# Patient Record
Sex: FEMALE | NOT HISPANIC OR LATINO | Employment: PART TIME | URBAN - METROPOLITAN AREA
[De-identification: names, ages, dates, MRNs, and addresses within clinical notes are randomized per-mention and may not be internally consistent; named-entity substitution may affect disease eponyms.]

---

## 2017-03-06 ENCOUNTER — OFFICE VISIT (OUTPATIENT)
Dept: INTERNAL MEDICINE | Facility: CLINIC | Age: 42
End: 2017-03-06

## 2017-03-06 VITALS
SYSTOLIC BLOOD PRESSURE: 103 MMHG | HEART RATE: 64 BPM | HEIGHT: 67 IN | WEIGHT: 128.6 LBS | DIASTOLIC BLOOD PRESSURE: 71 MMHG | BODY MASS INDEX: 20.18 KG/M2

## 2017-03-06 DIAGNOSIS — K62.5 RECTAL HEMORRHAGE: Primary | ICD-10-CM

## 2017-03-06 DIAGNOSIS — Z12.4 SCREENING FOR CERVICAL CANCER: ICD-10-CM

## 2017-03-06 ASSESSMENT — PAIN SCALES - GENERAL: PAINLEVEL: NO PAIN (0)

## 2017-03-06 NOTE — PATIENT INSTRUCTIONS
Primary Care Center Medication Refill Request Information:  * Please contact your pharmacy regarding ANY request for medication refills.  ** [facility] Prescription Fax = [phone]  * Please allow 3 business days for routine medication refills.  * Please allow 5 business days for controlled substance medication refills.     Primary Care Center Test Result notification information:  *You will be notified with in 7-10 days of your appointment day regarding the results of your test.  If you are on MyChart you will be notified as soon as the provider has reviewed the results and signed off on them.    Primary Care Center   [facility]  [address] (4th Floor )   [place], [place] [address] [phone]  [phone]  [facility] ([facility])  [address] [place] [address], [address]  [place] [address] [phone].

## 2017-03-06 NOTE — MR AVS SNAPSHOT
After Visit Summary   3/6/2017    Joyce Murry    MRN: [id number]           Patient Information     Date Of Birth          1975        Visit Information        Provider Department      3/6/2017 12:40 PM Cherie Rose MD [facility]        Today's Diagnoses     Rectal hemorrhage    -  1    Screening for cervical cancer          Care Instructions    Primary Care Center Medication Refill Request Information:  * Please contact your pharmacy regarding ANY request for medication refills.  ** [facility] Prescription Fax = [phone]  * Please allow 3 business days for routine medication refills.  * Please allow 5 business days for controlled substance medication refills.     Primary Care Center Test Result notification information:  *You will be notified with in 7-10 days of your appointment day regarding the results of your test.  If you are on MyChart you will be notified as soon as the provider has reviewed the results and signed off on them.    Primary Care Center   [facility]  [address] ([address]th Floor )   [place] [address] [phone]  [phone]  [facility] ([facility])  [address] [place] [address], [address]  [place] [address]  [phone].            Follow-ups after your visit        Additional Services     GI Procedure Referral       Last Lab Result: Creatinine (mg/dL)       Date                     Value                 10/08/2012               0.65             ----------  There is no height or weight on file to calculate BMI.     Needed:  No  Language:  English    Patient will be contacted to schedule procedure.     Please be aware that coverage of these services is subject to the terms and limitations of your health insurance plan.  Call member services at your health plan with any benefit or coverage questions.  Any procedures must be performed at a [facility] facility OR coordinated by your clinic's referral office.    Please bring the  following with you to your appointment:    (1) Any X-Rays, CTs or MRIs which have been performed.  Contact the facility where they were done to arrange for  prior to your scheduled appointment.    (2) List of current medications   (3) This referral request   (4) Any documents/labs given to you for this referral                  Your next 10 appointments already scheduled     Apr 19, 2017  3:00 PM CDT   New Visit with [facility] [place] ([facility] [place])    [address] [place] [address] [address]  [place] [id number] [phone]            Apr 26, 2017  1:00 PM CDT   Return Visit with Yareli Alicia [facility] [place] ([facility] [place])    [address] [place] [address] [address]  [place] [id number] [phone]              Who to contact     Please call your clinic at [phone] to:    Ask questions about your health    Make or cancel appointments    Discuss your medicines    Learn about your test results    Speak to your doctor   If you have compliments or concerns about an experience at your clinic, or if you wish to file a complaint, please contact [facility] Patient Relations at [phone] or email us at [email or web address]         Additional Information About Your Visit        [employer] Information     [facility] gives you secure access to your electronic health record. If you see a primary care provider, you can also send messages to your care team and make appointments. If you have questions, please call your primary care clinic.  If you do not have a primary care provider, please call [phone] and they will assist you.      [facility] is an electronic gateway that provides easy, online access to your medical records. With [facility], you can request a clinic appointment, read your test results, renew a prescription or communicate with your care team.     To access your existing account, please contact your [facility]  Clinic or call [phone] for assistance.        Care EveryWhere ID     This is your Care EveryWhere ID. This could be used by other organizations to access your [place] medical records  [id number]        Your Vitals Were     Pulse Last Period                64 03/02/2016 (Approximate)           Blood Pressure from Last 3 Encounters:   03/06/17 103/71   10/08/12 102/68   10/08/12 91/68    Weight from Last 3 Encounters:   10/08/12 60.8 kg (134 lb)   10/08/12 60.7 kg (133 lb 14.4 oz)              We Performed the Following     GI Procedure Referral     Pap imaged thin layer screen with HPV - recommended age 30 - 65 years (select HPV order below)        Primary Care Provider    Md Other Clinic                Thank you!     Thank you for choosing [facility]  for your care. Our goal is always to provide you with excellent care. Hearing back from our patients is one way we can continue to improve our services. Please take a few minutes to complete the written survey that you may receive in the mail after your visit with us. Thank you!             Your Updated Medication List - Protect others around you: Learn how to safely use, store and throw away your medicines at www.disposemymeds.org.          This list is accurate as of: 3/6/17  1:37 PM.  Always use your most recent med list.                   Brand Name Dispense Instructions for use    NO ACTIVE MEDICATIONS

## 2017-03-06 NOTE — PROGRESS NOTES
Chief complaint: Establish care  History of present illness: This healthy 41-year-old woman presents to [facility].   She has previously had her primary care at health [facility]. She has several questions today:    1.   She's had an intermittent right lower abdominal/inguinal bulge over the last several months. It is never painful but a pop at times. She is very physically active and does weight lifting and boxing. She's never had nausea or vomiting.  She wonders if it's aninguinali hernia.    2.  a gladys on her left breast. Been present for a few months. It's not painful or draining and she's been taking and that she just finds it to be an eyesore.    3.  Itching for weeks in theright inguinal area.   she hasn't seen a rash or redness. She wonders if it might be an irritation from her very tight workout clothes.     4.  Intermittent tingling and numbness in her right fourth and fifth fingers. Sometimes she has right elbow pain. Sometimes she wakes up in the middle the night both her hands asleep but that goes away.  Right 4-5th dave tingling and numbness.  Wakes at night with her hands asleep.      5.   Intermittent rectal bleeding.  This has been going on intermittently for a year or so.  The blood is bright red and on the toilet paper and appears to be on the outside. There've been no blood clots or mixture of stool with blood. She does not have melena. She also does not have abdominal pain, nausea or vomiting with this rectal bleeding. There is no family history of colon cancer. I did not ask there is a family history of ulcerative colitis or Crohn's disease.    Past Medical History   Diagnosis Date     Dyslipidemia, goal LDL below 160      Genital herpes        Past Surgical History   Procedure Laterality Date     D & c  2010     Family History   Problem Relation Age of Onset     Hypertension Father      pulmonary HTN     HEART DISEASE Father 55     CAD-      DIABETES Father 65     type 2     Breast  Cancer Mother 63     diganosed age 50     Lung Cancer Maternal Grandfather           Breast Cancer Paternal Aunt      Social History     Social History     Marital status:      Spouse name: N/A     Number of children: N/A     Years of education: N/A     Occupational History     Not on file.     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 No masses, lesions, tenderness or abnormalities  Neck: Neck supple. No adenopathy. Thyroid symmetric, normal size,, Carotids without bruits.  ENT: ENT exam normal, no neck nodes or sinus tenderness  Cardiovascular: negative, PMI normal. No lifts, heaves, or thrills. RRR. No murmurs, clicks gallops or rub  Respiratory: negative, Percussion normal. Good diaphragmatic excursion. Lungs clear  Gastrointestinal: Bowel sounds are normal. Soft nontender without hepatosplenomegaly. In the right inguinal region there is a bulge brought on by coughing that is reducible. It does not fully admit my finger.  : BUS vagina and cervix are normal. Pap smear taken. Bimanual examination reveals normal adnexa and no tenderness. Rectovaginal examination reveals soft internal tissue, possibly internal hemorrhoid but not polyp, and no rectal masses. The external anal  sphincter shows no anal fissures or external hemorrhoids. There is no ulceration  Musculoskeletal: extremities normal- no gross deformities noted, gait normal and normal muscle tone  Skin: There is a milia on the left breast medially.   there is no rash, ulceration or discoloration of the skin in the right groin     Neurologic: There is slight tenderness in the right medial elbow, sensation is intact in the fingers on the right hand.  Hand strength is intact.   (Pulses are strong and bilaterally symmetric and the brachial and radial pulses bilaterally) .    Psychiatric: mentation appears normal and affect normal/bright  Hematologic/Lymphatic/Immunologic: Normal cervical lymph nodes      ASSESSMENT  1.  Intermittent rectal bleeding. Physical exam is normal except for possibly showing  An internal hemorrhoidal tag.  There is no mass lesion. We talked about the pros and cons of doing colonoscopy. Recent to do it would be to look for inflammatory bowel disease in the remote possibility of colon polyps or colon cancer. Recent not to do it is across no family history, and the probability of colon cancer and 41-year-old is very low.  She will think about this and get back to me. I have given her referral for colonoscopy which would be my preference.    2.   Right ulnar neuropathy.  Intermittent sensory changes and no motor changes. Recommended simply monitoring this. If the symptoms progress, she will let me know and we will obtain an orthoped referral.    3.  Milia on left breast. Recommended hot packing and trying to express the sebum.    4.   Right inguinal hernia. This is very mild. It is possible that her 50 pound weight lifting and body building will aggravate this. Discussed. We also discussed indications for surgery which would include persistence of the hernia, increasing pain, or nausea and vomiting. At this point, she does not wish to have a surgical referral but would like to see how things go. I thought that  was fine.    5.  Healthcare maintenance: Pap smear done and sent for HPV.  Review of the outside records shows that she has had improved lipids therefore will not recheck those currently. There is no indication for additional testing at this point.    Joyce was seen today for establish care.    Diagnoses and all orders for this visit:    Rectal hemorrhage  -     GI Procedure Referral    Screening for cervical cancer  -     Cancel: Pap imaged thin layer screen with HPV - recommended age 30 - 65 years (select HPV order below)  -     Pap imaged thin layer screen with HPV - recommended age 30 - 65 years (select HPV order below)  -     HPV High Risk Types DNA Cervical

## 2017-03-08 LAB
COPATH REPORT: NORMAL
PAP: NORMAL

## 2017-03-10 LAB
FINAL DIAGNOSIS: NORMAL
HPV HR 12 DNA CVX QL NAA+PROBE: NEGATIVE
HPV16 DNA SPEC QL NAA+PROBE: NEGATIVE
HPV18 DNA SPEC QL NAA+PROBE: NEGATIVE
SPECIMEN DESCRIPTION: NORMAL

## 2017-04-13 ENCOUNTER — OFFICE VISIT (OUTPATIENT)
Dept: PSYCHOLOGY | Facility: CLINIC | Age: 42
End: 2017-04-13
Payer: COMMERCIAL

## 2017-04-13 DIAGNOSIS — F41.1 GAD (GENERALIZED ANXIETY DISORDER): Primary | ICD-10-CM

## 2017-04-13 PROCEDURE — 90791 PSYCH DIAGNOSTIC EVALUATION: CPT | Performed by: MARRIAGE & FAMILY THERAPIST

## 2017-04-13 ASSESSMENT — PATIENT HEALTH QUESTIONNAIRE - PHQ9: 5. POOR APPETITE OR OVEREATING: SEVERAL DAYS

## 2017-04-13 ASSESSMENT — ANXIETY QUESTIONNAIRES
7. FEELING AFRAID AS IF SOMETHING AWFUL MIGHT HAPPEN: SEVERAL DAYS
6. BECOMING EASILY ANNOYED OR IRRITABLE: MORE THAN HALF THE DAYS
IF YOU CHECKED OFF ANY PROBLEMS ON THIS QUESTIONNAIRE, HOW DIFFICULT HAVE THESE PROBLEMS MADE IT FOR YOU TO DO YOUR WORK, TAKE CARE OF THINGS AT HOME, OR GET ALONG WITH OTHER PEOPLE: SOMEWHAT DIFFICULT
1. FEELING NERVOUS, ANXIOUS, OR ON EDGE: MORE THAN HALF THE DAYS
3. WORRYING TOO MUCH ABOUT DIFFERENT THINGS: MORE THAN HALF THE DAYS
2. NOT BEING ABLE TO STOP OR CONTROL WORRYING: MORE THAN HALF THE DAYS
GAD7 TOTAL SCORE: 10
5. BEING SO RESTLESS THAT IT IS HARD TO SIT STILL: NOT AT ALL

## 2017-04-13 NOTE — MR AVS SNAPSHOT
MRN:[id number]                      After Visit Summary   4/13/2017    Joyce Murry    MRN: [id number]           Visit Information        Provider Department      4/13/2017 11:30 AM Denise Yareli [facility]      Your next 10 appointments already scheduled     Apr 19, 2017  3:00 PM CDT   Return Visit with Yarelijuan Alicia [facility] ([facility])    [address]  [facility] [address]   [phone]            Apr 26, 2017  1:00 PM CDT   Return Visit with Yareli Alicia [facility] ([facility] [place])    [address]  [facility] [address]   [phone]              MyChart Information     [employer] gives you secure access to your electronic health record. If you see a primary care provider, you can also send messages to your care team and make appointments. If you have questions, please call your primary care clinic.  If you do not have a primary care provider, please call [phone] and they will assist you.        Care EveryWhere ID     This is your Care EveryWhere ID. This could be used by other organizations to access your [place] medical records  [id number]

## 2017-04-13 NOTE — PROGRESS NOTES
Adult Intake Structured Interview  Standard Diagnostic Assessment      CLIENT'S NAME: Joyce Murry  MRN:   [id number]  :   1975  ACCT. NUMBER: [id number]  DATE OF SERVICE: 17      Identifying Information:  Client is a 41 year old, ,  female. Client was referred for counseling by self. Client is currently unemployed. Client attended the session alone. Client will send her  for the next visit and then we will do family therapy.      Client's Statement of Presenting Concern:  Client reports the reason for seeking therapy at this time as needing help with anxiety related to current life circumstances including marriage, career/work, parenting and life transition. Client wants to be able to partner more with her  and be able to be vulnerable with each other which currently doesn't happen. Also trying to figure out her role now as mother, wife, individual.   Client stated that her symptoms have resulted in the following functional impairments: relationship(s)      History of Presenting Concern:  Client reports that these problem(s) began in the past year. Client has attempted to resolve these concerns in the past through individual counseling. Client reports that other professional(s) are involved in providing support / services. Client has an individual therapist and she will see me for family work,      Social History:  Client reported she grew up in [place].. They were the third born of 3 children. This is an intact family and parents remain . However her father  in 2016. Client reported that her childhood was happy. She grew up in an upper middle class family that was culturally diverse. Her father is from [place]. Client described her current relationships with family of origin as good  with STEVEN but strained with her . They bicker a lot and she has anxiety about his anger issues but also mad at him for having them.    Client reported a history of 1 committed relationships or marriages. Client has been  for 14 years. Client reported having 2 children. Client identified few stable and meaningful social connections. Client reported that she has not been involved with the legal system.  Client's highest education level was college graduate. Client did not identify any learning problems. There are no ethnic, cultural or Religion factors that may be relevant for therapy. Client identified her preferred language to be English. Client reported she does not need the assistance of an  or other support involved in therapy. Modifications will not be used to assist communication in therapy. Client did not serve in the .     Client reports family history includes Breast Cancer in her paternal aunt; Breast Cancer (age of onset: 63) in her mother; DIABETES (age of onset: 65) in her father; HEART DISEASE (age of onset: 55) in her father; Hypertension in her father; Lung Cancer in her maternal grandfather.    Mental Health History:  Client reported no family history of mental health issues.  Client previously received the following mental health diagnosis: Anxiety.  Client has not received mental health services in the past.  Hospitalizations: None.  Client is currently receiving the following services: counseling.  Client does not take medication for anxiety.    Chemical Health History:  Client reported no family history of chemical health issues. Client has not received chemical dependency treatment in the past. Client is not currently receiving any chemical dependency treatment. Client reports no problems as a result of their drinking / drug use.      Client Reports:  Client denies using alcohol.  Client denies using tobacco.  Client denies using marijuana.  Client reports using  caffeine 2 times per day and drinks 1 at a time. Client started using caffeine at age 8.  Client denies using street drugs.  Client denies the non-medical use of prescription or over the counter drugs.    CAGE: None of the patient's responses to the CAGE screening were positive / Negative CAGE score   Based on the negative Cage-Aid score and clinical interview there  are not indications of drug or alcohol abuse.    Discussed the general effects of drugs and alcohol on health and well-being. Therapist gave client printed information about the effects of chemical use on her health and well being.      Significant Losses / Trauma / Abuse / Neglect Issues:  There are indications or report of significant loss, trauma, abuse or neglect issues related to: death of her father in 9/2016.    Issues of possible neglect are not present.      Medical Issues:  Client has had a physical exam to rule out medical causes for current symptoms. Date of last physical exam was within the past year. Client was encouraged to follow up with PCP if symptoms were to develop. The client has a [place] Primary Care Provider at [facility] named Cherie Rose. The client reports not having a psychiatrist. Client reports no current medical concerns. The client denies the presence of chronic or episodic pain. There are not significant nutritional concerns.     Client reports not having any current medications.    Client Allergies:  Allergies   Allergen Reactions     Augmentin Rash     the following allergies to medications: augmentin    Medical History:  Past Medical History:   Diagnosis Date     Genital herpes      History of hyperlipidemia, improved as of 2016 with diet and exercise          Medication Adherence:  N/A - Client does not have prescribed psychiatric medications.    Client was provided recommendation to follow-up with prescribing physician.    Mental Status Assessment:  Appearance:   Appropriate   Eye Contact:   Good   Psychomotor  Behavior: Normal   Attitude:   Cooperative   Orientation:   All  Speech   Rate / Production: Normal    Volume:  Normal   Mood:    Normal  Affect:    Appropriate   Thought Content:  Clear   Thought Form:  Coherent  Logical   Insight:    Good       Review of Symptoms:  Depression: Sleep Appetite Hopeless Worthless Ruminations Irritability  Ama:  No symptoms  Psychosis: No symptoms  Anxiety: Worries Nervousness Triggers: safety of her children   Panic:  No symptoms  Post Traumatic Stress Disorder: No symptoms  Obsessive Compulsive Disorder: No symptoms  Eating Disorder: No symptoms  Oppositional Defiant Disorder: No symptoms  ADD / ADHD: No symptoms  Conduct Disorder: No symptoms        Safety Issues and Plan for Safety and Risk Management:  Client denies a history of suicidal ideation, suicide attempts, self-injurious behavior, homicidal ideation, homicidal behavior and and other safety concerns  Client denies current fears or concerns for personal safety.  Client denies current or recent suicidal ideation or behaviors.  Client denies current or recent homicidal ideation or behaviors.  Client denies current or recent self injurious behavior or ideation.  Client denies other safety concerns.  Client reports there are no firearms in the house.  A safety and risk management plan has not been developed at this time, however client was given the after-hours number / 911 should there be a change in any of these risk factors.    Client's Strengths and Limitations:  Client identified the following strengths or resources that will help her succeed in counseling: commitment to health and well being, friends / good social support, family support and intelligence. Client identified the following supports: family and friends. Things that may interfere with the clients success in counseling include:getting stuck in old patterns..        Diagnostic Criteria:  A. Excessive anxiety and worry about a number of events or activities  (such as work or school performance).   B. The person finds it difficult to control the worry.   - Restlessness or feeling keyed up or on edge.    - Being easily fatigued.    - Difficulty concentrating or mind going blank.    - Irritability.    - Sleep disturbance (difficulty falling or staying asleep, or restless unsatisfying sleep).   E. The anxiety, worry, or physical symptoms cause clinically significant distress or impairment in social, occupational, or other important areas of functioning.   F. The disturbance is not due to the direct physiological effects of a substance (e.g., a drug of abuse, a medication) or a general medical condition (e.g., hyperthyroidism) and does not occur exclusively during a Mood Disorder, a Psychotic Disorder, or a Pervasive Developmental Disorder.    - The aformentioned symptoms began 6 month(s) ago and occurs 5 days per week and is experienced as moderate.      Functional Status:  Client's symptoms are causing reduced functional status in the following areas: Social / Relational - marital discord      DSM5 Diagnoses: (Sustained by DSM5 Criteria Listed Above)  Diagnoses: 300.02 (F41.1) Generalized Anxiety Disorder  Psychosocial & Contextual Factors: Marital discord, unemployed at home mother  WHODAS 2.0 (12 item)            This questionnaire asks about difficulties due to health conditions. Health conditions  include  disease or illnesses, other health problems that may be short or long lasting,  injuries, mental health or emotional problems, and problems with alcohol or drugs.                     Think back over the past 30 days and answer these questions, thinking about how much  difficulty you had doing the following activities. For each question, please [place] only  one response.    S1 Standing for long periods such as 30 minutes? None =         1   S2 Taking care of household responsibilities? None =         1   S3 Learning a new task, for example, learning how to get to a  new place? None =         1   S4 How much of a problem do you have joining community activities (for example, festivals, [facility] or other activities) in the same way as anyone else can? None =         1   S5 How much have you been emotionally affected by your health problems? None =         1     In the past 30 days, how much difficulty did you have in:   S6 Concentrating on doing something for ten minutes? Mild =           2   S7 Walking a long distance such as a kilometer (or equivalent)? None =         1   S8 Washing your whole body? None =         1   S9 Getting dressed? None =         1   S10 Dealing with people you do not know? None =         1   S11 Maintaining a friendship? None =         1   S12 Your day to day work? Mild =           2     H1 Overall, in the past 30 days, how many days were these difficulties present? Record number of days 3   H2 In the past 30 days, for how many days were you totally unable to carry out your usual activities or work because of any health condition? Record number of days  0   H3 In the past 30 days, not counting the days that you were totally unable, for how many days did you cut back or reduce your usual activities or work because of any health condition? Record number of days 0     Attendance Agreement:  Client has signed Attendance Agreement:Yes      Preliminary Treatment Plan:  The client reports no currently identified [facility], ethnic or cultural issues relevant to therapy.     services are not indicated.    Modifications to assist communication are not indicated.    The concerns identified by the client will be addressed in therapy.    Initial Treatment will focus on: Adjustment Difficulties related to: family concerns.    As a preliminary treatment goal, client will experience a reduction in anxiety, will develop more effective coping skills to manage anxiety symptoms and will increase ability to function adaptively.    The focus of initial  interventions will be to teach communication skills. Encouraged client's  to come individually and then we will start counseling. Client wants more of a partnership with spouse. They bicker. He has anger issues that she doesn't handle well. She is frustrated by him and they have lost connection. No date nights, no babysitting resources, lack of intimacy or vulnerability.    Collaboration with other professionals is not indicated at this time.    Referral to another professional/service is not indicated at this time..    A Release of Information is not needed at this time.    Report to child / adult protection services was NA.    Client will have access to their [facility]' medical record.    Yareli Walker  April 13, 2017

## 2017-04-14 ASSESSMENT — ANXIETY QUESTIONNAIRES: GAD7 TOTAL SCORE: 10

## 2017-04-14 ASSESSMENT — PATIENT HEALTH QUESTIONNAIRE - PHQ9: SUM OF ALL RESPONSES TO PHQ QUESTIONS 1-9: 4

## 2017-05-02 ENCOUNTER — OFFICE VISIT (OUTPATIENT)
Dept: PSYCHOLOGY | Facility: CLINIC | Age: 42
End: 2017-05-02
Payer: COMMERCIAL

## 2017-05-02 DIAGNOSIS — F41.1 GAD (GENERALIZED ANXIETY DISORDER): Primary | ICD-10-CM

## 2017-05-02 PROCEDURE — 90834 PSYTX W PT 45 MINUTES: CPT | Performed by: MARRIAGE & FAMILY THERAPIST

## 2017-05-02 NOTE — MR AVS SNAPSHOT
MRN:[id number]                      After Visit Summary   5/2/2017    Joyce Murry    MRN: [id number]           Visit Information        Provider Department      5/2/2017 9:30 AM Denise [facility]      Your next 10 appointments already scheduled     May 18, 2017  9:30 AM CDT   Return Visit with Yarelijuan [facility] ([facility] [place])    [address]  [place] [address]   [phone]            Jun 01, 2017  9:30 AM CDT   Return Visit with Yareli Alic[facility] ([facility] [place])    [address]  [place] [address]   [phone]              MyChart Information     [facility] gives you secure access to your electronic health record. If you see a primary care provider, you can also send messages to your care team and make appointments. If you have questions, please call your primary care clinic.  If you do not have a primary care provider, please call [phone] and they will assist you.        Care EveryWhere ID     This is your Care EveryWhere ID. This could be used by other organizations to access your [facility] medical records  [id number]

## 2017-05-02 NOTE — PROGRESS NOTES
Progress Note    Client Name: Joyce Murry  Date: 5/2/2017         Service Type: Individual      Session Start Time: 9:30  Session End Time: 10:15      Session Length: 45     Session #: 2     Attendees: Spouse / Significant Other    Treatment Plan Last Reviewed: Next session  PHQ-9 / GABRIEL-7 : Each session     DATA      Progress Since Last Session (Related to Symptoms / Goals / Homework):   Symptoms: Stable    Homework: Partially completed      Episode of Care Goals: Satisfactory progress - ACTION (Actively working towards change); Intervened by reinforcing change plan / affirming steps taken     Current / Ongoing Stressors and Concerns:   Client's  comes to provide collateral information to inform family therapy for next session. He agrees there is a need for counseling. Says they bicker a lot. His parents also bicker. He acknowledges he has anger issues and he is in therapy working on this and has been for years. Says he is not angry at work and would never be so but in his personal life, he struggles. Says Joyce gets frustrated with him and does not validate him which makes it worse. Thinks she lacks the skill to empathize and validate. Also her father was controlling and Patriarchal (from [place]) and she doesn't like him to tell her how she should manage him. He plays more the role of woman/wife and is very emotionally intelligent. Hard for Joyce to be vulnerable. Couple does little to nurture their marriage which this writer warned him they need to prioritize marriage over parenting. He agrees but doesn't think Joyce would agree. Both struggle to ask for what they need as well. Prone to prioritizing kids needs over their own and struggle with guilt when they don't.      Treatment Objective(s) Addressed in This Session:   Identify goals for couples work.  Improve communication by Joyce validating Jer more often.  Possibly teach S/L technique.  Couple to find  ways to prioritize their marriage with date nights, intentional daily communication, weekend/vacations together, etc.  Find ways to partner more so they bring out the best in one another  Improve intimacy     Intervention:   Relationship counseling        ASSESSMENT: Current Emotional / Mental Status (status of significant symptoms):   Risk status (Self / Other harm or suicidal ideation)   Client denies current fears or concerns for personal safety.   Client denies current or recent suicidal ideation or behaviors.   Client denies current or recent homicidal ideation or behaviors.   Client denies current or recent self injurious behavior or ideation.   Client denies other safety concerns.   A safety and risk management plan has not been developed at this time, however client was given the after-hours number / 911 should there be a change in any of these risk factors.     Appearance:   Appropriate    Eye Contact:   Good    Psychomotor Behavior: Normal    Attitude:   Cooperative    Orientation:   All   Speech    Rate / Production: Normal     Volume:  Normal    Mood:    Normal   Affect:    Appropriate    Thought Content:  Clear    Thought Form:  Coherent  Logical    Insight:    Good      Medication Review:   No current psychiatric medications prescribed     Medication Compliance:   NA     Changes in Health Issues:   None reported     Chemical Use Review:   Substance Use: Chemical use reviewed, no active concerns identified      Tobacco Use: No current tobacco use.       Collateral Reports Completed:   Not Applicable    PLAN: (Client Tasks / Therapist Tasks / Other)  Return together for conjoint session. Restate goals ID by both including communication, partnering and improving intimacy.         Yareli Walker                                                         ________________________________________________________________________

## 2017-05-03 ASSESSMENT — PATIENT HEALTH QUESTIONNAIRE - PHQ9: SUM OF ALL RESPONSES TO PHQ QUESTIONS 1-9: 3

## 2017-05-18 ENCOUNTER — OFFICE VISIT (OUTPATIENT)
Dept: PSYCHOLOGY | Facility: CLINIC | Age: 42
End: 2017-05-18
Payer: COMMERCIAL

## 2017-05-18 DIAGNOSIS — F41.1 GAD (GENERALIZED ANXIETY DISORDER): Primary | ICD-10-CM

## 2017-05-18 PROCEDURE — 90846 FAMILY PSYTX W/O PT 50 MIN: CPT | Performed by: MARRIAGE & FAMILY THERAPIST

## 2017-05-18 ASSESSMENT — ANXIETY QUESTIONNAIRES
IF YOU CHECKED OFF ANY PROBLEMS ON THIS QUESTIONNAIRE, HOW DIFFICULT HAVE THESE PROBLEMS MADE IT FOR YOU TO DO YOUR WORK, TAKE CARE OF THINGS AT HOME, OR GET ALONG WITH OTHER PEOPLE: SOMEWHAT DIFFICULT
5. BEING SO RESTLESS THAT IT IS HARD TO SIT STILL: NOT AT ALL
7. FEELING AFRAID AS IF SOMETHING AWFUL MIGHT HAPPEN: MORE THAN HALF THE DAYS
GAD7 TOTAL SCORE: 9
2. NOT BEING ABLE TO STOP OR CONTROL WORRYING: SEVERAL DAYS
3. WORRYING TOO MUCH ABOUT DIFFERENT THINGS: MORE THAN HALF THE DAYS
6. BECOMING EASILY ANNOYED OR IRRITABLE: SEVERAL DAYS
1. FEELING NERVOUS, ANXIOUS, OR ON EDGE: MORE THAN HALF THE DAYS

## 2017-05-18 ASSESSMENT — PATIENT HEALTH QUESTIONNAIRE - PHQ9: 5. POOR APPETITE OR OVEREATING: SEVERAL DAYS

## 2017-05-18 NOTE — PROGRESS NOTES
Progress Note    Client Name: Joyce Murry  Date: 5/18/2017         Service Type: Individual      Session Start Time: 9:30  Session End Time: 10:15      Session Length: 45     Session #: 3     Attendees: Spouse / Significant Other    Treatment Plan Last Reviewed: 5/18/2017  PHQ-9 / GABRIEL-7 : Each session     DATA      Progress Since Last Session (Related to Symptoms / Goals / Homework):   Symptoms: Stable    Homework: Partially completed      Episode of Care Goals: Satisfactory progress - ACTION (Actively working towards change); Intervened by reinforcing change plan / affirming steps taken     Current / Ongoing Stressors and Concerns:   Client and  come for first session. Talked about partnering and what this would look like for each of them. Joyce needs to feel more financially independent to feel a better partnership, needs for  Jer to be less angry so she can feel less stress about this, also for him to support her in parenting decisions even when he thinks she is being reactive and also wants to feel greater intimacy and  connection with him as well as less irritability. Therapist suggested much of her frustration with him could be related to her individual issues with being at home and dependent as well as trying to navigate  his anger. Jer expressed a desire for an equal partnership and used the word equal multiple times. Wants to feel like they can communicate more effectively being able to express opinions and be  understood and try to be on the same page. Jer kept comparing his anger to her anxiety and wanted Joyce to validate this. She remained steadfast in her judgment about it and was unwilling to see it  from his perspective or he from hers. Both stubborn and defended in their positions but at a stalemate. Joyce wants less overexplaining, clearer roles and less communication breakdowns. Jer wants  more validation.      Treatment Objective(s)  Addressed in This Session:   Identify goals for couples work.  Improve communication by Joyce validating Jer more often.  Possibly teach S/L technique next time  Couple to find ways to prioritize their marriage with date nights, intentional daily communication, weekend/vacations together, etc.  Find ways to partner more so they bring out the best in one another  Improve intimacy     Intervention:   Relationship counseling   Strategies to lower anxiety.          ASSESSMENT: Current Emotional / Mental Status (status of significant symptoms):   Risk status (Self / Other harm or suicidal ideation)   Client denies current fears or concerns for personal safety.   Client denies current or recent suicidal ideation or behaviors.   Client denies current or recent homicidal ideation or behaviors.   Client denies current or recent self injurious behavior or ideation.   Client denies other safety concerns.   A safety and risk management plan has not been developed at this time, however client was given the after-hours number / 911 should there be a change in any of these risk factors.     Appearance:   Appropriate    Eye Contact:   Good    Psychomotor Behavior: Normal    Attitude:   Cooperative    Orientation:   All   Speech    Rate / Production: Normal     Volume:  Normal    Mood:    Normal   Affect:    Appropriate    Thought Content:  Clear    Thought Form:  Coherent  Logical    Insight:    Good      Medication Review:   No current psychiatric medications prescribed     Medication Compliance:   NA     Changes in Health Issues:   None reported     Chemical Use Review:   Substance Use: Chemical use reviewed, no active concerns identified      Tobacco Use: No current tobacco use.       Collateral Reports Completed:   Not Applicable    PLAN: (Client Tasks / Therapist Tasks / Other)  Return together for conjoint session. Restate goals ID by both including communication, partnering and improving intimacy.         Yareli Alicia  Denise                                                         ________________________________________________________________________                                                 Treatment Plan    Client's Name: Joyce Murry  YOB: 1975    Date: 5/18/2017    DSM-V Diagnoses: 300.02 - Generalized Anxiety Disorder By History; V71.09 - No Diagnosis  Psychosocial / Contextual Factors: marital discord  WHODAS: 14    Referral / Collaboration:  Referral to another professional/service is not indicated at this time..    Anticipated number of session or this episode of care: 10      MeasurableTreatment Goal(s) related to diagnosis / functional impairment(s)  Goal 1: Client will lower GAD7 score to 3 or below.    I will know I've met my goal when I'm less anxious in general.      Objective #A (Client Action)    Client will participate in couples counseling to address communication breakdowns.  Status: New - Date: 5/18/2017     Intervention(s)  Therapist will work with couple on communication..    Objective #B  Client will look to pursue work outside the home..  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    Objective #C  Client will use CBT to address anxious thinking..  Status: New - Date: 5/18/2017     Intervention(s)  Therapist will teach CBT.      Client has reviewed and agreed to the above plan.      Yareli Walker  May 18, 2017

## 2017-05-18 NOTE — MR AVS SNAPSHOT
MRN:[id number]                      After Visit Summary   5/18/2017    Joyce Murry    MRN: [id number]           Visit Information        Provider Department      5/18/2017 9:30 AM [facility]      Your next 10 appointments already scheduled     Jun 01, 2017  9:30 AM CDT   [facility] [place] ([facility] [place])    [address]  [place] [address]   [phone]            Jun 22, 2017  9:00 AM CDT   Return Visit with Yareli Alicia [facility] [place] ([facility] [place])    [address]  [place] [address]   [phone]            Jul 20, 2017  9:00 AM CDT   Return Visit with Yareli Alicia [facility] [place] ([facility] [place])    [address]  [place] [address]   [phone]              MyChart Information     [employer] gives you secure access to your electronic health record. If you see a primary care provider, you can also send messages to your care team and make appointments. If you have questions, please call your primary care clinic.  If you do not have a primary care provider, please call [phone] and they will assist you.        Care EveryWhere ID     This is your Care EveryWhere ID. This could be used by other organizations to access your [place] medical records  [id number]

## 2017-05-19 ASSESSMENT — PATIENT HEALTH QUESTIONNAIRE - PHQ9: SUM OF ALL RESPONSES TO PHQ QUESTIONS 1-9: 4

## 2017-05-19 ASSESSMENT — ANXIETY QUESTIONNAIRES: GAD7 TOTAL SCORE: 9

## 2017-06-01 ENCOUNTER — OFFICE VISIT (OUTPATIENT)
Dept: PSYCHOLOGY | Facility: CLINIC | Age: 42
End: 2017-06-01
Payer: COMMERCIAL

## 2017-06-01 DIAGNOSIS — F41.1 GAD (GENERALIZED ANXIETY DISORDER): Primary | ICD-10-CM

## 2017-06-01 PROCEDURE — 90847 FAMILY PSYTX W/PT 50 MIN: CPT | Performed by: MARRIAGE & FAMILY THERAPIST

## 2017-06-01 ASSESSMENT — ANXIETY QUESTIONNAIRES
GAD7 TOTAL SCORE: 11
2. NOT BEING ABLE TO STOP OR CONTROL WORRYING: MORE THAN HALF THE DAYS
6. BECOMING EASILY ANNOYED OR IRRITABLE: MORE THAN HALF THE DAYS
5. BEING SO RESTLESS THAT IT IS HARD TO SIT STILL: NOT AT ALL
7. FEELING AFRAID AS IF SOMETHING AWFUL MIGHT HAPPEN: MORE THAN HALF THE DAYS
IF YOU CHECKED OFF ANY PROBLEMS ON THIS QUESTIONNAIRE, HOW DIFFICULT HAVE THESE PROBLEMS MADE IT FOR YOU TO DO YOUR WORK, TAKE CARE OF THINGS AT HOME, OR GET ALONG WITH OTHER PEOPLE: SOMEWHAT DIFFICULT
3. WORRYING TOO MUCH ABOUT DIFFERENT THINGS: MORE THAN HALF THE DAYS
1. FEELING NERVOUS, ANXIOUS, OR ON EDGE: MORE THAN HALF THE DAYS

## 2017-06-01 ASSESSMENT — PATIENT HEALTH QUESTIONNAIRE - PHQ9: 5. POOR APPETITE OR OVEREATING: SEVERAL DAYS

## 2017-06-01 NOTE — MR AVS SNAPSHOT
MRN:[id number]                      After Visit Summary   6/1/2017    Joyce Murry    MRN: [id number]           Visit Information        Provider Department      6/1/2017 9:30 [facility]      Your next 10 appointments already scheduled     Jun 22, 2017  9:00 AM CDT   [facility] [place] [facility] [place])    [address]  [place] [address]   [phone]            Jul 20, 2017  9:00 AM CDT   Return Visit with Yareli Alicia [facility] [place] ([facility] [place])    [address]  [place] [address]   [phone]            Aug 03, 2017  9:00 AM CDT   Return Visit with Yareli Alicia [facility] [place] ([facility] [place])    [address]  [place] [address]   [phone]              MyChart Information     [employer] gives you secure access to your electronic health record. If you see a primary care provider, you can also send messages to your care team and make appointments. If you have questions, please call your primary care clinic.  If you do not have a primary care provider, please call [phone] and they will assist you.        Care EveryWhere ID     This is your Care EveryWhere ID. This could be used by other organizations to access your [place] medical records  [id number]

## 2017-06-01 NOTE — PROGRESS NOTES
Progress Note    Client Name: Joyce Murry  Date: 6/1/2017         Service Type: Individual      Session Start Time: 9:30  Session End Time: 10:15      Session Length: 45     Session #: 4     Attendees: Spouse / Significant Other    Treatment Plan Last Reviewed: 5/18/2017  PHQ-9 / GABRIEL-7 : Each session     DATA      Progress Since Last Session (Related to Symptoms / Goals / Homework):   Symptoms: Stable    Homework: Partially completed      Episode of Care Goals: Satisfactory progress - ACTION (Actively working towards change); Intervened by reinforcing change plan / affirming steps taken     Current / Ongoing Stressors and Concerns:   Tough session trying to navigate Jer's communication issues. He is highly sensitive and quick to perceive criticism and become defensive. He tends to overanalyze and make certain assumptions. Thought this writer was expecting him to 'mind read' which made him frustrated. Very frustrating process to regroup. Had couple do the S/L technique. Both tend to be verbose and manage each other's feelings before stating their request. Encouraged them to be more concise, less pre-emptive and presumptuous and willing to continue to actively listen and clarify. Both requested similar things: Joyce wants Jer to notice and pay attention to his energy/mood/intensity especially around the kids (when he is emotional) and Jer would like Joyce to not dawdle when she is going somewhere and just go. He feels like he can't go about what he plans to do when she is hesitating to leave and her energy is a distraction to him. Not for the sake of the kids but for himself. Also Joyce notices that she doesn't like it when Jer tries to 'help' her when she hasn't asked for it. Jer knows he does this and says he gets significance from doing it. But he understands it can be annoying. Jer also made a big deal trying to create balance in the session as he did last time.  Wanted Joyce to acknowledge that they both have work to do on their communication. Last time, he wanted Joyce to acknowledge her anxiety issues are not unlike his anger issues. Seems to need to not be the only one 'working' on things. Wants equality. Couple think the S/L will be a good tool. Encouraged them to get grounded in it and allow it to help stabilize their communication.         Treatment Objective(s) Addressed in This Session:   Identify goals for couples work.  Improve communication by Joyce validating Jer more often.  S/L technique regularly to ground their communication  Couple to find ways to prioritize their marriage with date nights, intentional daily communication, weekend/vacations together, etc.  Find ways to partner more so they bring out the best in one another  Improve intimacy     Intervention:   Relationship counseling   Strategies to lower anxiety.          ASSESSMENT: Current Emotional / Mental Status (status of significant symptoms):   Risk status (Self / Other harm or suicidal ideation)   Client denies current fears or concerns for personal safety.   Client denies current or recent suicidal ideation or behaviors.   Client denies current or recent homicidal ideation or behaviors.   Client denies current or recent self injurious behavior or ideation.   Client denies other safety concerns.   A safety and risk management plan has not been developed at this time, however client was given the after-hours number / 911 should there be a change in any of these risk factors.     Appearance:   Appropriate    Eye Contact:   Good    Psychomotor Behavior: Normal    Attitude:   Cooperative    Orientation:   All   Speech    Rate / Production: Normal     Volume:  Normal    Mood:    Normal   Affect:    Appropriate    Thought Content:  Clear    Thought Form:  Coherent  Logical    Insight:    Good      Medication Review:   No current psychiatric medications prescribed     Medication  Compliance:   NA     Changes in Health Issues:   None reported     Chemical Use Review:   Substance Use: Chemical use reviewed, no active concerns identified      Tobacco Use: No current tobacco use.       Collateral Reports Completed:   Not Applicable    PLAN: (Client Tasks / Therapist Tasks / Other)  Use S/L technique, less managing of one another's feelings in communicating, more concise talking, talk before session about what they want to work on. Meet once more before their trip.        Yareli Alicia \[facility] [place] [facility]\""                                                         ________________________________________________________________________                                                 Treatment Plan    Client's Name: Joyce Murry  YOB: 1975    Date: 5/18/2017    DSM-V Diagnoses: 300.02 - Generalized Anxiety Disorder By History; V71.09 - No Diagnosis  Psychosocial / Contextual Factors: marital discord  WHODAS: 14    Referral / Collaboration:  Referral to another professional/service is not indicated at this time..    Anticipated number of session or this episode of care: 10      MeasurableTreatment Goal(s) related to diagnosis / functional impairment(s)  Goal 1: Client will lower GAD7 score to 3 or below.    I will know I've met my goal when I'm less anxious in general.      Objective #A (Client Action)    Client will participate in couples counseling to address communication breakdowns.  Status: New - Date: 5/18/2017     Intervention(s)  Therapist will work with couple on communication..    Objective #B  Client will look to pursue work outside the home..  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    Objective #C  Client will use CBT to address anxious thinking..  Status: New - Date: 5/18/2017     Intervention(s)  Therapist will teach CBT.      Client has reviewed and agreed to the above plan.      Yareli Alicia  [place]  May 18, 2017

## 2017-06-02 ASSESSMENT — ANXIETY QUESTIONNAIRES: GAD7 TOTAL SCORE: 11

## 2017-06-02 ASSESSMENT — PATIENT HEALTH QUESTIONNAIRE - PHQ9: SUM OF ALL RESPONSES TO PHQ QUESTIONS 1-9: 6

## 2017-06-22 ENCOUNTER — OFFICE VISIT (OUTPATIENT)
Dept: PSYCHOLOGY | Facility: CLINIC | Age: 42
End: 2017-06-22
Payer: COMMERCIAL

## 2017-06-22 DIAGNOSIS — F41.1 GAD (GENERALIZED ANXIETY DISORDER): Primary | ICD-10-CM

## 2017-06-22 PROCEDURE — 90846 FAMILY PSYTX W/O PT 50 MIN: CPT | Performed by: MARRIAGE & FAMILY THERAPIST

## 2017-06-22 NOTE — MR AVS SNAPSHOT
MRN:[id number]                      After Visit Summary   6/22/2017    Joyce Murry    MRN: [id number]           Visit Information        Provider Department      6/22/2017 9:00 AM Denise Yareli Alicia [facility] Generic      Your next 10 appointments already scheduled     Jul 20, 2017  9:00 AM CDT   Return Visit with Yareli Alicia [facility] [place] ([facility] [place])    [address]  [place] [address]   [phone]            Aug 03, 2017  9:00 AM CDT   Return Visit with Yareli Alicia [facility] [place] ([facility] [place])    [address]  [place] [address] [phone]              MyChart Information     [employer] gives you secure access to your electronic health record. If you see a primary care provider, you can also send messages to your care team and make appointments. If you have questions, please call your primary care clinic.  If you do not have a primary care provider, please call [phone] and they will assist you.        Care EveryWhere ID     This is your Care EveryWhere ID. This could be used by other organizations to access your [place] medical records  [id number]        Equal Access to Services     ANNIE KRISHNAMURTHY AH: Renuka junioro Sowendy, waaxda luqadaha, qaybta kaalmada adetea, abhishek khan. So [facility] [phone].    ATENCIÓN: Si habla español, tiene a uribe disposición servicios gratuitos de asistencia lingüística. Llame al [phone].    We comply with applicable federal civil rights laws and [place] laws. We do not discriminate on the basis of race, color, national origin, age, disability sex, sexual orientation or gender identity.

## 2017-06-22 NOTE — PROGRESS NOTES
Progress Note    Client Name: Joyce Murry  Date: 6/22/2017         Service Type: Individual      Session Start Time: 9:30  Session End Time: 10:15      Session Length: 45     Session #: 5     Attendees: Spouse / Significant Other    Treatment Plan Last Reviewed: 5/18/2017  PHQ-9 / GABRIEL-7 : Each session     DATA      Progress Since Last Session (Related to Symptoms / Goals / Homework):   Symptoms: Stable    Homework: Partially completed      Episode of Care Goals: Satisfactory progress - ACTION (Actively working towards change); Intervened by reinforcing change plan / affirming steps taken     Current / Ongoing Stressors and Concerns:   Jer came alone due to babysitting issues. Talked about how communication has been up and down. Joyce accused Jer of trying to level the playing field which he does when he tries to connect with Joyce by sharing his experience or thoughts about something without validating her. Being willing to let a discussion be about him and the issues she may be having with him. Process that fully before bringing up an example involving her. Ex of anger and anxiety. Jer has a very strong need to understand things fully and things have to make sense. He is black and white about things. The couple try to engage when dysregulated which doesn't help them. Encouraged them both to give each other what they need in those moments which could be space, understanding, a request for help, etc. Then discuss later using S/L technique what the issues or feelings were about once no longer angry or upset. Jer is triggered by lateness even though he tends to self sabotage with this. Told Jer we should discuss intimacy at some point. He thinks if communication is better, their intimacy will improve. Joyce reports less anxiety with parenting. She also has a job she is working (design client).      Treatment Objective(s) Addressed in This Session:   Identify goals for  couples work.  Improve communication by Joyce validating Jer more often.  Couple to find ways to prioritize their marriage with date nights, intentional daily communication, weekend/vacations together, etc.  Find ways to partner more so they bring out the best in one another  Improve intimacy     Intervention:   Relationship counseling   Strategies to lower anxiety.   S/L technique regularly to ground their communication   Resist communicating when dysregulated.     ASSESSMENT: Current Emotional / Mental Status (status of significant symptoms):   Risk status (Self / Other harm or suicidal ideation)   Client denies current fears or concerns for personal safety.   Client denies current or recent suicidal ideation or behaviors.   Client denies current or recent homicidal ideation or behaviors.   Client denies current or recent self injurious behavior or ideation.   Client denies other safety concerns.   A safety and risk management plan has not been developed at this time, however client was given the after-hours number / 911 should there be a change in any of these risk factors.     Appearance:   Appropriate    Eye Contact:   Good    Psychomotor Behavior: Normal    Attitude:   Cooperative    Orientation:   All   Speech    Rate / Production: Normal     Volume:  Normal    Mood:    Normal   Affect:    Appropriate    Thought Content:  Clear    Thought Form:  Coherent  Logical    Insight:    Good      Medication Review:   No current psychiatric medications prescribed     Medication Compliance:   NA     Changes in Health Issues:   None reported     Chemical Use Review:   Substance Use: Chemical use reviewed, no active concerns identified      Tobacco Use: No current tobacco use.       Collateral Reports Completed:   Not Applicable    PLAN: (Client Tasks / Therapist Tasks / Other)  Use S/L technique, less managing of one another's feelings in communicating, more concise talking, talk before session about what they want to  work on.   Ask about their trip.      Yareli Ravin Walker                                                         ________________________________________________________________________                                                 Treatment Plan    Client's Name: Joyce Murry  YOB: 1975    Date: 5/18/2017    DSM-V Diagnoses: 300.02 - Generalized Anxiety Disorder By History; V71.09 - No Diagnosis  Psychosocial / Contextual Factors: marital discord  WHODAS: 14    Referral / Collaboration:  Referral to another professional/service is not indicated at this time..    Anticipated number of session or this episode of care: 10      MeasurableTreatment Goal(s) related to diagnosis / functional impairment(s)  Goal 1: Client will lower GAD7 score to 3 or below.    I will know I've met my goal when I'm less anxious in general.      Objective #A (Client Action)    Client will participate in couples counseling to address communication breakdowns.  Status: New - Date: 5/18/2017     Intervention(s)  Therapist will work with couple on communication..    Objective #B  Client will look to pursue work outside the home..  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    Objective #C  Client will use CBT to address anxious thinking..  Status: New - Date: 5/18/2017     Intervention(s)  Therapist will teach CBT.      Client has reviewed and agreed to the above plan.      Yareli Walker  May 18, 2017

## 2017-06-26 ENCOUNTER — TELEPHONE (OUTPATIENT)
Dept: INTERNAL MEDICINE | Facility: CLINIC | Age: 42
End: 2017-06-26

## 2017-06-26 ENCOUNTER — MYC MEDICAL ADVICE (OUTPATIENT)
Dept: INTERNAL MEDICINE | Facility: CLINIC | Age: 42
End: 2017-06-26

## 2017-06-26 DIAGNOSIS — F41.0 ANXIETY ATTACK: Primary | ICD-10-CM

## 2017-06-26 RX ORDER — ALPRAZOLAM 0.25 MG
TABLET ORAL
Qty: 10 TABLET | Refills: 0 | Status: SHIPPED | OUTPATIENT
Start: 2017-06-26 | End: 2018-11-19

## 2017-06-26 NOTE — TELEPHONE ENCOUNTER
----- Message from Raine Leydi sent at 6/26/2017 12:27 PM CDT -----  Regarding: PT CHECKING ON STATUS OF PREVIOUS CALL - ADRIANA  Contact: [phone]  Pt called again regarding status of earlier request for 2 or 3 pills of Xanex for an upcoming flight she'll be on in the next 2 days.     Please give Pt a call back with status at [phone].    Thank you,    [facility]  Call Center    Please DO NOT send message and or reply back to sender. Call center Representatives DO NOT respond to Messages.

## 2017-06-26 NOTE — TELEPHONE ENCOUNTER
----- Message from Manuelito Osborne sent at 6/26/2017  8:39 AM CDT -----  Regarding: Dr. MEGHAN Rose, Request for Xanex for a flight in 2 days TIME SENSITIVE  Contact: [phone]  Phill Benitesila,     Patient is requesting a couple of pills, maybe 2 or 3 pills of Xanex for an upcoming flight she'll be on in the next 2 days. Please follow-up, OK to leave a message.

## 2017-08-17 ENCOUNTER — OFFICE VISIT (OUTPATIENT)
Dept: PSYCHOLOGY | Facility: CLINIC | Age: 42
End: 2017-08-17
Payer: COMMERCIAL

## 2017-08-17 DIAGNOSIS — F41.1 GAD (GENERALIZED ANXIETY DISORDER): Primary | ICD-10-CM

## 2017-08-17 PROCEDURE — 90847 FAMILY PSYTX W/PT 50 MIN: CPT | Performed by: MARRIAGE & FAMILY THERAPIST

## 2017-08-17 ASSESSMENT — PATIENT HEALTH QUESTIONNAIRE - PHQ9
5. POOR APPETITE OR OVEREATING: NOT AT ALL
SUM OF ALL RESPONSES TO PHQ QUESTIONS 1-9: 3

## 2017-08-17 ASSESSMENT — ANXIETY QUESTIONNAIRES
7. FEELING AFRAID AS IF SOMETHING AWFUL MIGHT HAPPEN: NOT AT ALL
3. WORRYING TOO MUCH ABOUT DIFFERENT THINGS: SEVERAL DAYS
5. BEING SO RESTLESS THAT IT IS HARD TO SIT STILL: NOT AT ALL
IF YOU CHECKED OFF ANY PROBLEMS ON THIS QUESTIONNAIRE, HOW DIFFICULT HAVE THESE PROBLEMS MADE IT FOR YOU TO DO YOUR WORK, TAKE CARE OF THINGS AT HOME, OR GET ALONG WITH OTHER PEOPLE: NOT DIFFICULT AT ALL
2. NOT BEING ABLE TO STOP OR CONTROL WORRYING: NOT AT ALL
6. BECOMING EASILY ANNOYED OR IRRITABLE: SEVERAL DAYS
GAD7 TOTAL SCORE: 3
1. FEELING NERVOUS, ANXIOUS, OR ON EDGE: SEVERAL DAYS

## 2017-08-17 NOTE — MR AVS SNAPSHOT
MRN:[id number]                      After Visit Summary   8/17/2017    Joyce Murry    MRN: [id number]           Visit Information        Provider Department      8/17/2017 9:00 AM NelliYareli [facility]olina [facility] Generic      Your next 10 appointments already scheduled     Sep 14, 2017  9:00 AM CDT   Return Visit with Yareli Alicia [facility] [place] ([facility] [place])    [address]  [place] [address] [phone]            Oct 12, 2017  9:00 AM CDT   Return Visit with Yareli Alicia [facility] [place] ([facility] [place])    [address]  [place] [address] [phone]              MyChart Information     [employer] gives you secure access to your electronic health record. If you see a primary care provider, you can also send messages to your care team and make appointments. If you have questions, please call your primary care clinic.  If you do not have a primary care provider, please call [phone] and they will assist you.        Care EveryWhere ID     This is your Care EveryWhere ID. This could be used by other organizations to access your [place] medical records  [id number]        Equal Access to Services     ANNIE KRISHNAMURTHY AH: Renuka junioro Sowendy, waaxda luqadaha, qaybta kaalmada adetea, abhishek khan. So [facility] [phone].    ATENCIÓN: Si habla español, tiene a uribe disposición servicios gratuitos de asistencia lingüística. Llame al [phone].    We comply with applicable federal civil rights laws and [place] laws. We do not discriminate on the basis of race, color, national origin, age, disability sex, sexual orientation or gender identity.

## 2017-08-17 NOTE — PROGRESS NOTES
"                                             Progress Note    Client Name: Joyce Murry  Date: 8/17/2017         Service Type: Individual      Session Start Time: 9:30  Session End Time: 10:15      Session Length: 45     Session #: 5     Attendees: Spouse / Significant Other    Treatment Plan Last Reviewed: 8/17/2017  PHQ-9 / GABRIEL-7 : Each session     DATA      Progress Since Last Session (Related to Symptoms / Goals / Homework):   Symptoms: Stable    Homework: Partially completed      Episode of Care Goals: Satisfactory progress - ACTION (Actively working towards change); Intervened by reinforcing change plan / affirming steps taken     Current / Ongoing Stressors and Concerns:   Couple have enjoyed vacation time and Joyce being in [place] for a few weeks. She was happy there and not being home makes her feel stressed that she still needs to make decisions about work. Continues to have irrational fears about the kids care. Was better on vacation. Thinks they are communicating better overall. Less conflict. Couple wants more tools. Discussed Joyce going off on meandering tangents which derail their talks. Asked her to consider closing a loop before bringing in a new thought. She agreed. Also when Jer is reactive and makes a \"mulligan\" that they could move forward more quickly. First rank the mulligan, and decide if he can apologize, do a high five and move on or determine if there will be a need for further discussion (5 or above). Couple to use S/L technique more as well. Also for Jer to continue to show support with Joyce's job search by not trying to fix. Also therapist to note that Jer prides himself on not being too much of a male. Values being more sensitive and female.  Joyce to make a decision about work and put a time frame around when she will do something. Gave her name of career counselor.   From previous session: Told Jer we should discuss intimacy at some point. He thinks if communication is better, " their intimacy will improve.       Treatment Objective(s) Addressed in This Session:   Identify goals for couples work.  Improve communication by Joyce validating Jer more often.  Couple to find ways to prioritize their marriage with date nights, intentional daily communication, weekend/vacations together, etc.  Find ways to partner more so they bring out the best in one another  Improve intimacy     Intervention:   Relationship counseling   Strategies to lower anxiety.   S/L technique regularly to ground their communication   Resist communicating when dysregulated.     ASSESSMENT: Current Emotional / Mental Status (status of significant symptoms):   Risk status (Self / Other harm or suicidal ideation)   Client denies current fears or concerns for personal safety.   Client denies current or recent suicidal ideation or behaviors.   Client denies current or recent homicidal ideation or behaviors.   Client denies current or recent self injurious behavior or ideation.   Client denies other safety concerns.   A safety and risk management plan has not been developed at this time, however client was given the after-hours number / 911 should there be a change in any of these risk factors.     Appearance:   Appropriate    Eye Contact:   Good    Psychomotor Behavior: Normal    Attitude:   Cooperative    Orientation:   All   Speech    Rate / Production: Normal     Volume:  Normal    Mood:    Normal   Affect:    Appropriate    Thought Content:  Clear    Thought Form:  Coherent  Logical    Insight:    Good      Medication Review:   No current psychiatric medications prescribed     Medication Compliance:   NA     Changes in Health Issues:   None reported     Chemical Use Review:   Substance Use: Chemical use reviewed, no active concerns identified      Tobacco Use: No current tobacco use.       Collateral Reports Completed:   Not Applicable    PLAN: (Client Tasks / Therapist Tasks / Other)  Use S/L technique, less managing of  one another's feelings in communicating, more concise talking, rating their mulligans, talk before session about what they want to work on.         Yareli Walker                                                         ________________________________________________________________________                                                 Treatment Plan    Client's Name: Joyce Murry  YOB: 1975    Date: 8/17/2017    DSM-V Diagnoses: 300.02 - Generalized Anxiety Disorder By History; V71.09 - No Diagnosis  Psychosocial / Contextual Factors: marital discord  WHODAS: 14    Referral / Collaboration:  Referral to another professional/service is not indicated at this time..    Anticipated number of session or this episode of care: 10      MeasurableTreatment Goal(s) related to diagnosis / functional impairment(s)  Goal 1: Client will lower GAD7 score to 3 or below.    I will know I've met my goal when I'm less anxious in general.      Objective #A (Client Action)    Client will participate in couples counseling to address communication breakdowns.  Status: Continued - Date(s):8/17/2017     Intervention(s)  Therapist will work with couple on communication..    Objective #B  Client will look to pursue work outside the home..  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    Objective #C  Client will use CBT to address anxious thinking..  Status: Continued - Date(s):8/17/2017     Intervention(s)  Therapist will teach CBT.      Client has reviewed and agreed to the above plan.      Yareli Walker  August 17, 2017

## 2017-08-18 ASSESSMENT — ANXIETY QUESTIONNAIRES: GAD7 TOTAL SCORE: 3

## 2017-09-03 ENCOUNTER — HEALTH MAINTENANCE LETTER (OUTPATIENT)
Age: 42
End: 2017-09-03

## 2017-09-14 ENCOUNTER — OFFICE VISIT (OUTPATIENT)
Dept: PSYCHOLOGY | Facility: CLINIC | Age: 42
End: 2017-09-14
Payer: COMMERCIAL

## 2017-09-14 DIAGNOSIS — F41.1 GAD (GENERALIZED ANXIETY DISORDER): Primary | ICD-10-CM

## 2017-09-14 PROCEDURE — 90847 FAMILY PSYTX W/PT 50 MIN: CPT | Performed by: MARRIAGE & FAMILY THERAPIST

## 2017-09-14 ASSESSMENT — ANXIETY QUESTIONNAIRES
6. BECOMING EASILY ANNOYED OR IRRITABLE: SEVERAL DAYS
2. NOT BEING ABLE TO STOP OR CONTROL WORRYING: SEVERAL DAYS
1. FEELING NERVOUS, ANXIOUS, OR ON EDGE: SEVERAL DAYS
GAD7 TOTAL SCORE: 6
5. BEING SO RESTLESS THAT IT IS HARD TO SIT STILL: NOT AT ALL
3. WORRYING TOO MUCH ABOUT DIFFERENT THINGS: SEVERAL DAYS
IF YOU CHECKED OFF ANY PROBLEMS ON THIS QUESTIONNAIRE, HOW DIFFICULT HAVE THESE PROBLEMS MADE IT FOR YOU TO DO YOUR WORK, TAKE CARE OF THINGS AT HOME, OR GET ALONG WITH OTHER PEOPLE: SOMEWHAT DIFFICULT
7. FEELING AFRAID AS IF SOMETHING AWFUL MIGHT HAPPEN: SEVERAL DAYS

## 2017-09-14 ASSESSMENT — PATIENT HEALTH QUESTIONNAIRE - PHQ9
5. POOR APPETITE OR OVEREATING: SEVERAL DAYS
SUM OF ALL RESPONSES TO PHQ QUESTIONS 1-9: 6

## 2017-09-14 NOTE — PROGRESS NOTES
Progress Note    Client Name: Joyce Murry  Date: 9/13/2017         Service Type: Individual      Session Start Time: 9:30  Session End Time: 10:15      Session Length: 45     Session #: 6     Attendees: Spouse / Significant Other    Treatment Plan Last Reviewed: 8/17/2017  PHQ-9 / GABRIEL-7 : Each session     DATA      Progress Since Last Session (Related to Symptoms / Goals / Homework):   Symptoms: Stable    Homework: Partially completed      Episode of Care Goals: Satisfactory progress - ACTION (Actively working towards change); Intervened by reinforcing change plan / affirming steps taken     Current / Ongoing Stressors and Concerns:  Couple talked about goals today. Both think they  Need to increase their level of intimacy although Joyce isn't sure she really wants to. Says she doesn't have much libido and not sure why. Sometimes she feels angry about having to be physical. Will tell Jer.  Couple feels their day to day communicating has been better and they don't bicker as much. However they feel mostly like roommates. Joyce says they don't even kiss anymore. There is some sadness about this and she wonders if they need to have an open relationship. Says she wouldn't care if Jer had sex with someone else. She feels guilty for not meeting his needs. She feels guilty anytime she isn't meeting the needs of the girls. Joyce doesn't feel she enjoys her own life and that it is all about others. She did choose these things but she doesn't enjoy them and feels obligated to much without enjoyment. Also she continues to feel less of a person without being able to contribute. Many of these beliefs come from watching her mother do it all and she said she didn't want to be like that. Jer does not expect her to be her mother either. He is supportive of whatever she wants to do. Couple may try to increase some intimacy by faking it until they make it.      Treatment Objective(s)  Addressed in This Session:   Identify goals for couples work.  Improve communication by Joyce validating Jer more often.  Couple to find ways to prioritize their marriage with date nights, intentional daily communication, weekend/vacations together, etc.  Find ways to partner more so they bring out the best in one another  Improve intimacy. Fake it till you make it. Joyce to notice what makes her feel more drawn to Jer. Notice when she might feel sexual. What is going on in that moment, what's different. Joyce also to consider new therapist and more frequent sessions so she can begin to make progress more rapidly.     Intervention:   Relationship counseling   Strategies to lower anxiety.   S/L technique regularly to ground their communication   Resist communicating when dysregulated.     ASSESSMENT: Current Emotional / Mental Status (status of significant symptoms):   Risk status (Self / Other harm or suicidal ideation)   Client denies current fears or concerns for personal safety.   Client denies current or recent suicidal ideation or behaviors.   Client denies current or recent homicidal ideation or behaviors.   Client denies current or recent self injurious behavior or ideation.   Client denies other safety concerns.   A safety and risk management plan has not been developed at this time, however client was given the after-hours number / 911 should there be a change in any of these risk factors.     Appearance:   Appropriate    Eye Contact:   Good    Psychomotor Behavior: Normal    Attitude:   Cooperative    Orientation:   All   Speech    Rate / Production: Normal     Volume:  Normal    Mood:    Normal   Affect:    Appropriate    Thought Content:  Clear    Thought Form:  Coherent  Logical    Insight:    Good      Medication Review:   No current psychiatric medications prescribed     Medication Compliance:   NA     Changes in Health Issues:   None reported     Chemical Use Review:   Substance Use: Chemical use  reviewed, no active concerns identified      Tobacco Use: No current tobacco use.       Collateral Reports Completed:   Not Applicable    PLAN: (Client Tasks / Therapist Tasks / Other)  Use S/L technique, less managing of one another's feelings in communicating, more concise talking, rating their mulligans, talk before session about what they want to work on.   Joyce to consider increasing frequency of individual therapy, attempt to increase intimacy and notice what works, what doesn't. Joyce to notice when she feels more drawn to Jer.       Yareli Walker                                                         ________________________________________________________________________                                                 Treatment Plan    Client's Name: Joyce Murry  YOB: 1975    Date: 8/17/2017    DSM-V Diagnoses: 300.02 - Generalized Anxiety Disorder By History; V71.09 - No Diagnosis  Psychosocial / Contextual Factors: marital discord  WHODAS: 14    Referral / Collaboration:  Referral to another professional/service is not indicated at this time..    Anticipated number of session or this episode of care: 10      MeasurableTreatment Goal(s) related to diagnosis / functional impairment(s)  Goal 1: Client will lower GAD7 score to 3 or below.    I will know I've met my goal when I'm less anxious in general.      Objective #A (Client Action)    Client will participate in couples counseling to address communication breakdowns.  Status: Continued - Date(s):8/17/2017     Intervention(s)  Therapist will work with couple on communication..    Objective #B  Client will look to pursue work outside the home..  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    Objective #C  Client will use CBT to address anxious thinking..  Status: Continued - Date(s):8/17/2017     Intervention(s)  Therapist will teach  CBT.      Client has reviewed and agreed to the above plan.      Yareli Walker  August 17, 2017

## 2017-09-14 NOTE — MR AVS SNAPSHOT
MRN:[id number]                      After Visit Summary   9/14/2017    Joyce Murry    MRN: [id number]           Visit Information        Provider Department      9/14/2017 9:00 AM ДмитрийcucaYareli carolina [facility] Generic      Your next 10 appointments already scheduled     Oct 12, 2017  9:00 AM CDT   Return Visit with Yareli Alicia [facility] ([facility])    [address]  [place] [id number] [phone]            Nov 16, 2017  9:00 AM CST   Return Visit with Yareli Alicia [facility] ([facility])    [address]  [place] [id number] [phone]              MyChart Information     [employer] gives you secure access to your electronic health record. If you see a primary care provider, you can also send messages to your care team and make appointments. If you have questions, please call your primary care clinic.  If you do not have a primary care provider, please call [phone] and they will assist you.        Care EveryWhere ID     This is your Care EveryWhere ID. This could be used by other organizations to access your [place] medical records  [id number]        Equal Access to Services     ANNIE KRISHNAMURTHY : Renuka junioro Sowendy, waaxda luqadaha, qaybta kaalmada adeegyada, abhishek khan. So [facility] [phone].    ATENCIÓN: Si habla español, tiene a uribe disposición servicios gratuitos de asistencia lingüística. Llame al [phone].    We comply with applicable federal civil rights laws and [place] laws. We do not discriminate on the basis of race, color, national origin, age, disability sex, sexual orientation or gender identity.

## 2017-09-15 ASSESSMENT — ANXIETY QUESTIONNAIRES: GAD7 TOTAL SCORE: 6

## 2017-10-12 ENCOUNTER — OFFICE VISIT (OUTPATIENT)
Dept: PSYCHOLOGY | Facility: CLINIC | Age: 42
End: 2017-10-12
Payer: COMMERCIAL

## 2017-10-12 DIAGNOSIS — F41.1 GAD (GENERALIZED ANXIETY DISORDER): Primary | ICD-10-CM

## 2017-10-12 PROCEDURE — 90834 PSYTX W PT 45 MINUTES: CPT | Performed by: MARRIAGE & FAMILY THERAPIST

## 2017-10-12 ASSESSMENT — ANXIETY QUESTIONNAIRES
5. BEING SO RESTLESS THAT IT IS HARD TO SIT STILL: NOT AT ALL
IF YOU CHECKED OFF ANY PROBLEMS ON THIS QUESTIONNAIRE, HOW DIFFICULT HAVE THESE PROBLEMS MADE IT FOR YOU TO DO YOUR WORK, TAKE CARE OF THINGS AT HOME, OR GET ALONG WITH OTHER PEOPLE: SOMEWHAT DIFFICULT
1. FEELING NERVOUS, ANXIOUS, OR ON EDGE: SEVERAL DAYS
6. BECOMING EASILY ANNOYED OR IRRITABLE: MORE THAN HALF THE DAYS
2. NOT BEING ABLE TO STOP OR CONTROL WORRYING: SEVERAL DAYS
3. WORRYING TOO MUCH ABOUT DIFFERENT THINGS: SEVERAL DAYS
GAD7 TOTAL SCORE: 8
7. FEELING AFRAID AS IF SOMETHING AWFUL MIGHT HAPPEN: MORE THAN HALF THE DAYS

## 2017-10-12 ASSESSMENT — PATIENT HEALTH QUESTIONNAIRE - PHQ9
5. POOR APPETITE OR OVEREATING: SEVERAL DAYS
SUM OF ALL RESPONSES TO PHQ QUESTIONS 1-9: 13

## 2017-10-12 NOTE — MR AVS SNAPSHOT
MRN:[id number]                      After Visit Summary   10/12/2017    Joyce Murry    MRN: [id number]           Visit Information        Provider Department      10/12/2017 9:00 AM Denise Yareli Alicia [facility] Generic      Your next 10 appointments already scheduled     Nov 16, 2017  9:00 AM CST   Return Visit with Yareli Walker   [facility] ([facility])    [address]  [facility] [address] [phone]              MyChart Information     Face to Face [employer] gives you secure access to your electronic health record. If you see a primary care provider, you can also send messages to your care team and make appointments. If you have questions, please call your primary care clinic.  If you do not have a primary care provider, please call [phone] and they will assist you.        Care EveryWhere ID     This is your Care EveryWhere ID. This could be used by other organizations to access your [place] medical records  [id number]        Equal Access to Services     ANNIE KRISHNAMURTHY : Hadii dulce maria machado hadasho Soomaali, waaxda luqadaha, qaybta kaalmada adeegyada, abhishek khan. So [facility] [phone].    ATENCIÓN: Si habla español, tiene a uribe disposición servicios gratuitos de asistencia lingüística. Llame al [phone].    We comply with applicable federal civil rights laws and [place] laws. We do not discriminate on the basis of race, color, national origin, age, disability, sex, sexual orientation, or gender identity.

## 2017-10-12 NOTE — PROGRESS NOTES
Progress Note    Client Name: Joyce Murry  Date: 10/12/2017         Service Type: Individual      Session Start Time: 9:30  Session End Time: 10:15      Session Length: 45     Session #: 7     Attendees: Client    Treatment Plan Last Reviewed: 8/17/2017  PHQ-9 / GABRIEL-7 : Each session     DATA      Progress Since Last Session (Related to Symptoms / Goals / Homework):   Symptoms: Stable    Homework: Partially completed      Episode of Care Goals: Satisfactory progress - ACTION (Actively working towards change); Intervened by reinforcing change plan / affirming steps taken     Current / Ongoing Stressors and Concerns:  Joyce comes on her own today. We talk about sexual intimacy which is an issue for her but also validated for many couples. Client talks about wanting the butterly feelings and is sad this may never return. Tried normalizing and readjusting expectations. Joyce is very unhappy with her situation of being dependent on Jer and not working. She has beliefs about this that interfere in her ability to accept her circumstances. She needs to work and provide. 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 Has thought she could pursue a business of her own but shared concerns therapist has about this being more burdensome as she will still attempt to do the household as well. She is not growing which therapist told her Grow or Die as well as in her head, she's dead. Needs to stop spending so much time in her thoughts and begin to take action. She is paralyzed with fear, boredom and limiting beliefs. She knows the marital issues would be better if she was working and on a career path.     Treatment Objective(s) Addressed in This Session:   Identify goals for couples work.  Improve communication by Joyce validating Jer more often.  Joyce to get resume updated and search for work  Consider interviewing people who do what she would like to do.  Get  out of her head     Intervention:   Relationship counseling   Strategies to lower anxiety.   S/L technique regularly to ground their communication   Resist communicating when dysregulated.     ASSESSMENT: Current Emotional / Mental Status (status of significant symptoms):   Risk status (Self / Other harm or suicidal ideation)   Client denies current fears or concerns for personal safety.   Client denies current or recent suicidal ideation or behaviors.   Client denies current or recent homicidal ideation or behaviors.   Client denies current or recent self injurious behavior or ideation.   Client denies other safety concerns.   A safety and risk management plan has not been developed at this time, however client was given the after-hours number / 911 should there be a change in any of these risk factors.     Appearance:   Appropriate    Eye Contact:   Good    Psychomotor Behavior: Normal    Attitude:   Cooperative    Orientation:   All   Speech    Rate / Production: Normal     Volume:  Normal    Mood:    Normal   Affect:    Appropriate    Thought Content:  Clear    Thought Form:  Coherent  Logical    Insight:    Good      Medication Review:   No current psychiatric medications prescribed   Revisited idea of meds for Joyce. She is resistant and wants to try other things.      Medication Compliance:   NA     Changes in Health Issues:   None reported     Chemical Use Review:   Substance Use: Chemical use reviewed, no active concerns identified      Tobacco Use: No current tobacco use.       Collateral Reports Completed:   Not Applicable    PLAN: (Client Tasks / Therapist Tasks / Other)  Use S/L technique, less managing of one another's feelings in communicating, more concise talking, rating their mulligans, talk before session about what they want to work on.   Joyce to consider increasing frequency of individual therapy, attempt to increase intimacy and notice what works, what doesn't. Joyce to notice when she feels  more drawn to Jer.   Joyce to job search. Update resume, possibly information interview, head kelsea, etc. Stay out of her head, schedule her day, get out of house, etc.      Yareli Ravin Walker                                                         ________________________________________________________________________                                                 Treatment Plan    Client's Name: Joyce Murry  YOB: 1975    Date: 8/17/2017    DSM-V Diagnoses: 300.02 - Generalized Anxiety Disorder By History; V71.09 - No Diagnosis  Psychosocial / Contextual Factors: marital discord  WHODAS: 14    Referral / Collaboration:  Referral to another professional/service is not indicated at this time..    Anticipated number of session or this episode of care: 10      MeasurableTreatment Goal(s) related to diagnosis / functional impairment(s)  Goal 1: Client will lower GAD7 score to 3 or below.    I will know I've met my goal when I'm less anxious in general.      Objective #A (Client Action)    Client will participate in couples counseling to address communication breakdowns.  Status: Continued - Date(s):8/17/2017     Intervention(s)  Therapist will work with couple on communication..    Objective #B  Client will look to pursue work outside the home..  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    Objective #C  Client will use CBT to address anxious thinking..  Status: Continued - Date(s):8/17/2017     Intervention(s)  Therapist will teach CBT.      Client has reviewed and agreed to the above plan.      Yareli Walker  August 17, 2017

## 2017-10-13 ASSESSMENT — ANXIETY QUESTIONNAIRES: GAD7 TOTAL SCORE: 8

## 2017-11-16 ENCOUNTER — OFFICE VISIT (OUTPATIENT)
Dept: PSYCHOLOGY | Facility: CLINIC | Age: 42
End: 2017-11-16
Payer: COMMERCIAL

## 2017-11-16 DIAGNOSIS — F41.1 GAD (GENERALIZED ANXIETY DISORDER): Primary | ICD-10-CM

## 2017-11-16 PROCEDURE — 90847 FAMILY PSYTX W/PT 50 MIN: CPT | Performed by: MARRIAGE & FAMILY THERAPIST

## 2017-11-16 ASSESSMENT — PATIENT HEALTH QUESTIONNAIRE - PHQ9: SUM OF ALL RESPONSES TO PHQ QUESTIONS 1-9: 5

## 2017-11-16 NOTE — PROGRESS NOTES
Progress Note    Client Name: Joyce Murry  Date: 11/17/2017         Service Type: Individual      Session Start Time: 9:30  Session End Time: 10:15      Session Length: 45     Session #: 8     Attendees: Client and Spouse / Significant Other    Treatment Plan Last Reviewed: 11/17/2017  PHQ-9 / GABRIEL-7 : Each session     DATA      Progress Since Last Session (Related to Symptoms / Goals / Homework):   Symptoms: Stable    Homework: Partially completed      Episode of Care Goals: Satisfactory progress - ACTION (Actively working towards change); Intervened by reinforcing change plan / affirming steps taken     Current / Ongoing Stressors and Concerns:  Joyce and Jer want to work on a communication piece today. Jer is very stressed at work with limited capacity for processing with Joyce. Asked her to make her request of him before explaining all the background. This will help him focus better and not get irritable. Talked about his owners manual which therapist congratulates him for offering to her as most men do not provide this and their wives end up creating conflict unwittingly. However Joyce feels it is one more way that she is repressed in her life. Talked about reframing the manual so it doesn't overwhelm or repress her. She is willing to wokr on this request. Also talked about tackling household projects and how they talk but never activate. Suggested one of them needs to be the activator and it makes most sense it would be Joyce. She worries about spending money. Suggested possibly her own household account or becoming more aware of finances so she can decide for herself. Also create a punch list room by room and she and Jer can prioritize what they want to work on first. Jer may need to let go of things he'd like to do around the house himself. Joyce likes her new individual therapist. Really had no anxiety when traveling with friends and her sister. Feels  empowered when not at home.     Treatment Objective(s) Addressed in This Session:   Identify goals for couples work.  Improve communication by Joyce validating Jer more often.  Joyce to get resume updated and search for work  Consider interviewing people who do what she would like to do.  Get out of her head     Intervention:   Relationship counseling   Strategies to lower anxiety.   S/L technique regularly to ground their communication   Resist communicating when dysregulated.     ASSESSMENT: Current Emotional / Mental Status (status of significant symptoms):   Risk status (Self / Other harm or suicidal ideation)   Client denies current fears or concerns for personal safety.   Client denies current or recent suicidal ideation or behaviors.   Client denies current or recent homicidal ideation or behaviors.   Client denies current or recent self injurious behavior or ideation.   Client denies other safety concerns.   A safety and risk management plan has not been developed at this time, however client was given the after-hours number / 911 should there be a change in any of these risk factors.     Appearance:   Appropriate    Eye Contact:   Good    Psychomotor Behavior: Normal    Attitude:   Cooperative    Orientation:   All   Speech    Rate / Production: Normal     Volume:  Normal    Mood:    Normal   Affect:    Appropriate    Thought Content:  Clear    Thought Form:  Coherent  Logical    Insight:    Good      Medication Review:   No current psychiatric medications prescribed   Revisited idea of meds for Joyce. She is resistant and wants to try other things.      Medication Compliance:   NA     Changes in Health Issues:   None reported     Chemical Use Review:   Substance Use: Chemical use reviewed, no active concerns identified      Tobacco Use: No current tobacco use.       Collateral Reports Completed:   Not Applicable    PLAN: (Client Tasks / Therapist Tasks / Other)  Use S/L technique, less managing of one  another's feelings in communicating, more concise talking, rating their mulligans, talk before session about what they want to work on.   Joyce to consider increasing frequency of individual therapy, attempt to increase intimacy and notice what works, what doesn't. Joyce to notice when she feels more drawn to Jer.   Joyce to job search. Update resume, possibly information interview, head kelsea, etc. Stay out of her head, schedule her day, get out of house, etc.  Joyce to be the activator at home. Also to make requests of Jer versus talking off the top of her head.    Yareli Alicia [place]                                                         ________________________________________________________________________                                                 Treatment Plan    Client's Name: Joyce Murry  YOB: 1975    Date: 11/17/2017    DSM-V Diagnoses: 300.02 - Generalized Anxiety Disorder By History; V71.09 - No Diagnosis  Psychosocial / Contextual Factors: marital discord  WHODAS: 14    Referral / Collaboration:  Referral to another professional/service is not indicated at this time..    Anticipated number of session or this episode of care: 10      MeasurableTreatment Goal(s) related to diagnosis / functional impairment(s)  Goal 1: Client will lower GAD7 score to 3 or below.    I will know I've met my goal when I'm less anxious in general.      Objective #A (Client Action)    Client will participate in couples counseling to address communication breakdowns.  Status: Continued - Date(s):11/17/2017     Intervention(s)  Therapist will work with couple on communication..    Objective #B  Client will look to pursue work outside the home..  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    Objective #C  Client will use CBT to address anxious thinking..  Status: Continued - Date(s):11/17/2017      Intervention(s)  Therapist will teach CBT.      Client has reviewed and agreed to the above plan.      Yareli Walker  November 17, 2017

## 2017-11-16 NOTE — MR AVS SNAPSHOT
MRN:[id number]                      After Visit Summary   11/16/2017    Joyce Murry    MRN: [id number]           Visit Information        Provider Department      11/16/2017 9:00 AM Denise Yareli Alicia [facility] Generic      Your next 10 appointments already scheduled     Dec 14, 2017  9:00 AM CST   Return Visit with Yareli Walker   [facility] [place] ([facility])    [address]  [facility] [address] [phone]            Dec 19, 2017   Procedure with Leona Joyce MD   [facility] ([facility])    [address] [facility] [address] [phone]           [facility] is located at [address] [facility] Information     [employer] gives you secure access to your electronic health record. If you see a primary care provider, you can also send messages to your care team and make appointments. If you have questions, please call your primary care clinic.  If you do not have a primary care provider, please call [phone] and they will assist you.        Care EveryWhere ID     This is your Care EveryWhere ID. This could be used by other organizations to access your [place] medical records  [id number]        Equal Access to Services     ANNIE KRISHNAMURTHY : Renuka junioro Sojorge albertoali, waaxda luqadaha, qaybta kaalmada adeegyada, abhishek khan. So [facility] [phone].    ATENCIÓN: Si habla español, tiene a uribe disposición servicios gratuitos de asistencia lingüística. Llame al [phone].    We comply with applicable federal civil rights laws and [place] laws. We do not discriminate on the basis of race, color, national origin, age, disability, sex, sexual orientation, or gender identity.

## 2017-12-14 ENCOUNTER — OFFICE VISIT (OUTPATIENT)
Dept: PSYCHOLOGY | Facility: CLINIC | Age: 42
End: 2017-12-14
Payer: COMMERCIAL

## 2017-12-14 DIAGNOSIS — F41.1 GAD (GENERALIZED ANXIETY DISORDER): Primary | ICD-10-CM

## 2017-12-14 PROCEDURE — 90847 FAMILY PSYTX W/PT 50 MIN: CPT | Performed by: MARRIAGE & FAMILY THERAPIST

## 2017-12-14 RX ORDER — ONDANSETRON 4 MG/1
4 TABLET, ORALLY DISINTEGRATING ORAL PRN
COMMUNITY
Start: 2017-11-07 | End: 2018-01-11

## 2017-12-14 RX ORDER — POLYETHYLENE GLYCOL 3350 17 G/17G
0.4 POWDER, FOR SOLUTION ORAL ONCE
COMMUNITY
Start: 2017-11-07 | End: 2018-01-11

## 2017-12-14 RX ORDER — ACYCLOVIR 200 MG/1
2 CAPSULE ORAL EVERY 8 HOURS
COMMUNITY
Start: 2017-04-17 | End: 2023-09-29

## 2017-12-14 ASSESSMENT — PATIENT HEALTH QUESTIONNAIRE - PHQ9
SUM OF ALL RESPONSES TO PHQ QUESTIONS 1-9: 7
5. POOR APPETITE OR OVEREATING: SEVERAL DAYS

## 2017-12-14 ASSESSMENT — ANXIETY QUESTIONNAIRES
6. BECOMING EASILY ANNOYED OR IRRITABLE: MORE THAN HALF THE DAYS
2. NOT BEING ABLE TO STOP OR CONTROL WORRYING: SEVERAL DAYS
5. BEING SO RESTLESS THAT IT IS HARD TO SIT STILL: SEVERAL DAYS
3. WORRYING TOO MUCH ABOUT DIFFERENT THINGS: SEVERAL DAYS
GAD7 TOTAL SCORE: 9
1. FEELING NERVOUS, ANXIOUS, OR ON EDGE: SEVERAL DAYS
7. FEELING AFRAID AS IF SOMETHING AWFUL MIGHT HAPPEN: MORE THAN HALF THE DAYS
IF YOU CHECKED OFF ANY PROBLEMS ON THIS QUESTIONNAIRE, HOW DIFFICULT HAVE THESE PROBLEMS MADE IT FOR YOU TO DO YOUR WORK, TAKE CARE OF THINGS AT HOME, OR GET ALONG WITH OTHER PEOPLE: SOMEWHAT DIFFICULT

## 2017-12-14 NOTE — MR AVS SNAPSHOT
MRN:[id number]                      After Visit Summary   12/14/2017    Joyce Murry    MRN: [id number]           Visit Information        Provider Department      12/14/2017 9:00 AM Denise Yareli [facility] Generic      Your next 10 appointments already scheduled     Dec 19, 2017   Procedure with Leona Joyce MD   [facility] ([facility])    [address]  [facility] [address]   [phone]           [facility] is located at [address] [place]            Jan 11, 2018  9:00 AM CST   Return Visit with Yarelijuan Alicia [facility] [place] ([facility] [place])    [address]  [place] [id number] [phone]            Feb 08, 2018  9:00 AM CST   Return Visit with Yareli Alicia [facility] [place] ([facility] [place])    [address]  [facility] [id number] [phone]              MyChart Information     [employer] gives you secure access to your electronic health record. If you see a primary care provider, you can also send messages to your care team and make appointments. If you have questions, please call your primary care clinic.  If you do not have a primary care provider, please call [phone] and they will assist you.        Care EveryWhere ID     This is your Care EveryWhere ID. This could be used by other organizations to access your [facility] medical records  [id number]        Equal Access to Services     ANNIE KRISHNAMURTHY : Hadii aad ku hadasho Soomaali, waaxda luqadaha, qaybta kaalmada adeegyada, abhishek stokes . So [facility] [phone].    ATENCIÓN: Si habla español, tiene a uribe disposición servicios gratuitos de asistencia lingüística. Llame al [phone].    We comply with applicable federal civil rights laws and [place] laws. We do not discriminate on the basis of race, color, national origin, age,  disability, sex, sexual orientation, or gender identity.

## 2017-12-14 NOTE — PROGRESS NOTES
Progress Note    Client Name: Joyce Murry  Date: 12/14/2017         Service Type: Individual      Session Start Time: 9:30  Session End Time: 10:15      Session Length: 45     Session #:      Attendees: Client and Spouse / Significant Other    Treatment Plan Last Reviewed: 11/17/2017  PHQ-9 / GABRIEL-7 : Each session     DATA      Progress Since Last Session (Related to Symptoms / Goals / Homework):   Symptoms: Stable    Homework: Partially completed      Episode of Care Goals: Satisfactory progress - ACTION (Actively working towards change); Intervened by reinforcing change plan / affirming steps taken     Current / Ongoing Stressors and Concerns:  Joyce and Jer want to work on better understanding how to deal with either Jer's or their 6 year old daughter Imigen's anger issues. It puts a strain on 9 year old Tulla who is compliant and easy going as well as on the marriage. Processed how Joyce needs to be a collander versus a container or sponge for Jer and daughter. Also couple have not been on the same page with parenting. They've lacked consequences and similar approaches so there hasn't been consistency. Talked about consequences that fit the crime. Perhaps tabasco for swearing (or soap in the mouth). Find a consequence they feel comfortable with and have family meeting to talk about what they want to begin doing differently.        Treatment Objective(s) Addressed in This Session:   Identify goals for couples work.  Improve communication by Joyce validating Jer more often.  Couple to devise list of consequences for daughter and begin implementing them balanced with praise.  1,2, 3 Magic  Super Nanny     Intervention:   Relationship counseling   Strategies to lower anxiety.   S/L technique regularly to ground their communication   Resist communicating when dysregulated.     ASSESSMENT: Current Emotional / Mental Status (status of significant symptoms):   Risk  status (Self / Other harm or suicidal ideation)   Client denies current fears or concerns for personal safety.   Client denies current or recent suicidal ideation or behaviors.   Client denies current or recent homicidal ideation or behaviors.   Client denies current or recent self injurious behavior or ideation.   Client denies other safety concerns.   A safety and risk management plan has not been developed at this time, however client was given the after-hours number / 911 should there be a change in any of these risk factors.     Appearance:   Appropriate    Eye Contact:   Good    Psychomotor Behavior: Normal    Attitude:   Cooperative    Orientation:   All   Speech    Rate / Production: Normal     Volume:  Normal    Mood:    Normal   Affect:    Appropriate    Thought Content:  Clear    Thought Form:  Coherent  Logical    Insight:    Good      Medication Review:   No current psychiatric medications prescribed   Revisited idea of meds for Joyce. She is resistant and wants to try other things.      Medication Compliance:   NA     Changes in Health Issues:   None reported     Chemical Use Review:   Substance Use: Chemical use reviewed, no active concerns identified      Tobacco Use: No current tobacco use.       Collateral Reports Completed:   Not Applicable    PLAN: (Client Tasks / Therapist Tasks / Other)  Use S/L technique, less managing of one another's feelings in communicating, more concise talking, rating their mulligans, talk before session about what they want to work on.   Joyce to be the activator at home. Also to make requests of Jer versus talking off the top of her head.  Consequences for bad behavior for the youngest, 1,2,3 Magic, consistency as parents, family meeting, Super Nanny.    Yareli Walker                                                         ________________________________________________________________________                                                 Treatment  Plan    Client's Name: Joyce Murry  YOB: 1975    Date: 11/17/2017    DSM-V Diagnoses: 300.02 - Generalized Anxiety Disorder By History; V71.09 - No Diagnosis  Psychosocial / Contextual Factors: marital discord  WHODAS: 14    Referral / Collaboration:  Referral to another professional/service is not indicated at this time..    Anticipated number of session or this episode of care: 10      MeasurableTreatment Goal(s) related to diagnosis / functional impairment(s)  Goal 1: Client will lower GAD7 score to 3 or below.    I will know I've met my goal when I'm less anxious in general.      Objective #A (Client Action)    Client will participate in couples counseling to address communication breakdowns.  Status: Continued - Date(s):11/17/2017     Intervention(s)  Therapist will work with couple on communication..    Objective #B  Client will look to pursue work outside the home..  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    Objective #C  Client will use CBT to address anxious thinking..  Status: Continued - Date(s):11/17/2017     Intervention(s)  Therapist will teach CBT.      Client has reviewed and agreed to the above plan.      Yareli Walker  November 17, 2017

## 2017-12-15 ASSESSMENT — ANXIETY QUESTIONNAIRES: GAD7 TOTAL SCORE: 9

## 2017-12-19 ENCOUNTER — HOSPITAL ENCOUNTER (OUTPATIENT)
Facility: CLINIC | Age: 42
Discharge: HOME | End: 2017-12-19
Attending: COLON & RECTAL SURGERY | Admitting: COLON & RECTAL SURGERY
Payer: COMMERCIAL

## 2017-12-19 VITALS
SYSTOLIC BLOOD PRESSURE: 96 MMHG | OXYGEN SATURATION: 100 % | WEIGHT: 128 LBS | DIASTOLIC BLOOD PRESSURE: 73 MMHG | BODY MASS INDEX: 20.09 KG/M2 | HEIGHT: 67 IN | RESPIRATION RATE: 12 BRPM

## 2017-12-19 LAB — COLONOSCOPY: NORMAL

## 2017-12-19 PROCEDURE — 25000128 H RX IP 250 OP 636: Performed by: COLON & RECTAL SURGERY

## 2017-12-19 PROCEDURE — G0500 MOD SEDAT ENDO SERVICE >5YRS: HCPCS | Performed by: COLON & RECTAL SURGERY

## 2017-12-19 PROCEDURE — 45378 DIAGNOSTIC COLONOSCOPY: CPT | Performed by: COLON & RECTAL SURGERY

## 2017-12-19 RX ORDER — FENTANYL CITRATE 50 UG/ML
INJECTION, SOLUTION INTRAMUSCULAR; INTRAVENOUS PRN
Status: DISCONTINUED | OUTPATIENT
Start: 2017-12-19 | End: 2017-12-19 | Stop reason: HOSPADM

## 2017-12-19 RX ORDER — LIDOCAINE 40 MG/G
CREAM TOPICAL
Status: DISCONTINUED | OUTPATIENT
Start: 2017-12-19 | End: 2017-12-19 | Stop reason: HOSPADM

## 2017-12-19 RX ORDER — ONDANSETRON 4 MG/1
4 TABLET, ORALLY DISINTEGRATING ORAL EVERY 6 HOURS PRN
Status: DISCONTINUED | OUTPATIENT
Start: 2017-12-19 | End: 2017-12-19 | Stop reason: HOSPADM

## 2017-12-19 RX ORDER — NALOXONE HYDROCHLORIDE 0.4 MG/ML
.1-.4 INJECTION, SOLUTION INTRAMUSCULAR; INTRAVENOUS; SUBCUTANEOUS
Status: DISCONTINUED | OUTPATIENT
Start: 2017-12-19 | End: 2017-12-19 | Stop reason: HOSPADM

## 2017-12-19 RX ORDER — FLUMAZENIL 0.1 MG/ML
0.2 INJECTION, SOLUTION INTRAVENOUS
Status: DISCONTINUED | OUTPATIENT
Start: 2017-12-19 | End: 2017-12-19 | Stop reason: HOSPADM

## 2017-12-19 RX ORDER — ONDANSETRON 2 MG/ML
4 INJECTION INTRAMUSCULAR; INTRAVENOUS
Status: DISCONTINUED | OUTPATIENT
Start: 2017-12-19 | End: 2017-12-19 | Stop reason: HOSPADM

## 2017-12-19 RX ORDER — ONDANSETRON 2 MG/ML
4 INJECTION INTRAMUSCULAR; INTRAVENOUS EVERY 6 HOURS PRN
Status: DISCONTINUED | OUTPATIENT
Start: 2017-12-19 | End: 2017-12-19 | Stop reason: HOSPADM

## 2017-12-19 NOTE — H&P
Pre-Endoscopy History and Physical     Joyce Murry MRN# [id number]   YOB: 1975 Age: 42 year old     Date of Procedure: 12/19/2017  Primary care provider: Cherie Rose  Type of Endoscopy: colonoscopy  Reason for Procedure: rectal bleeding  Type of Anesthesia Anticipated: Moderate Sedation    HPI:    Joyce is a 42 year old female who will be undergoing the above procedure.      A history and physical has been performed. The patient's medications and allergies have been reviewed. The risks and benefits of the procedure and the sedation options and risks were discussed with the patient.  All questions were answered and informed consent was obtained.      She denies a personal or family history of anesthesia complications or bleeding disorders.     Allergies   Allergen Reactions     Augmentin Rash        Prior to Admission Medications   Prescriptions Last Dose Informant Patient Reported? Taking?   ALPRAZolam (XANAX) 0.25 MG tablet Unknown at Unknown time  No No   Sig: Take 1 tablet before flying in an airplane; repeat as needed and 30 minutes.   acyclovir (ZOVIRAX) 200 MG capsule 12/18/2017 at Unknown time  Yes Yes   Sig: Take 2 capsules by mouth every 8 hours   ondansetron (ZOFRAN-ODT) 4 MG ODT tab never took  Yes Yes   Sig: Take 4 mg by mouth as needed   polyethylene glycol (MIRALAX/GLYCOLAX) powder 12/18/2017  Yes Yes   Sig: Take 0.4 g/kg by mouth once For colonoscopy prep      Facility-Administered Medications: None       Patient Active Problem List   Diagnosis     Dyslipidemia, goal LDL below 160        Past Medical History:   Diagnosis Date     Genital herpes      History of hyperlipidemia, improved as of 2016 with diet and exercise         Past Surgical History:   Procedure Laterality Date     COLONOSCOPY  12/19/2017    Dr. Isiah GOLDSTEIN     D & C  2010     HEAD & NECK SURGERY      wisdom teeth removed       Social History   Substance Use Topics     Smoking status: Former Smoker     Quit date:  "2/20/2002     Smokeless tobacco: Never Used     Alcohol use Yes      Comment: rarely       Family History   Problem Relation Age of Onset     Hypertension Father      pulmonary HTN     HEART DISEASE Father 55     CAD-      DIABETES Father 65     type 2     Breast Cancer Mother 63     diganosed age 50     Lung Cancer Maternal Grandfather           Breast Cancer Paternal Aunt      Colon Cancer No family hx of        REVIEW OF SYSTEMS:     5 point ROS negative except as noted above in HPI, including Gen., Resp., CV, GI &  system review.      PHYSICAL EXAM:   Ht 1.689 m (5' 6.5\")  Wt 58.1 kg (128 lb)  BMI 20.35 kg/m2 Estimated body mass index is 20.35 kg/(m^2) as calculated from the following:    Height as of this encounter: 1.689 m (5' 6.5\").    Weight as of this encounter: 58.1 kg (128 lb).   GENERAL APPEARANCE: healthy and alert  MENTAL STATUS: alert  AIRWAY EXAM: Mallampatti Class I (visualization of the soft palate, fauces, uvula, anterior and posterior pillars)  RESP: lungs clear to auscultation - no rales, rhonchi or wheezes  CV: regular rates and rhythm      DIAGNOSTICS:    Not indicated      IMPRESSION   ASA Class 2 - Mild systemic disease        PLAN:       Plan for colonoscopy. We discussed the risks, benefits and alternatives and the patient wished to proceed.    The above has been forwarded to the consulting provider.      Signed Electronically by: Leona Joyce MD  December 19, 2017    "

## 2017-12-19 NOTE — OP NOTE
See Provation Note In Chart    Leona Joyce MD  [facility]  Office Phone # [phone]

## 2018-01-11 ENCOUNTER — OFFICE VISIT (OUTPATIENT)
Dept: INTERNAL MEDICINE | Facility: CLINIC | Age: 43
End: 2018-01-11
Payer: COMMERCIAL

## 2018-01-11 VITALS
HEART RATE: 55 BPM | BODY MASS INDEX: 20.45 KG/M2 | DIASTOLIC BLOOD PRESSURE: 78 MMHG | RESPIRATION RATE: 20 BRPM | WEIGHT: 128.6 LBS | SYSTOLIC BLOOD PRESSURE: 113 MMHG | OXYGEN SATURATION: 96 %

## 2018-01-11 DIAGNOSIS — M62.830 SPASM OF BACK MUSCLES: ICD-10-CM

## 2018-01-11 DIAGNOSIS — S39.012A STRAIN OF LUMBAR REGION, INITIAL ENCOUNTER: Primary | ICD-10-CM

## 2018-01-11 RX ORDER — CYCLOBENZAPRINE HCL 5 MG
5-10 TABLET ORAL 2 TIMES DAILY PRN
Qty: 20 TABLET | Refills: 0 | Status: SHIPPED | OUTPATIENT
Start: 2018-01-11 | End: 2018-11-19

## 2018-01-11 ASSESSMENT — PAIN SCALES - GENERAL: PAINLEVEL: MODERATE PAIN (4)

## 2018-01-11 NOTE — MR AVS SNAPSHOT
After Visit Summary   1/11/2018    Joyce Murry    MRN: [id number]           Patient Information     Date Of Birth          1975        Visit Information        Provider Department      1/11/2018 11:25 AM Odalis Pearson MD [facility]        Today's Diagnoses     Strain of lumbar region, initial encounter    -  1    Spasm of back muscles          Care Instructions    Primary Care Center: [phone]     Primary Care Center Medication Refill Request Information:  * Please contact your pharmacy regarding ANY request for medication refills.  ** [facility] Prescription Fax = [phone]  * Please allow 3 business days for routine medication refills.  * Please allow 5 business days for controlled substance medication refills.     Primary Care Center Test Result notification information:  *You will be notified with in 7-10 days of your appointment day regarding the results of your test.  If you are on MyChart you will be notified as soon as the provider has reviewed the results and signed off on them.      Back Sprain or Strain    Injury to the muscles (strain) or ligaments (sprain) around the spine can be troubling. Injury may occur after a sudden forceful twisting or bending force such as in a car accident, after a simple awkward movement, or after lifting something heavy with poor body positioning. In any case, muscle spasm is often present and adds to the pain.  Thankfully, most people feel better in 1 to 2 weeks, and most of the rest in 1 to 2 months. Most people can remain active. Unless you had a forceful or traumatic physical injury such as a car accident or fall, X-rays may not be ordered for the first evaluation of a back sprain or strain. If pain continues and does not respond to medical treatment, your healthcare provider may then order X-rays and other tests.  Home care  The following guidelines will help you care for your injury at home:    When in bed, try to find a  comfortable position. A firm mattress is best. Try lying flat on your back with pillows under your knees. You can also try lying on your side with your knees bent up toward your chest and a pillow between your knees.    Don't sit for long periods. Try not to take long car rides or take other trips that have you sitting for a long time. This puts more stress on the lower back than standing or walking.    During the first 24 to 72 hours after an injury or flare-up, apply an ice pack to the painful area for 20 minutes. Then remove it for 20 minutes. Do this for 60 to 90 minutes, or several times a day. This will reduce swelling and pain. Be sure to wrap the ice pack in a thin towel or plastic to protect your skin.    You can start with ice, then switch to heat. Heat from a hot shower, hot bath, or heating pad reduces pain and works well for muscle spasms. Put heat on the painful area for 20 minutes, then remove for 20 minutes. Do this for 60 to 90 minutes, or several times a day. Do not use a heating pad while sleeping. It can burn the skin.    You can alternate the ice and heat. Talk with your healthcare provider to find out the best treatment or therapy for your back pain.    Therapeutic massage will help relax the back muscles without stretching them.    Be aware of safe lifting methods. Do not lift anything over 15 pounds until all of the pain is gone.  Medicines  Talk to your healthcare provider before using medicines, especially if you have other health problems or are taking other medicines.    You may use acetaminophen or ibuprofen to control pain, unless another pain medicine was prescribed. If you have chronic conditions like diabetes, liver or kidney disease, stomach ulcers, or gastrointestinal bleeding, or are taking blood-thinner medicines, talk with your doctor before taking any medicines.    Be careful if you are given prescription medicines, narcotics, or medicine for muscle spasm. They can cause  drowsiness, and affect your coordination, reflexes, and judgment. Do not drive or operate heavy machinery when taking these types of medicines. Only take pain medicine as prescribed by your healthcare provider.  Follow-up care  Follow up with your healthcare provider, or as advised. You may need physical therapy or more tests if your symptoms get worse.  If you had X-rays your healthcare provider may be checking for any broken bones, breaks, or fractures. Bruises and sprains can sometimes hurt as much as a fracture. These injuries can take time to heal completely. If your symptoms don t improve or they get worse, talk with your healthcare provider. You may need a repeat X-ray or other tests.  Call 911  Call for emergency care if any of the following occur:    Trouble breathing    Confused    Very drowsy or trouble awakening    Fainting or loss of consciousness    Rapid or very slow heart rate    Loss of bowel or bladder control  When to seek medical advice  Call your healthcare provider right away if any of the following occur:    Pain gets worse or spreads to your arms or legs    Weakness or numbness in one or both arms or legs    Numbness in the groin or genital area  Date Last Reviewed: 6/1/2016            Follow-ups after your visit        Additional Services     PHYSICAL THERAPY REFERRAL       *This therapy referral will be filtered to a centralized scheduling office at [facility] and the patient will receive a call to schedule an appointment at a [facility] location most convenient for them. *     [facility] provides Physical Therapy evaluation and treatment and many specialty services across the [facility] system.  If requesting a specialty program, please choose from the list below.    If you have not heard from the scheduling office within 2 business days, please call [phone] for all locations, with the exception of [place], please call [phone].  Treatment:  "Evaluation & Treatment  Special Instructions/Modalities:   Special Programs: None    Please be aware that coverage of these services is subject to the terms and limitations of your health insurance plan.  Call member services at your health plan with any benefit or coverage questions.      **Note to Provider:  If you are referring outside of [place] for the therapy appointment, please list the name of the location in the \"special instructions\" above, print the referral and give to the patient to schedule the appointment.                  Follow-up notes from your care team     Return if symptoms worsen or fail to improve.      Your next 10 appointments already scheduled     Jan 25, 2018  9:00 AM CST   Return Visit with [facility] ([facility])    [address] [place] [address] [address]  [facility] [id number] [phone]            Feb 15, 2018  9:00 AM CST   Return Visit with [facility] ([facility])    [address] [place] [address] [address]  [facility] [id number] [phone]              Who to contact     Please call your clinic at [phone] to:    Ask questions about your health    Make or cancel appointments    Discuss your medicines    Learn about your test results    Speak to your doctor   If you have compliments or concerns about an experience at your clinic, or if you wish to file a complaint, please contact [facility] Patient Relations at [phone] or email us at [email or web address]         Additional Information About Your Visit        MyChart Information     [employer] gives you secure access to your electronic health record. If you see a primary care provider, you can also send messages to your care team and make appointments. If you have questions, please call your primary care clinic.  If you do not have a primary care provider, please call [phone] and they will assist you.      iT[employer] is an " electronic gateway that provides easy, online access to your medical records. With [employer], you can request a clinic appointment, read your test results, renew a prescription or communicate with your care team.     To access your existing account, please contact your [facility] or call [phone] for assistance.        Care EveryWhere ID     This is your Care EveryWhere ID. This could be used by other organizations to access your [place] medical records  [id number]        Your Vitals Were     Pulse Respirations Pulse Oximetry BMI (Body Mass Index)          55 20 96% 20.45 kg/m2         Blood Pressure from Last 3 Encounters:   01/11/18 113/78   12/19/17 96/73   03/06/17 103/71    Weight from Last 3 Encounters:   01/11/18 58.3 kg (128 lb 9.6 oz)   12/19/17 58.1 kg (128 lb)   03/06/17 58.3 kg (128 lb 9.6 oz)              We Performed the Following     PHYSICAL THERAPY REFERRAL          Today's Medication Changes          These changes are accurate as of: 1/11/18 12:21 PM.  If you have any questions, ask your nurse or doctor.               Start taking these medicines.        Dose/Directions    cyclobenzaprine 5 MG tablet   Commonly known as:  FLEXERIL   Used for:  Spasm of back muscles, Strain of lumbar region, initial encounter   Started by:  Odalis Pearson MD        Dose:  5-10 mg   Take 1-2 tablets (5-10 mg) by mouth 2 times daily as needed for muscle spasms (dont drive after taking,may cause drowsiness)   Quantity:  20 tablet   Refills:  0            Where to get your medicines      These medications were sent to [facility] [address] [place], [place] - [address] AT [facility] [address], [facility] [address]     Phone:  [phone]     cyclobenzaprine 5 MG tablet                Primary Care Provider Office Phone # Fax #    Cherie Rose [phone] [address] [address]        Equal  Access to Services     [facility]: Hadii aad ku hadmicheallory Stephanyali, wamamtada luqadaha, qaybta kasheabhishek paz. So [facility] [phone].    ATENCIÓN: Si habla español, tiene a uribe disposición servicios gratuitos de asistencia lingüística. Llame al [phone].    We comply with applicable federal civil rights laws and [place] laws. We do not discriminate on the basis of race, color, national origin, age, disability, sex, sexual orientation, or gender identity.            Thank you!     Thank you for choosing [facility]  for your care. Our goal is always to provide you with excellent care. Hearing back from our patients is one way we can continue to improve our services. Please take a few minutes to complete the written survey that you may receive in the mail after your visit with us. Thank you!             Your Updated Medication List - Protect others around you: Learn how to safely use, store and throw away your medicines at www.disposemymeds.org.          This list is accurate as of: 1/11/18 12:21 PM.  Always use your most recent med list.                   Brand Name Dispense Instructions for use Diagnosis    acyclovir 200 MG capsule    ZOVIRAX     Take 2 capsules by mouth every 8 hours        ALPRAZolam 0.25 MG tablet    XANAX    10 tablet    Take 1 tablet before flying in an airplane; repeat as needed and 30 minutes.    Anxiety attack       cyclobenzaprine 5 MG tablet    FLEXERIL    20 tablet    Take 1-2 tablets (5-10 mg) by mouth 2 times daily as needed for muscle spasms (dont drive after taking,may cause drowsiness)    Spasm of back muscles, Strain of lumbar region, initial encounter

## 2018-01-11 NOTE — NURSING NOTE
"Chief Complaint   Patient presents with     Pain     back pain in the \"last 2 weeks\"   Elvie De Jesus LPN 11:53 AM on 1/11/2018  Rooming Note  Health Maintenance   There are no preventive care reminders to display for this patient. All health maintenance items discussed and pended.  Patient declined flu shot.Elvie De Jesus LPN 11:54 AM on 1/11/2018  "

## 2018-01-11 NOTE — PROGRESS NOTES
[facility]  Primary Care Center   Odalis Pearson MD  1/11/2018     Chief Complaint:   Back pain        History of Present Illness:   Joyce Murry is a 42 year old female who presents for evaluation of back pain.  The patient reports low back pain on the right side that first started about 2 weeks ago while on vacation.  She had no specific injury but was carrying a heavy bag and her 6 year old daughter on her left side.  Upon getting back from her trip, she did a weightlifting class which aggravated her pain.  Her pain has been constant since onset and she describes fluctuating pain from a dull ache which limits her mobility to more intense sharp stabbing pain.  When her pain was at its worst, she did have some radiation up to her shoulder and slightly into her buttock.  She denies any radiation of pain into her legs, weakness, or numbness.  She has noticed she does have some urgency with urination which she attributes to age.  She has never lost control of her bladder or bowel.  She does feel somewhat constipated even though she does have regular bowel movements.  She denies any fevers or sweats.  She has had some similar sharp, shooting back pain after the weightlifting class in the past.  This class involves working on all major muscle groups using 30 - 40 pounds of weight.  She believes her lifting form is fairly good.  She has been taking Ibuprofen with some relief and is taking a break from the gym.  Her pain has actually improved in the past couple days but is still bothersome.  A friend of hers who is a physical therapist mentioned PT may be helpful.       Review of Systems:   Pertinent items are noted in HPI.     ROS: 10 point ROS neg other than the symptoms noted above in the HPI.      Active Medications:      acyclovir (ZOVIRAX) 200 MG capsule, Take 2 capsules by mouth every 8 hours, Disp: , Rfl:      ALPRAZolam (XANAX) 0.25 MG tablet, Take 1 tablet before flying in an airplane; repeat as needed and  30 minutes., Disp: 10 tablet, Rfl: 0      Allergies:   Augmentin - rash      Past Medical History:  Genital herpes  Hyperlipidemia      Past Surgical History:  Colonoscopy 12/19/17  Dilation and curettage  2010  [place] teeth removal    Family History:   Pulmonary hypertension - father  Coronary artery disease - father  Diabetes - mother  Breast cancer - mother, paternal aunt  Lung cancer - maternal grandmother     Social History:   Marital Status:   Tobacco Use: Former smoker, quit 2002  Alcohol Use: Rare alcohol use  PCP: Cherie Rose      Physical Exam:   /78 (BP Location: Right arm, Patient Position: Sitting, Cuff Size: Adult Regular)  Pulse 55  Resp 20  Wt 58.3 kg (128 lb 9.6 oz)  SpO2 96%  BMI 20.45 kg/m2     Constitutional: Alert, oriented, pleasant, no acute distress  Head: Normocephalic, atraumatic  Eyes: Extra-ocular movements intact, pupils equally round and reactive bilaterally, no scleral icterus  Cardiovascular: Regular rate and rhythm, no murmurs, rubs or gallops, peripheral pulses full/symmetric  Respiratory: Good air movement bilaterally, lungs clear, no wheezes/rales/rhonchi  Musculoskeletal: No edema, normal muscle tone, normal gait. Range of motion normal with flexion and extension, side bending, rotation. Tenderness over right lumbar paraspinal muscles. No focal deformity or warmth.  No spinal tenderness.  Straight leg raise test negative bilaterally.  Neurologic: Alert and oriented, cranial nerves 2-12 intact. Able to heel and toe walk. Patellar reflexes intact bilaterally. Sensation intact in bilat LE.  Skin: No rashes/lesions  Psychiatric: normal mentation, affect and mood       Assessment and Plan:    Strain of lumbar region, initial encounter  Spasm of back muscles  Continue conservative management with rest and Ibuprofen, will add heat and muscle relaxer as needed.  Physical therapy referral provided.  Patient can also consider massage.  Advised staying away from  lifting for now, can consider swimming, yoga, walking.  - PHYSICAL THERAPY REFERRAL  - cyclobenzaprine (FLEXERIL) 5 MG tablet  Dispense: 20 tablet; Refill: 0     Follow-up: Return if symptoms worsen or fail to improve.         Scribe Disclosure:   I, Odalis Naidusilva, am serving as a scribe to document services personally performed by Odalis Pearson MD at this visit, based upon the provider's statements to me. All documentation has been reviewed by the aforementioned provider prior to being entered into the official medical record.     Portions of this medical record were completed by a scribe. UPON MY REVIEW AND AUTHENTICATION BY ELECTRONIC SIGNATURE, this confirms (a) I performed the applicable clinical services, and (b) the record is accurate.   Odalis Pearson MD  Internal Medicine

## 2018-01-11 NOTE — PATIENT INSTRUCTIONS
[facility]: [phone]     [facility] Medication Refill Request Information:  * Please contact your pharmacy regarding ANY request for medication refills.  ** [facility] Prescription Fax = [phone]  * Please allow 3 business days for routine medication refills.  * Please allow 5 business days for controlled substance medication refills.     [facility] Test Result notification information:  *You will be notified with in 7-10 days of your appointment day regarding the results of your test.  If you are on MyChart you will be notified as soon as the provider has reviewed the results and signed off on them.      Back Sprain or Strain    Injury to the muscles (strain) or ligaments (sprain) around the spine can be troubling. Injury may occur after a sudden forceful twisting or bending force such as in a car accident, after a simple awkward movement, or after lifting something heavy with poor body positioning. In any case, muscle spasm is often present and adds to the pain.  Thankfully, most people feel better in 1 to 2 weeks, and most of the rest in 1 to 2 months. Most people can remain active. Unless you had a forceful or traumatic physical injury such as a car accident or fall, X-rays may not be ordered for the first evaluation of a back sprain or strain. If pain continues and does not respond to medical treatment, your healthcare provider may then order X-rays and other tests.  Home care  The following guidelines will help you care for your injury at home:    When in bed, try to find a comfortable position. A firm mattress is best. Try lying flat on your back with pillows under your knees. You can also try lying on your side with your knees bent up toward your chest and a pillow between your knees.    Don't sit for long periods. Try not to take long car rides or take other trips that have you sitting for a long time. This puts more stress on the lower back than standing or walking.    During  the first 24 to 72 hours after an injury or flare-up, apply an ice pack to the painful area for 20 minutes. Then remove it for 20 minutes. Do this for 60 to 90 minutes, or several times a day. This will reduce swelling and pain. Be sure to wrap the ice pack in a thin towel or plastic to protect your skin.    You can start with ice, then switch to heat. Heat from a hot shower, hot bath, or heating pad reduces pain and works well for muscle spasms. Put heat on the painful area for 20 minutes, then remove for 20 minutes. Do this for 60 to 90 minutes, or several times a day. Do not use a heating pad while sleeping. It can burn the skin.    You can alternate the ice and heat. Talk with your healthcare provider to find out the best treatment or therapy for your back pain.    Therapeutic massage will help relax the back muscles without stretching them.    Be aware of safe lifting methods. Do not lift anything over 15 pounds until all of the pain is gone.  Medicines  Talk to your healthcare provider before using medicines, especially if you have other health problems or are taking other medicines.    You may use acetaminophen or ibuprofen to control pain, unless another pain medicine was prescribed. If you have chronic conditions like diabetes, liver or kidney disease, stomach ulcers, or gastrointestinal bleeding, or are taking blood-thinner medicines, talk with your doctor before taking any medicines.    Be careful if you are given prescription medicines, narcotics, or medicine for muscle spasm. They can cause drowsiness, and affect your coordination, reflexes, and judgment. Do not drive or operate heavy machinery when taking these types of medicines. Only take pain medicine as prescribed by your healthcare provider.  Follow-up care  Follow up with your healthcare provider, or as advised. You may need physical therapy or more tests if your symptoms get worse.  If you had X-rays your healthcare provider may be checking for  any broken bones, breaks, or fractures. Bruises and sprains can sometimes hurt as much as a fracture. These injuries can take time to heal completely. If your symptoms don t improve or they get worse, talk with your healthcare provider. You may need a repeat X-ray or other tests.  Call 911  Call for emergency care if any of the following occur:    Trouble breathing    Confused    Very drowsy or trouble awakening    Fainting or loss of consciousness    Rapid or very slow heart rate    Loss of bowel or bladder control  When to seek medical advice  Call your healthcare provider right away if any of the following occur:    Pain gets worse or spreads to your arms or legs    Weakness or numbness in one or both arms or legs    Numbness in the groin or genital area  Date Last Reviewed: 6/1/2016

## 2018-01-18 ENCOUNTER — THERAPY VISIT (OUTPATIENT)
Dept: PHYSICAL THERAPY | Facility: CLINIC | Age: 43
End: 2018-01-18
Payer: COMMERCIAL

## 2018-01-18 DIAGNOSIS — M54.50 LUMBAGO: Primary | ICD-10-CM

## 2018-01-18 PROCEDURE — 97112 NEUROMUSCULAR REEDUCATION: CPT | Mod: GP | Performed by: PHYSICAL THERAPIST

## 2018-01-18 PROCEDURE — 97161 PT EVAL LOW COMPLEX 20 MIN: CPT | Mod: GP | Performed by: PHYSICAL THERAPIST

## 2018-01-18 PROCEDURE — 97110 THERAPEUTIC EXERCISES: CPT | Mod: GP | Performed by: PHYSICAL THERAPIST

## 2018-01-18 NOTE — PROGRESS NOTES
[facility] Initial Evaluation  Subjective:  Patient is a 42 year old female presenting with rehab back hpi.   Joyce Murry is a 42 year old female with a lumbar condition.  Condition occurred with:  Lifting.  Condition occurred: other (on vacation).  This is a new condition  Pt complains of R LBP onset x 3 weeks while vacationing and having to carry 6 year old child and luggage.  Reports she was not working out as much while on vacation and since onset of pain, has not been able to participate in kickboxing and lifting classes so she is frustrated.  Denies any N/T down the leg.  Pain worse with sitting and bending..    Patient reports pain:  Lumbar spine right.  Radiates to:  No radiation and gluteals right.  Pain is described as sharp and aching (tight and uncomfortable) and is constant (worsened with activity/movement) and reported as 3/10.   Pain is the same all the time.  Symptoms are exacerbated by sitting, bending and lifting and relieved by NSAID's (walking).  Since onset symptoms are gradually improving.        General health as reported by patient is excellent.                                              Objective:        Flexibility/Screens:   Positive screens:  Lumbar                   Lumbar/SI Evaluation    Lumbar Myotomes:  normal            Lumbar DTR's:  normal      Cord Signs:  normal          Functional Tests:  Core strength and proprioception lumbar: poor TrA activation, tendency to Valsalva.                                                           Jar[facility] Lumbar Evaluation    Posture:  Sitting: fair  Standing: fair  Lordosis: WNL  Lateral Shift: no  Correction of Posture: better    Movement Loss:  Flexion (Flex): mod and pain  Extension (EXT): min  Side Glide R (SG R): pain and mod  Side Glide L (SG L): min  Test Movements:      YASMIN: During: produces  After: no worse  Mechanical Response: no effect  Repeat YASMIN: During: produces  After: no worse  Mechanical Response: no  effect  EIL: During: decreases  After: better  Mechanical Response: IncROM  Repeat EIL: During: decreases  After: better  Mechanical Response: IncROM        Conclusion: derangement  Principle of Treatment:  Posture Correction: education    Extension: responded to extension    Other: core stab program                                       ROS    Assessment/Plan:    Patient is a 42 year old female with lumbar complaints.    Patient has the following significant findings with corresponding treatment plan.                Diagnosis 1:  LBP  Pain -  manual therapy, self management, education, directional preference exercise and home program  Decreased ROM/flexibility - manual therapy, therapeutic exercise, therapeutic activity and home program  Decreased function - therapeutic activities and home program  Impaired posture - neuro re-education, therapeutic activities and home program    Therapy Evaluation Codes:   1) History comprised of:   Personal factors that impact the plan of care:      None.    Comorbidity factors that impact the plan of care are:      None.     Medications impacting care: None.  2) Examination of Body Systems comprised of:   Body structures and functions that impact the plan of care:      Lumbar spine.   Activity limitations that impact the plan of care are:      Bending and Lifting.  3) Clinical presentation characteristics are:   Stable/Uncomplicated.  4) Decision-Making    Low complexity using standardized patient assessment instrument and/or measureable assessment of functional outcome.  Cumulative Therapy Evaluation is: Low complexity.    Previous and current functional limitations:  (See Goal Flow Sheet for this information)    Short term and Long term goals: (See Goal Flow Sheet for this information)     Communication ability:  Patient appears to be able to clearly communicate and understand verbal and written communication and follow directions correctly.  Treatment Explanation - The  following has been discussed with the patient:   RX ordered/plan of care  Anticipated outcomes  Possible risks and side effects  This patient would benefit from PT intervention to resume normal activities.   Rehab potential is excellent.    Frequency:  1 X week, once daily  Duration:  for 4 weeks  Discharge Plan:  Achieve all LTG.  Independent in home treatment program.  Reach maximal therapeutic benefit.    Please refer to the daily flowsheet for treatment today, total treatment time and time spent performing 1:1 timed codes.

## 2018-01-19 NOTE — PROGRESS NOTES
[facility] Initial Evaluation  Subjective:  Patient is a 42 year old female presenting with rehab left ankle/foot hpi.                                      Pertinent medical history includes:  Diabetes, migraines and smoking.  Medical allergies: yes.  Other surgeries include:  Other.    Current occupation is Self employed .  Patient is working in normal job without restrictions.  Primary job tasks include:  Lifting and driving.    Barriers include:  None as reported by patient.    Red flags:  None as reported by patient.      Oswestry Score: 12.5 %                 Objective:  System    Physical Exam    General     ROS    Assessment/Plan:

## 2018-01-25 ENCOUNTER — OFFICE VISIT (OUTPATIENT)
Dept: PSYCHOLOGY | Facility: CLINIC | Age: 43
End: 2018-01-25
Payer: COMMERCIAL

## 2018-01-25 DIAGNOSIS — F41.1 GAD (GENERALIZED ANXIETY DISORDER): Primary | ICD-10-CM

## 2018-01-25 PROCEDURE — 90847 FAMILY PSYTX W/PT 50 MIN: CPT | Performed by: MARRIAGE & FAMILY THERAPIST

## 2018-01-25 NOTE — MR AVS SNAPSHOT
MRN:[id number]                      After Visit Summary   1/25/2018    Joyce Murry    MRN: [id number]           Visit Information        Provider Department      1/25/2018 9:00 AM [facility] Generic      Your next 10 appointments already scheduled     Feb 01, 2018  9:40 AM CST   [facility] Spine with Nils Tomlinson, PT   [facility] - [place] [facility] ([facility] [place]  )    [address]  [place] [address]   [phone]            Feb 08, 2018  9:00 AM CST   Return Visit with [facility] [place] ([facility] [place])    [address]  [place] [address]   [phone]            Feb 22, 2018  9:00 AM CST   Return Visit with [facility] [place] ([facility] [place])    [address]  [place] [address]   [phone]            Mar 01, 2018 10:00 AM CST   Return Visit with [facility] [place] ([facility] [place])    [address]  [place] [address]   [phone]            Mar 08, 2018  9:00 AM CST   Return Visit with [facility] [place] ([facility] [place])    [address]  [place] [address]   [phone]              MyChart Information     [facility] [employer] gives you secure access to your electronic health record. If you see a primary care provider, you can also send messages to your care team and make appointments. If you have questions, please call your primary care clinic.  If you do not have a primary care provider, please call [phone] and they will assist you.        Care EveryWhere ID     This is your Care EveryWhere ID. This could be used by other organizations to access your [facility] medical records  [id number]        Equal Access to Services     ANNIE KRISHNAMURTHY AH: selma Aguero, abhishek brian  yudith talbotaaraeann ah. So [facility] [phone].    ATENCIÓN: Si habla español, tiene a uribe disposición servicios gratuitos de asistencia lingüística. Llame al [phone].    We comply with applicable federal civil rights laws and [place] laws. We do not discriminate on the basis of race, color, national origin, age, disability, sex, sexual orientation, or gender identity.

## 2018-01-25 NOTE — PROGRESS NOTES
Progress Note    Client Name: Joyce Murry  Date: 1/25/2018         Service Type: Individual      Session Start Time: 9:30  Session End Time: 10:15      Session Length: 45     Session #: Return     Attendees: Client and Spouse / Significant Other    Treatment Plan Last Reviewed: 11/17/2017  PHQ-9 / GABRIEL-7 : Each session     DATA      Progress Since Last Session (Related to Symptoms / Goals / Homework):   Symptoms: Stable    Homework: Partially completed      Episode of Care Goals: Satisfactory progress - ACTION (Actively working towards change); Intervened by reinforcing change plan / affirming steps taken     Current / Ongoing Stressors and Concerns:  Joyce and Jer feel like they are right back to square one. They have stopped using resources/tools that we've discussed and are bickering and on each other's nerves. Joyce is back to thinking about separation or divorce. Says when Jer travels, the house is less stressful and she likes it. Jer acknowledges that he is stressed a lot and bringing it home. His job is crazy stressful and he needs to do something about it. Is a  at [employer] working a high profile account. He doesn't have a supportive enough team. He wants to pursue individual therapy to work on this. Couple did not follow through on reading 1-2-3 magic and their daughter continues to be a problem. They are here wanting a reset to get back on track and also want to come more often to counseling.     Treatment Objective(s) Addressed in This Session:   Identify goals for couples work.  Improve communication by Joyce validating Jer more often.  Couple to devise list of consequences for daughter and begin implementing them balanced with praise.  Create list of priorities including 1,2,3 magic implementing, have family meeting, watch Joyce Hamilton to do punchlist at home and start house projects room by room,couple to go out together every other  week and do an activity or spend time with friends     Intervention:   Relationship counseling   Strategies to lower anxiety.   S/L technique regularly to ground their communication   Resist communicating when dysregulated.     ASSESSMENT: Current Emotional / Mental Status (status of significant symptoms):   Risk status (Self / Other harm or suicidal ideation)   Client denies current fears or concerns for personal safety.   Client denies current or recent suicidal ideation or behaviors.   Client denies current or recent homicidal ideation or behaviors.   Client denies current or recent self injurious behavior or ideation.   Client denies other safety concerns.   A safety and risk management plan has not been developed at this time, however client was given the after-hours number / 911 should there be a change in any of these risk factors.     Appearance:   Appropriate    Eye Contact:   Good    Psychomotor Behavior: Normal    Attitude:   Cooperative    Orientation:   All   Speech    Rate / Production: Normal     Volume:  Normal    Mood:    Normal   Affect:    Appropriate    Thought Content:  Clear    Thought Form:  Coherent  Logical    Insight:    Good      Medication Review:   No current psychiatric medications prescribed      Medication Compliance:   NA     Changes in Health Issues:   None reported     Chemical Use Review:   Substance Use: Chemical use reviewed, no active concerns identified      Tobacco Use: No current tobacco use.       Collateral Reports Completed:   Not Applicable    PLAN: (Client Tasks / Therapist Tasks / Other)  Recommit to: Use S/L technique, less managing of one another's feelings in communicating, more concise talking, rating their mulligans, talk before session about what they want to work on.   Joyce to be the activator at home. Also to make requests of Jer versus talking off the top of her head.  Consequences for bad behavior for the youngest, 1,2,3 Magic, consistency as parents,  family meeting, Seth Chun.  Schedule date night every other week. Jer to investigate therapists.    Yareli Walker                                                         ________________________________________________________________________                                                 Treatment Plan    Client's Name: Joyce Murry  YOB: 1975    Date: 1/25/2018    DSM-V Diagnoses: 300.02 - Generalized Anxiety Disorder By History; V71.09 - No Diagnosis  Psychosocial / Contextual Factors: marital discord  WHODAS: 14    Referral / Collaboration:  Referral to another professional/service is not indicated at this time..    Anticipated number of session or this episode of care: 10      MeasurableTreatment Goal(s) related to diagnosis / functional impairment(s)  Goal 1: Client will lower GAD7 score to 3 or below.    I will know I've met my goal when I'm less anxious in general.      Objective #A (Client Action)    Client will participate in couples counseling to address communication breakdowns.  Status: Continued - Date(s):1/25/2018    Intervention(s)  Therapist will work with couple on communication..    Objective #B  Client will look to pursue work outside the home..  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    Objective #C  Client will use CBT to address anxious thinking..  Status: Continued - Date(s):1/25/2018    Intervention(s)  Therapist will teach CBT.      Client has reviewed and agreed to the above plan.      Yareli Walker  January 25, 2018

## 2018-01-26 ASSESSMENT — PATIENT HEALTH QUESTIONNAIRE - PHQ9: SUM OF ALL RESPONSES TO PHQ QUESTIONS 1-9: 6

## 2018-02-01 ENCOUNTER — THERAPY VISIT (OUTPATIENT)
Dept: PHYSICAL THERAPY | Facility: CLINIC | Age: 43
End: 2018-02-01
Payer: COMMERCIAL

## 2018-02-01 DIAGNOSIS — M54.50 LUMBAGO: ICD-10-CM

## 2018-02-01 PROCEDURE — 97110 THERAPEUTIC EXERCISES: CPT | Mod: GP | Performed by: PHYSICAL THERAPIST

## 2018-02-01 PROCEDURE — 97530 THERAPEUTIC ACTIVITIES: CPT | Mod: GP | Performed by: PHYSICAL THERAPIST

## 2018-02-08 ENCOUNTER — OFFICE VISIT (OUTPATIENT)
Dept: PSYCHOLOGY | Facility: CLINIC | Age: 43
End: 2018-02-08
Payer: COMMERCIAL

## 2018-02-08 DIAGNOSIS — F41.1 GAD (GENERALIZED ANXIETY DISORDER): Primary | ICD-10-CM

## 2018-02-08 PROCEDURE — 90847 FAMILY PSYTX W/PT 50 MIN: CPT | Performed by: MARRIAGE & FAMILY THERAPIST

## 2018-02-08 ASSESSMENT — ANXIETY QUESTIONNAIRES
3. WORRYING TOO MUCH ABOUT DIFFERENT THINGS: MORE THAN HALF THE DAYS
GAD7 TOTAL SCORE: 8
1. FEELING NERVOUS, ANXIOUS, OR ON EDGE: SEVERAL DAYS
5. BEING SO RESTLESS THAT IT IS HARD TO SIT STILL: NOT AT ALL
7. FEELING AFRAID AS IF SOMETHING AWFUL MIGHT HAPPEN: MORE THAN HALF THE DAYS
2. NOT BEING ABLE TO STOP OR CONTROL WORRYING: SEVERAL DAYS
6. BECOMING EASILY ANNOYED OR IRRITABLE: SEVERAL DAYS
IF YOU CHECKED OFF ANY PROBLEMS ON THIS QUESTIONNAIRE, HOW DIFFICULT HAVE THESE PROBLEMS MADE IT FOR YOU TO DO YOUR WORK, TAKE CARE OF THINGS AT HOME, OR GET ALONG WITH OTHER PEOPLE: SOMEWHAT DIFFICULT

## 2018-02-08 ASSESSMENT — PATIENT HEALTH QUESTIONNAIRE - PHQ9: 5. POOR APPETITE OR OVEREATING: SEVERAL DAYS

## 2018-02-08 NOTE — PROGRESS NOTES
"                                             Progress Note    Client Name: Joyce Murry  Date: 2/8/2018         Service Type: Individual      Session Start Time: 9:30  Session End Time: 10:15      Session Length: 45     Session #: Return     Attendees: Client and Spouse / Significant Other    Treatment Plan Last Reviewed: 2/8/2018  PHQ-9 / GABRIEL-7 : Each session     DATA      Progress Since Last Session (Related to Symptoms / Goals / Homework):   Symptoms: Stable    Homework: Partially completed      Episode of Care Goals: Satisfactory progress - ACTION (Actively working towards change); Intervened by reinforcing change plan / affirming steps taken     Current / Ongoing Stressors and Concerns:  Joyce and Jer want to meet weekly to get back on track. 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 This has been very positive. Talked about Joyce wanting Jer to \"gift\" her more often and more intentionally. Joyce to create a Wish list on [employer]. Also discussed how often Joyce feels criticized by Jer. It seems that she often misinterprets Jer trying to share his perspective with her versus telling her she's wrong. He likes things a certain way (more orderly) and Joyce is a multi-avani which can increase Jer's stress.      Treatment Objective(s) Addressed in This Session:   Identify goals for couples work.  Improve communication by Joyce validating Jer more often.  Couple to devise list of consequences for daughter and begin implementing them balanced with praise.  Create list of priorities including 1,2,3 magic implementing, have family meeting, watch Alfonso   Jocye to do punchlist at home and start house projects room by room  Couple to go out together every other week and do an activity or spend time with friends     Intervention:   Relationship counseling   Strategies to lower anxiety.   S/L technique regularly to ground their communication   Resist communicating when " dysregulated.     ASSESSMENT: Current Emotional / Mental Status (status of significant symptoms):   Risk status (Self / Other harm or suicidal ideation)   Client denies current fears or concerns for personal safety.   Client denies current or recent suicidal ideation or behaviors.   Client denies current or recent homicidal ideation or behaviors.   Client denies current or recent self injurious behavior or ideation.   Client denies other safety concerns.   A safety and risk management plan has not been developed at this time, however client was given the after-hours number / 911 should there be a change in any of these risk factors.     Appearance:   Appropriate    Eye Contact:   Good    Psychomotor Behavior: Normal    Attitude:   Cooperative    Orientation:   All   Speech    Rate / Production: Normal     Volume:  Normal    Mood:    Normal   Affect:    Appropriate    Thought Content:  Clear    Thought Form:  Coherent  Logical    Insight:    Good      Medication Review:   No current psychiatric medications prescribed      Medication Compliance:   NA     Changes in Health Issues:   None reported     Chemical Use Review:   Substance Use: Chemical use reviewed, no active concerns identified      Tobacco Use: No current tobacco use.       Collateral Reports Completed:   Not Applicable    PLAN: (Client Tasks / Therapist Tasks / Other)  Recommit to: Use S/L technique, less managing of one another's feelings in communicating, more concise talking, rating their mulligans, talk before session about what they want to work on.   Joyce to be the activator at home. Also to make requests of Jer versus talking off the top of her head.  Consequences for bad behavior for the youngest, 1,2,3 Magic, consistency as parents, family meeting, Super Nanny.  Schedule date night every other week. Jer to investigate therapists.   next time with Jer criticizing the children and how Joyce perceives this.    Yareli Walker                                                          ________________________________________________________________________                                                 Treatment Plan    Client's Name: Joyce Murry  YOB: 1975    Date: 1/25/2018    DSM-V Diagnoses: 300.02 - Generalized Anxiety Disorder By History; V71.09 - No Diagnosis  Psychosocial / Contextual Factors: marital discord  WHODAS: 14    Referral / Collaboration:  Referral to another professional/service is not indicated at this time..    Anticipated number of session or this episode of care: 10      MeasurableTreatment Goal(s) related to diagnosis / functional impairment(s)  Goal 1: Client will lower GAD7 score to 3 or below.    I will know I've met my goal when I'm less anxious in general.      Objective #A (Client Action)    Client will participate in couples counseling to address communication breakdowns.  Status: Continued - Date(s):1/25/2018    Intervention(s)  Therapist will work with couple on communication..    Objective #B  Client will look to pursue work outside the home..  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    Objective #C  Client will use CBT to address anxious thinking..  Status: Continued - Date(s):1/25/2018    Intervention(s)  Therapist will teach CBT.      Client has reviewed and agreed to the above plan.      Yareli Walker  January 25, 2018

## 2018-02-08 NOTE — MR AVS SNAPSHOT
MRN:[id number]                      After Visit Summary   2/8/2018    Joyce Murry    MRN: [id number]           Visit Information        Provider Department      2/8/2018 9:00 AM [facility] Generic      Your next 10 appointments already scheduled     Feb 13, 2018  9:00 AM CST   [facility] Spine with Nils Tomlinson PT   [facility] - [place] [facility] ([facility] [place]  )    [address]  [place] [address]   [phone]            Feb 22, 2018  9:00 AM CST   [facility] [place] ([facility] [place])    [address]  [place] [address] [phone]            Mar 01, 2018 10:00 AM CST   [facility] [place] ([facility] [place])    [address]  [place] [address] [phone]            Mar 08, 2018  9:00 AM CST   Return Visit with Yareli Alicia [facility] [place] ([facility] [place])    [address]  [place] [address] [phone]              MyChart Information     [facility] gives you secure access to your electronic health record. If you see a primary care provider, you can also send messages to your care team and make appointments. If you have questions, please call your primary care clinic.  If you do not have a primary care provider, please call [phone] and they will assist you.        Care EveryWhere ID     This is your Care EveryWhere ID. This could be used by other organizations to access your [facility] medical records  [id number]        Equal Access to Services     ANNIE KRISHNAMURTHY AH: Hadii dulce maria Fleming, wamamtada luqadaha, qaybta kaalmada akanksha, abhishek khan. So [facility] [phone].    ATENCIÓN: Si habla español, tiene a uribe disposición servicios gratuitos de asistencia lingüística. Llame al [phone].    We  comply with applicable federal civil rights laws and [place] laws. We do not discriminate on the basis of race, color, national origin, age, disability, sex, sexual orientation, or gender identity.

## 2018-02-09 ASSESSMENT — ANXIETY QUESTIONNAIRES: GAD7 TOTAL SCORE: 8

## 2018-02-09 ASSESSMENT — PATIENT HEALTH QUESTIONNAIRE - PHQ9: SUM OF ALL RESPONSES TO PHQ QUESTIONS 1-9: 6

## 2018-02-13 PROBLEM — M54.50 LUMBAGO: Status: RESOLVED | Noted: 2018-01-18 | Resolved: 2018-02-13

## 2018-03-01 ENCOUNTER — OFFICE VISIT (OUTPATIENT)
Dept: PSYCHOLOGY | Facility: CLINIC | Age: 43
End: 2018-03-01
Payer: COMMERCIAL

## 2018-03-01 DIAGNOSIS — F41.1 GAD (GENERALIZED ANXIETY DISORDER): Primary | ICD-10-CM

## 2018-03-01 PROCEDURE — 90847 FAMILY PSYTX W/PT 50 MIN: CPT | Performed by: MARRIAGE & FAMILY THERAPIST

## 2018-03-01 ASSESSMENT — ANXIETY QUESTIONNAIRES
6. BECOMING EASILY ANNOYED OR IRRITABLE: MORE THAN HALF THE DAYS
7. FEELING AFRAID AS IF SOMETHING AWFUL MIGHT HAPPEN: MORE THAN HALF THE DAYS
2. NOT BEING ABLE TO STOP OR CONTROL WORRYING: SEVERAL DAYS
1. FEELING NERVOUS, ANXIOUS, OR ON EDGE: SEVERAL DAYS
5. BEING SO RESTLESS THAT IT IS HARD TO SIT STILL: SEVERAL DAYS
IF YOU CHECKED OFF ANY PROBLEMS ON THIS QUESTIONNAIRE, HOW DIFFICULT HAVE THESE PROBLEMS MADE IT FOR YOU TO DO YOUR WORK, TAKE CARE OF THINGS AT HOME, OR GET ALONG WITH OTHER PEOPLE: SOMEWHAT DIFFICULT
3. WORRYING TOO MUCH ABOUT DIFFERENT THINGS: SEVERAL DAYS
GAD7 TOTAL SCORE: 9

## 2018-03-01 ASSESSMENT — PATIENT HEALTH QUESTIONNAIRE - PHQ9: 5. POOR APPETITE OR OVEREATING: SEVERAL DAYS

## 2018-03-01 NOTE — PROGRESS NOTES
Progress Note    Client Name: Joyce Murry  Date: 3/1/2018         Service Type: Individual      Session Start Time: 9:30  Session End Time: 10:15      Session Length: 45     Session #: Return     Attendees: Client and Spouse / Significant Other    Treatment Plan Last Reviewed: 2/8/2018  PHQ-9 / GABRIEL-7 : Each session     DATA      Progress Since Last Session (Related to Symptoms / Goals / Homework):   Symptoms: Stable    Homework: Partially completed      Episode of Care Goals: Satisfactory progress - ACTION (Actively working towards change); Intervened by reinforcing change plan / affirming steps taken     Current / Ongoing Stressors and Concerns:  Joyce and Joo continue to feel some improvement now that they are coming more often. Want to stay the course. Joyce realizes how her sensitivity is most of the cause of her issues with Joo's parenting. She is working on this. May do EMDR or exposure therapy with her individual therapist. Joo would also like a referral for a therapist. Joyce has taken ownership of projects around the house and also had a realtor come over and give her recommendations. This has helped and empowered Joyce. She is in between jobs so this has been helpful. Couple talk about [employer] being helpful although their oldest has been possibly pushing back. They are committed to being consistent. Talked about finances which joo would like to see a budget. Joyce wants more big picture. Told them about Jaya Vazquez.      Treatment Objective(s) Addressed in This Session:   Identify goals for couples work.  Improve communication by Joyce validating Joo more often.  Couple to devise list of consequences for daughter and begin implementing them balanced with praise.  Create list of priorities including 1,2,3 magic implementing, have family meeting, watch Joyce Hamilton to do punchlist at home and start house projects room by room  Couple to go out  together every other week and do an activity or spend time with friends  Consider dev and Jaya Vazquez.     Intervention:   Relationship counseling   Strategies to lower anxiety.   S/L technique regularly to ground their communication   Resist communicating when dysregulated.     ASSESSMENT: Current Emotional / Mental Status (status of significant symptoms):   Risk status (Self / Other harm or suicidal ideation)   Client denies current fears or concerns for personal safety.   Client denies current or recent suicidal ideation or behaviors.   Client denies current or recent homicidal ideation or behaviors.   Client denies current or recent self injurious behavior or ideation.   Client denies other safety concerns.   A safety and risk management plan has not been developed at this time, however client was given the after-hours number / 911 should there be a change in any of these risk factors.     Appearance:   Appropriate    Eye Contact:   Good    Psychomotor Behavior: Normal    Attitude:   Cooperative    Orientation:   All   Speech    Rate / Production: Normal     Volume:  Normal    Mood:    Normal   Affect:    Appropriate    Thought Content:  Clear    Thought Form:  Coherent  Logical    Insight:    Good      Medication Review:   No current psychiatric medications prescribed      Medication Compliance:   NA     Changes in Health Issues:   None reported     Chemical Use Review:   Substance Use: Chemical use reviewed, no active concerns identified      Tobacco Use: No current tobacco use.       Collateral Reports Completed:   Not Applicable    PLAN: (Client Tasks / Therapist Tasks / Other)  Recommit to: Use S/L technique, less managing of one another's feelings in communicating, more concise talking, rating their mulligans, talk before session about what they want to work on.   Joyce to be the activator at home. Also to make requests of Jer versus talking off the top of her head.  Consequences for bad behavior for the  youngest, 1,2,3 Magic, consistency as parents, family meeting, Super Nanny.  Schedule date night every other week. Jer to investigate therapists. Give him referrals.  Continue with 1,2,3 Magic,   Ask Joyce about her exposure therapy  Also consider a budget. Assigned Jaya Vazquez and bsf.    Yareli Walker                                                         ________________________________________________________________________                                                 Treatment Plan    Client's Name: Joyce Murry  YOB: 1975    Date: 1/25/2018    DSM-V Diagnoses: 300.02 - Generalized Anxiety Disorder By History; V71.09 - No Diagnosis  Psychosocial / Contextual Factors: marital discord  WHODAS: 14    Referral / Collaboration:  Referral to another professional/service is not indicated at this time..    Anticipated number of session or this episode of care: 10      MeasurableTreatment Goal(s) related to diagnosis / functional impairment(s)  Goal 1: Client will lower GAD7 score to 3 or below.    I will know I've met my goal when I'm less anxious in general.      Objective #A (Client Action)    Client will participate in couples counseling to address communication breakdowns.  Status: Continued - Date(s):1/25/2018    Intervention(s)  Therapist will work with couple on communication..    Objective #B  Client will look to pursue work outside the home..  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    Objective #C  Client will participate in individual therapy for her anxiety disorder..  Status: New - Date: 3/1/69785/25/2018    Intervention(s)  Therapist will teach CBT.      Client has reviewed and agreed to the above plan.      Yareli Walkre  January 25, 2018

## 2018-03-01 NOTE — MR AVS SNAPSHOT
MRN:[id number]                      After Visit Summary   3/1/2018    Joyce Murry    MRN: [id number]           Visit Information        Provider Department      [facility]2018 [facility]      Your next 10 appointments already scheduled     Mar 08, 2018  9:00 AM CST   [facility] [place] ([facility] [place])    [address]  [place] [address]   [phone]            Mar 22, 2018  9:00 AM CDT   Return Visit with Yarelijuan Alicia [facility] [place] ([facility] [place])    [address]  [place] [address]   [phone]            Apr 12, 2018  9:00 AM CDT   Return Visit with Yareli Alicia [facility] [place] ([facility] [place])    [address]  [place] [address]   [phone]              MyChart Information     [employer] gives you secure access to your electronic health record. If you see a primary care provider, you can also send messages to your care team and make appointments. If you have questions, please call your primary care clinic.  If you do not have a primary care provider, please call [phone] and they will assist you.        Care EveryWhere ID     This is your Care EveryWhere ID. This could be used by other organizations to access your [place] medical records  [id number]        Equal Access to Services     ANNIE KRISHNAMURTHY AH: Hadii dulce maria junioro Sojorge albertoali, waaxda luqadaha, qaybta kaalmada adeegyada, abhishek haji adealbaro khan. So [facility] [phone].    ATENCIÓN: Si habla español, tiene a uribe disposición servicios gratuitos de asistencia lingüística. Llame al [phone].    We comply with applicable federal civil rights laws and [place] laws. We do not discriminate on the basis of race, color, national origin, age, disability, sex, sexual orientation, or gender identity.

## 2018-03-02 ASSESSMENT — ANXIETY QUESTIONNAIRES: GAD7 TOTAL SCORE: 9

## 2018-03-02 ASSESSMENT — PATIENT HEALTH QUESTIONNAIRE - PHQ9: SUM OF ALL RESPONSES TO PHQ QUESTIONS 1-9: 5

## 2018-03-08 ENCOUNTER — OFFICE VISIT (OUTPATIENT)
Dept: PSYCHOLOGY | Facility: CLINIC | Age: 43
End: 2018-03-08
Payer: COMMERCIAL

## 2018-03-08 DIAGNOSIS — F41.1 GAD (GENERALIZED ANXIETY DISORDER): Primary | ICD-10-CM

## 2018-03-08 PROCEDURE — 90847 FAMILY PSYTX W/PT 50 MIN: CPT | Performed by: MARRIAGE & FAMILY THERAPIST

## 2018-03-08 NOTE — PROGRESS NOTES
Progress Note    Client Name: Joyce Murry  Date: 3/8/2018         Service Type: Individual      Session Start Time: 9:30  Session End Time: 10:15      Session Length: 45     Session #: Return     Attendees: Client and Spouse / Significant Other    Treatment Plan Last Reviewed: 2/8/2018  PHQ-9 / GABRIEL-7 : Each session     DATA      Progress Since Last Session (Related to Symptoms / Goals / Homework):   Symptoms: Stable    Homework: Partially completed      Episode of Care Goals: Satisfactory progress - ACTION (Actively working towards change); Intervened by reinforcing change plan / affirming steps taken     Current / Ongoing Stressors and Concerns:  Joyce and Jer processed a lot of small concerns today including their drivign skills and how each of them handles each other's responses while driving, Jer increasingly feeling crabby and how Joyce needs him to go to the basement if he is potentially trying to pick a fight with their daughter and ID that Jer's achilles heal is feeling unfairly criticized. Joyce is also sensitive to her perceived criticism from Jer. Jer agreed to go downstairs to work, both will avoid making driving comments and be aware of their issues with criticism. Joyce to consider a weekend away so Jer can melo his skills in 1,2,3 magic which he has not implemented as fully as Joyce. She has found it to be making a dramatic change.     Treatment Objective(s) Addressed in This Session:   Identify goals for couples work.  Improve communication by Joyce validating Jer more often.  Couple to devise list of consequences for daughter and begin implementing them balanced with praise.  Create list of priorities including 1,2,3 magic implementing, have family meeting, watch Joyce Hamilton to do punchlist at home and start house projects room by room  Couple to go out together every other week and do an activity or spend time with friends  Consider bsf  and Jaya Vazquez.     Intervention:   Relationship counseling   Strategies to lower anxiety.   S/L technique regularly to ground their communication   Resist communicating when dysregulated.     ASSESSMENT: Current Emotional / Mental Status (status of significant symptoms):   Risk status (Self / Other harm or suicidal ideation)   Client denies current fears or concerns for personal safety.   Client denies current or recent suicidal ideation or behaviors.   Client denies current or recent homicidal ideation or behaviors.   Client denies current or recent self injurious behavior or ideation.   Client denies other safety concerns.   A safety and risk management plan has not been developed at this time, however client was given the after-hours number / 911 should there be a change in any of these risk factors.     Appearance:   Appropriate    Eye Contact:   Good    Psychomotor Behavior: Normal    Attitude:   Cooperative    Orientation:   All   Speech    Rate / Production: Normal     Volume:  Normal    Mood:    Normal   Affect:    Appropriate    Thought Content:  Clear    Thought Form:  Coherent  Logical    Insight:    Good      Medication Review:   No current psychiatric medications prescribed      Medication Compliance:   NA     Changes in Health Issues:   None reported     Chemical Use Review:   Substance Use: Chemical use reviewed, no active concerns identified      Tobacco Use: No current tobacco use.       Collateral Reports Completed:   Not Applicable    PLAN: (Client Tasks / Therapist Tasks / Other)  Recommit to: Use S/L technique, less managing of one another's feelings in communicating, more concise talking, rating their mulligans, talk before session about what they want to work on.   Joyce to be the activator at home. Also to make requests of Jer versus talking off the top of her head.  Consequences for bad behavior for the youngest, 1,2,3 Magic, consistency as parents, family meeting, Super Nanny.  Schedule  date night every other week. Jer to investigate therapists. Give him referrals.  Continue with 1,2,3 Magic, Jer especially  Also consider a budget. Assigned Jaya Vazquez and bsf.  No comments when driving, Jer to work in the basement, Jer to notice how transitions can make him anxious.    Yareli Walker                                                         ________________________________________________________________________                                                 Treatment Plan    Client's Name: Joyce Murry  YOB: 1975    Date: 1/25/2018    DSM-V Diagnoses: 300.02 - Generalized Anxiety Disorder By History; V71.09 - No Diagnosis  Psychosocial / Contextual Factors: marital discord  WHODAS: 14    Referral / Collaboration:  Referral to another professional/service is not indicated at this time..    Anticipated number of session or this episode of care: 10      MeasurableTreatment Goal(s) related to diagnosis / functional impairment(s)  Goal 1: Client will lower GAD7 score to 3 or below.    I will know I've met my goal when I'm less anxious in general.      Objective #A (Client Action)    Client will participate in couples counseling to address communication breakdowns.  Status: Continued - Date(s):1/25/2018    Intervention(s)  Therapist will work with couple on communication..    Objective #B  Client will look to pursue work outside the home..  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    Objective #C  Client will participate in individual therapy for her anxiety disorder..  Status: New - Date: 3/1/18222/25/2018    Intervention(s)  Therapist will teach CBT.      Client has reviewed and agreed to the above plan.      Yareli Walker  January 25, 2018

## 2018-03-08 NOTE — MR AVS SNAPSHOT
MRN:[id number]                      After Visit Summary   3/8/2018    Joyce Murry    MRN: [id number]           Visit Information        Provider Department      3/8/2018 9:00 AM DeniseYareli [facility] Generic      Your next 10 appointments already scheduled     Mar 22, 2018  9:00 AM CDT   Return Visit with Yareli Alicia [facility] [place] ([facility] [place])    [address]  [place] [address] [phone]            Apr 12, 2018  9:00 AM CDT   Return Visit with Yareli Alicia [facility] [place] ([facility] [place])    [address]  [place] [address] [phone]              MyChart Information     [employer] gives you secure access to your electronic health record. If you see a primary care provider, you can also send messages to your care team and make appointments. If you have questions, please call your primary care clinic.  If you do not have a primary care provider, please call [phone] and they will assist you.        Care EveryWhere ID     This is your Care EveryWhere ID. This could be used by other organizations to access your [place] medical records  [id number]        Equal Access to Services     ANNIE KRISHNAMURTHY AH: Renuka junioro Sowendy, waaxda luqadaha, qaybta kaalmada adetea, abhishek khan. So [facility] [phone].    ATENCIÓN: Si habla español, tiene a uribe disposición servicios gratuitos de asistencia lingüística. Llame al [phone].    We comply with applicable federal civil rights laws and [place] laws. We do not discriminate on the basis of race, color, national origin, age, disability, sex, sexual orientation, or gender identity.

## 2018-03-09 ASSESSMENT — PATIENT HEALTH QUESTIONNAIRE - PHQ9: SUM OF ALL RESPONSES TO PHQ QUESTIONS 1-9: 3

## 2018-03-22 ENCOUNTER — OFFICE VISIT (OUTPATIENT)
Dept: PSYCHOLOGY | Facility: CLINIC | Age: 43
End: 2018-03-22
Payer: COMMERCIAL

## 2018-03-22 DIAGNOSIS — F41.1 GAD (GENERALIZED ANXIETY DISORDER): Primary | ICD-10-CM

## 2018-03-22 PROCEDURE — 90847 FAMILY PSYTX W/PT 50 MIN: CPT | Performed by: MARRIAGE & FAMILY THERAPIST

## 2018-03-22 NOTE — PROGRESS NOTES
Progress Note    Client Name: Joyce Murry  Date: 3/22/2018         Service Type: Individual      Session Start Time: 9:30  Session End Time: 10:15      Session Length: 45     Session #: Return     Attendees: Client and Spouse / Significant Other    Treatment Plan Last Reviewed: 2/8/2018  PHQ-9 / GABRIEL-7 : Each session     DATA      Progress Since Last Session (Related to Symptoms / Goals / Homework):   Symptoms: Stable    Homework: Partially completed      Episode of Care Goals: Satisfactory progress - ACTION (Actively working towards change); Intervened by reinforcing change plan / affirming steps taken     Current / Ongoing Stressors and Concerns:  Joyce and Jer did a nice job today discussing a couple of events. Therapist acknowledged how much they've improved on being able to talk through things and hear each other without defensiveness. They felt the same too. Also discussed Joyce needing to make her own money to feel good about spending it and also her struggle with doing things for herself if it means Jer has to have the kids. Agreed that she needs to trust more that it's ok but ask Jer if she needs to.      Treatment Objective(s) Addressed in This Session:   Identify goals for couples work.  Improve communication by Joyce validating Jer more often.  Couple to devise list of consequences for daughter and begin implementing them balanced with praise.  Create list of priorities including 1,2,3 magic implementing, have family meeting, watch Alfonso   Joyce to do punchlist at home and start house projects room by room  Couple to go out together every other week and do an activity or spend time with friends  Consider dev and Jaya Vazquez.     Intervention:   Relationship counseling   Strategies to lower anxiety.   S/L technique regularly to ground their communication   Resist communicating when dysregulated.     ASSESSMENT: Current Emotional / Mental Status  (status of significant symptoms):   Risk status (Self / Other harm or suicidal ideation)   Client denies current fears or concerns for personal safety.   Client denies current or recent suicidal ideation or behaviors.   Client denies current or recent homicidal ideation or behaviors.   Client denies current or recent self injurious behavior or ideation.   Client denies other safety concerns.   A safety and risk management plan has not been developed at this time, however client was given the after-hours number / 911 should there be a change in any of these risk factors.     Appearance:   Appropriate    Eye Contact:   Good    Psychomotor Behavior: Normal    Attitude:   Cooperative    Orientation:   All   Speech    Rate / Production: Normal     Volume:  Normal    Mood:    Normal   Affect:    Appropriate    Thought Content:  Clear    Thought Form:  Coherent  Logical    Insight:    Good      Medication Review:   No current psychiatric medications prescribed      Medication Compliance:   NA     Changes in Health Issues:   None reported     Chemical Use Review:   Substance Use: Chemical use reviewed, no active concerns identified      Tobacco Use: No current tobacco use.       Collateral Reports Completed:   Not Applicable    PLAN: (Client Tasks / Therapist Tasks / Other)  Recommit to: Use S/L technique, less managing of one another's feelings in communicating, more concise talking, rating their mulligans, talk before session about what they want to work on.   Joyce to be the activator at home. Also to make requests of Jer versus talking off the top of her head.  Consequences for bad behavior for the youngest, 1,2,3 Magic, consistency as parents, family meeting, Super Nanny.  Schedule date night every other week. Jer to investigate therapists. Give him referrals.  Continue with 1,2,3 Magic, Jer especially  Also consider a budget. Assigned Jaya Vazquez and bsf.  No comments when driving, Jer to work in the basement,  Jer to notice how transitions can make him anxious.    Yareli Walker                                                         ________________________________________________________________________                                                 Treatment Plan    Client's Name: Joyce Murry  YOB: 1975    Date: 1/25/2018    DSM-V Diagnoses: 300.02 - Generalized Anxiety Disorder By History; V71.09 - No Diagnosis  Psychosocial / Contextual Factors: marital discord  WHODAS: 14    Referral / Collaboration:  Referral to another professional/service is not indicated at this time..    Anticipated number of session or this episode of care: 10      MeasurableTreatment Goal(s) related to diagnosis / functional impairment(s)  Goal 1: Client will lower GAD7 score to 3 or below.    I will know I've met my goal when I'm less anxious in general.      Objective #A (Client Action)    Client will participate in couples counseling to address communication breakdowns.  Status: Continued - Date(s):1/25/2018    Intervention(s)  Therapist will work with couple on communication..    Objective #B  Client will look to pursue work outside the home..  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    Objective #C  Client will participate in individual therapy for her anxiety disorder..  Status: New - Date: 3/1/99088/25/2018    Intervention(s)  Therapist will teach CBT.      Client has reviewed and agreed to the above plan.      Yareli Walker  January 25, 2018

## 2018-03-22 NOTE — MR AVS SNAPSHOT
MRN:[id number]                      After Visit Summary   3/22/2018    Joyce Murry    MRN: [id number]           Visit Information        Provider Department      3/22/2018 9:00 AM Denise Yareli [facility] Generic      Your next 10 appointments already scheduled     Apr 12, 2018  9:00 AM CDT   Return Visit with Yareli Walker   [facility] [place] ([facility])    [address]  [facility] [address] [phone]              MyChart Information     [employer] gives you secure access to your electronic health record. If you see a primary care provider, you can also send messages to your care team and make appointments. If you have questions, please call your primary care clinic.  If you do not have a primary care provider, please call [phone] and they will assist you.        Care EveryWhere ID     This is your Care EveryWhere ID. This could be used by other organizations to access your [place] medical records  [id number]        Equal Access to Services     ANNIE KRISHNAMURTHY : Hadii dulce maria machado hadasho Soomaali, waaxda luqadaha, qaybta kaalmada adeegyada, abhishek khan. So [facility] [phone].    ATENCIÓN: Si habla español, tiene a uribe disposición servicios gratuitos de asistencia lingüística. Llame al [phone].    We comply with applicable federal civil rights laws and [place] laws. We do not discriminate on the basis of race, color, national origin, age, disability, sex, sexual orientation, or gender identity.

## 2018-04-12 ENCOUNTER — OFFICE VISIT (OUTPATIENT)
Dept: PSYCHOLOGY | Facility: CLINIC | Age: 43
End: 2018-04-12
Payer: COMMERCIAL

## 2018-04-12 DIAGNOSIS — F41.1 GAD (GENERALIZED ANXIETY DISORDER): Primary | ICD-10-CM

## 2018-04-12 PROCEDURE — 90847 FAMILY PSYTX W/PT 50 MIN: CPT | Performed by: MARRIAGE & FAMILY THERAPIST

## 2018-04-12 NOTE — PROGRESS NOTES
"                                             Progress Note    Client Name: Joyce Murry  Date: 4/12/2018         Service Type: Individual      Session Start Time: 9:30  Session End Time: 10:15      Session Length: 45     Session #: Return     Attendees: Client and Spouse / Significant Other    Treatment Plan Last Reviewed: 2/8/2018  PHQ-9 / GABRIEL-7 : Each session     DATA      Progress Since Last Session (Related to Symptoms / Goals / Homework):   Symptoms: Stable    Homework: Partially completed      Episode of Care Goals: Satisfactory progress - ACTION (Actively working towards change); Intervened by reinforcing change plan / affirming steps taken     Current / Ongoing Stressors and Concerns:  Joyce and Jer talked about their sex life today. Joyce has no interest but likes to be \"tickled\" which is arousing for Jer. When he initiates sex after tickling, Joyce is too relaxed and not receptive. She does not want sex then but will sometimes go along with it. Says she wants sex to be more fiery. They don't kiss. It's hard for her to be vulnerable. Recognizes she needs control over it. Talked about putting it in her hands to decide when, where and how. Also getting some pretty lingerie, maybe some sex toys. She seemed receptive to this idea. Also sees she is glass half full and can be overly focused on things that aren't working. Acknowledges there are things that are working. Also stated she may have had better sex earlier in other relationships but it tends to end up where it is with Jer right now. He is able to pleasure her but not through intercourse. Talked about her having an affair. Jer seemed ok with the conversation. Says he doesn't take it personally.     Treatment Objective(s) Addressed in This Session:   Identify goals for couples work.  Improve communication by Joyce validating Jer more often.  Couple to devise list of consequences for daughter and begin implementing them balanced with praise.  Create " list of priorities including 1,2,3 magic implementing, have family meeting, watch Joyce Hamilton to do punchlist at home and start house projects room by room  Couple to go out together every other week and do an activity or spend time with friends  Consider dev and Jaya Vazquez.     Intervention:   Relationship counseling   Strategies to lower anxiety.   S/L technique regularly to ground their communication   Resist communicating when dysregulated.     ASSESSMENT: Current Emotional / Mental Status (status of significant symptoms):   Risk status (Self / Other harm or suicidal ideation)   Client denies current fears or concerns for personal safety.   Client denies current or recent suicidal ideation or behaviors.   Client denies current or recent homicidal ideation or behaviors.   Client denies current or recent self injurious behavior or ideation.   Client denies other safety concerns.   A safety and risk management plan has not been developed at this time, however client was given the after-hours number / 911 should there be a change in any of these risk factors.     Appearance:   Appropriate    Eye Contact:   Good    Psychomotor Behavior: Normal    Attitude:   Cooperative    Orientation:   All   Speech    Rate / Production: Normal     Volume:  Normal    Mood:    Normal   Affect:    Appropriate    Thought Content:  Clear    Thought Form:  Coherent  Logical    Insight:    Good      Medication Review:   No current psychiatric medications prescribed      Medication Compliance:   NA     Changes in Health Issues:   None reported     Chemical Use Review:   Substance Use: Chemical use reviewed, no active concerns identified      Tobacco Use: No current tobacco use.       Collateral Reports Completed:   Not Applicable    PLAN: (Client Tasks / Therapist Tasks / Other)  Recommit to: Use S/L technique, less managing of one another's feelings in communicating, more concise talking, rating their mulligans, talk before  session about what they want to work on.   Joyce to be the activator at home. Also to make requests of Jer versus talking off the top of her head.  Consequences for bad behavior for the youngest, 1,2,3 Magic, consistency as parents, family meeting, Super Nanny.  Schedule date night every other week. Jer to investigate therapists. Give him referrals.  Continue with 1,2,3 ic, Jer especially  Also consider a budget. Assigned Jaya Vazquez and bsf.  No comments when driving, Jer to work in the basement, Jer to notice how transitions can make him anxious.  Explore sexual activity with Joyce in control.    Yareli Alicia [facility]                                                         ________________________________________________________________________                                                 Treatment Plan    Client's Name: Joyce Murry  YOB: 1975    Date: 1/25/2018    DSM-V Diagnoses: 300.02 - Generalized Anxiety Disorder By History; V71.09 - No Diagnosis  Psychosocial / Contextual Factors: marital discord  WHODAS: 14    Referral / Collaboration:  Referral to another professional/service is not indicated at this time..    Anticipated number of session or this episode of care: 10      MeasurableTreatment Goal(s) related to diagnosis / functional impairment(s)  Goal 1: Client will lower GAD7 score to 3 or below.    I will know I've met my goal when I'm less anxious in general.      Objective #A (Client Action)    Client will participate in couples counseling to address communication breakdowns.  Status: Continued - Date(s):1/25/2018    Intervention(s)  Therapist will work with couple on communication..    Objective #B  Client will look to pursue work outside the home..  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    Objective #C  Client will participate in individual therapy for her anxiety  disorder..  Status: New - Date: 3/1/65028/25/2018    Intervention(s)  Therapist will teach CBT.      Client has reviewed and agreed to the above plan.      Yareli Walker  January 25, 2018

## 2018-04-12 NOTE — MR AVS SNAPSHOT
MRN:[id number]                      After Visit Summary   4/12/2018    Joyec Murry    MRN: [id number]           Visit Information        Provider Department      4/12/2018 9:00 AM Yareli Walker [facility] Generic      MyChart Information     MyChart gives you secure access to your electronic health record. If you see a primary care provider, you can also send messages to your care team and make appointments. If you have questions, please call your primary care clinic.  If you do not have a primary care provider, please call [phone] and they will assist you.        Care EveryWhere ID     This is your Care EveryWhere ID. This could be used by other organizations to access your [place] medical records  [id number]        Equal Access to Services     ANNIE KRISHNAMURTHY : Renuka Fleming, wamaira irizarry, qajamal kaaleder vale, abhishek khan. So [facility] [phone].    ATENCIÓN: Si habla español, tiene a uribe disposición servicios gratuitos de asistencia lingüística. Llame al [phone].    We comply with applicable federal civil rights laws and [place] laws. We do not discriminate on the basis of race, color, national origin, age, disability, sex, sexual orientation, or gender identity.

## 2018-04-13 ASSESSMENT — PATIENT HEALTH QUESTIONNAIRE - PHQ9: SUM OF ALL RESPONSES TO PHQ QUESTIONS 1-9: 1

## 2018-05-10 ENCOUNTER — OFFICE VISIT (OUTPATIENT)
Dept: PSYCHOLOGY | Facility: CLINIC | Age: 43
End: 2018-05-10
Payer: COMMERCIAL

## 2018-05-10 DIAGNOSIS — F41.1 GAD (GENERALIZED ANXIETY DISORDER): Primary | ICD-10-CM

## 2018-05-10 PROCEDURE — 90847 FAMILY PSYTX W/PT 50 MIN: CPT | Performed by: MARRIAGE & FAMILY THERAPIST

## 2018-05-10 ASSESSMENT — ANXIETY QUESTIONNAIRES
5. BEING SO RESTLESS THAT IT IS HARD TO SIT STILL: NOT AT ALL
GAD7 TOTAL SCORE: 10
6. BECOMING EASILY ANNOYED OR IRRITABLE: MORE THAN HALF THE DAYS
7. FEELING AFRAID AS IF SOMETHING AWFUL MIGHT HAPPEN: SEVERAL DAYS
3. WORRYING TOO MUCH ABOUT DIFFERENT THINGS: NEARLY EVERY DAY
IF YOU CHECKED OFF ANY PROBLEMS ON THIS QUESTIONNAIRE, HOW DIFFICULT HAVE THESE PROBLEMS MADE IT FOR YOU TO DO YOUR WORK, TAKE CARE OF THINGS AT HOME, OR GET ALONG WITH OTHER PEOPLE: VERY DIFFICULT
1. FEELING NERVOUS, ANXIOUS, OR ON EDGE: MORE THAN HALF THE DAYS
2. NOT BEING ABLE TO STOP OR CONTROL WORRYING: SEVERAL DAYS

## 2018-05-10 ASSESSMENT — PATIENT HEALTH QUESTIONNAIRE - PHQ9: 5. POOR APPETITE OR OVEREATING: SEVERAL DAYS

## 2018-05-10 NOTE — MR AVS SNAPSHOT
MRN:[id number]                      After Visit Summary   5/10/2018    Joyce Murry    MRN: [id number]           Visit Information        Provider Department      5/10/2018 11:00 AM Denise Yareli Alicia [facility] Generic      Your next 10 appointments already scheduled     May 24, 2018  9:00 AM CDT   Return Visit with Yareli Walker   [facility] ([facility])    [address]  [facility] [id number] [phone]              MyChart Information     Get10hart gives you secure access to your electronic health record. If you see a primary care provider, you can also send messages to your care team and make appointments. If you have questions, please call your primary care clinic.  If you do not have a primary care provider, please call [phone] and they will assist you.        Care EveryWhere ID     This is your Care EveryWhere ID. This could be used by other organizations to access your [place] medical records  [id number]        Equal Access to Services     ANNIE KRISHNAMURTHY : Hadii dulce maria machado hadasho Sojorge albertoali, waaxda luqadaha, qaybta kaalmada adeegyada, abhishek khan. So [facility] [phone].    ATENCIÓN: Si habla español, tiene a uribe disposición servicios gratuitos de asistencia lingüística. Llame al [phone].    We comply with applicable federal civil rights laws and [place] laws. We do not discriminate on the basis of race, color, national origin, age, disability, sex, sexual orientation, or gender identity.

## 2018-05-10 NOTE — Clinical Note
Dr. Rose, I have recommended an SSRI for Joyce as she is struggling with significant anxiety issues and irritability for some time. She is reticent to take an antidepressant but was willing to research it. Hopefully she will be in touch.  Yareli Walker, MEAGHAN [facility] Tonya

## 2018-05-10 NOTE — PROGRESS NOTES
Progress Note    Client Name: Joyce Murry  Date: 5/10/2018         Service Type: Individual      Session Start Time: 9:30  Session End Time: 10:15      Session Length: 45     Session #: Return     Attendees: Client and Spouse / Significant Other    Treatment Plan Last Reviewed: 2/8/2018  PHQ-9 / GABRIEL-7 : Each session     DATA      Progress Since Last Session (Related to Symptoms / Goals / Homework):   Symptoms: Stable    Homework: Partially completed      Episode of Care Goals: Satisfactory progress - ACTION (Actively working towards change); Intervened by reinforcing change plan / affirming steps taken     Current / Ongoing Stressors and Concerns:  Joyce and Jer talk about needing to come more often. Struggling with feeling irritable very often around each other. Joyce probably more so than Jer. He is overwhelmed with work which causes him to be short with kids. Joyce is known to step in and this makes him mad at her. This is what brought them into counseling in the first place. Couple also acknowledge they have not been as diligent about using [employer]. Also Joyce is more busy with work and they may need additional help at home. Joyce continues to feel like she is missing out on a happy marriage/life. She has a half empty glass. Therapist recommended she seriously consider an antidepressant. She says she feels the weight of the world and cannot even watch the news b/c she can't handle it. Feels taking a med is a failure but helped her see the potential benefit for them both to take sometime during this stressful time of their life.     Treatment Objective(s) Addressed in This Session:   Identify goals for couples work.  Improve communication by Joyce validating Jer more often.  Couple to devise list of consequences for daughter and begin implementing them balanced with praise.  Create list of priorities including 1,2,3 magic implementing, have family meeting, watch  Joyce Hamilton to do punchlist at home and start house projects room by room  Couple to go out together every other week and do an activity or spend time with friends  Consider dev and Jaya Vazquez.     Intervention:   Relationship counseling   Strategies to lower anxiety.   S/L technique regularly to ground their communication   Resist communicating when dysregulated.     ASSESSMENT: Current Emotional / Mental Status (status of significant symptoms):   Risk status (Self / Other harm or suicidal ideation)   Client denies current fears or concerns for personal safety.   Client denies current or recent suicidal ideation or behaviors.   Client denies current or recent homicidal ideation or behaviors.   Client denies current or recent self injurious behavior or ideation.   Client denies other safety concerns.   A safety and risk management plan has not been developed at this time, however client was given the after-hours number / 911 should there be a change in any of these risk factors.     Appearance:   Appropriate    Eye Contact:   Good    Psychomotor Behavior: Normal    Attitude:   Cooperative    Orientation:   All   Speech    Rate / Production: Normal     Volume:  Normal    Mood:    Normal   Affect:    Appropriate    Thought Content:  Clear    Thought Form:  Coherent  Logical    Insight:    Good      Medication Review:   No current psychiatric medications prescribed      Medication Compliance:   NA     Changes in Health Issues:   None reported     Chemical Use Review:   Substance Use: Chemical use reviewed, no active concerns identified      Tobacco Use: No current tobacco use.       Collateral Reports Completed:   Not Applicable    PLAN: (Client Tasks / Therapist Tasks / Other)  Recommit to: Use S/L technique, less managing of one another's feelings in communicating, more concise talking, rating their mulligans, talk before session about what they want to work on.   Joyce to be the activator at home. Also to  make requests of Jer versus talking off the top of her head.  Consequences for bad behavior for the youngest, 1,2,3 Magic, consistency as parents, family meeting, Super Nanny.  Schedule date night every other week. Jer to investigate therapists. Give him referrals.  Continue with 1,2,3 [place], Jer especially  Also consider a budget. Assigned Jaya Vazquez and bsf.  No comments when driving, Jer to work in the basement, Jer to notice how transitions can make him anxious.  Explore sexual activity with Joyce in control.  Jer to work with Mark Anthony Cisse on managing anger/frustration.   Couple to consider med eval    Yareli Alicia [place]                                                         ________________________________________________________________________                                                 Treatment Plan    Client's Name: Joyce Murry  YOB: 1975    Date: 1/25/2018    DSM-V Diagnoses: 300.02 - Generalized Anxiety Disorder By History; V71.09 - No Diagnosis  Psychosocial / Contextual Factors: marital discord  WHODAS: 14    Referral / Collaboration:  Referral to another professional/service is not indicated at this time..    Anticipated number of session or this episode of care: 10      MeasurableTreatment Goal(s) related to diagnosis / functional impairment(s)  Goal 1: Client will lower GAD7 score to 3 or below.    I will know I've met my goal when I'm less anxious in general.      Objective #A (Client Action)    Client will participate in couples counseling to address communication breakdowns.  Status: Continued - Date(s):1/25/2018    Intervention(s)  Therapist will work with couple on communication..    Objective #B  Client will look to pursue work outside the home..  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    Objective #C  Client will participate in individual therapy for her anxiety  disorder..  Status: New - Date: 3/1/64947/25/2018    Intervention(s)  Therapist will teach CBT.      Client has reviewed and agreed to the above plan.      Yareli Walker  January 25, 2018

## 2018-05-11 ASSESSMENT — ANXIETY QUESTIONNAIRES: GAD7 TOTAL SCORE: 10

## 2018-05-11 ASSESSMENT — PATIENT HEALTH QUESTIONNAIRE - PHQ9: SUM OF ALL RESPONSES TO PHQ QUESTIONS 1-9: 8

## 2018-05-24 ENCOUNTER — OFFICE VISIT (OUTPATIENT)
Dept: PSYCHOLOGY | Facility: CLINIC | Age: 43
End: 2018-05-24
Payer: COMMERCIAL

## 2018-05-24 DIAGNOSIS — F41.1 GAD (GENERALIZED ANXIETY DISORDER): Primary | ICD-10-CM

## 2018-05-24 PROCEDURE — 90847 FAMILY PSYTX W/PT 50 MIN: CPT | Performed by: MARRIAGE & FAMILY THERAPIST

## 2018-05-24 ASSESSMENT — ANXIETY QUESTIONNAIRES
7. FEELING AFRAID AS IF SOMETHING AWFUL MIGHT HAPPEN: SEVERAL DAYS
GAD7 TOTAL SCORE: 9
3. WORRYING TOO MUCH ABOUT DIFFERENT THINGS: MORE THAN HALF THE DAYS
5. BEING SO RESTLESS THAT IT IS HARD TO SIT STILL: SEVERAL DAYS
1. FEELING NERVOUS, ANXIOUS, OR ON EDGE: MORE THAN HALF THE DAYS
IF YOU CHECKED OFF ANY PROBLEMS ON THIS QUESTIONNAIRE, HOW DIFFICULT HAVE THESE PROBLEMS MADE IT FOR YOU TO DO YOUR WORK, TAKE CARE OF THINGS AT HOME, OR GET ALONG WITH OTHER PEOPLE: SOMEWHAT DIFFICULT
2. NOT BEING ABLE TO STOP OR CONTROL WORRYING: SEVERAL DAYS
6. BECOMING EASILY ANNOYED OR IRRITABLE: SEVERAL DAYS

## 2018-05-24 ASSESSMENT — PATIENT HEALTH QUESTIONNAIRE - PHQ9: 5. POOR APPETITE OR OVEREATING: SEVERAL DAYS

## 2018-05-24 NOTE — MR AVS SNAPSHOT
MRN:[id number]                      After Visit Summary   5/24/2018    Joyce Murry    MRN: [id number]           Visit Information        Provider Department      5/24/2018 9:00 AM Yareli Walker [facility] Generic      MyChart Information     MyChart gives you secure access to your electronic health record. If you see a primary care provider, you can also send messages to your care team and make appointments. If you have questions, please call your primary care clinic.  If you do not have a primary care provider, please call [phone] and they will assist you.        Care EveryWhere ID     This is your Care EveryWhere ID. This could be used by other organizations to access your [place] medical records  [id number]        Equal Access to Services     ANNIE KRISHNAMURTHY : Renuka Fleming, wamaira irizarry, qajamal kaaleder vale, abhishek khan. So [facility] [phone].    ATENCIÓN: Si habla español, tiene a uribe disposición servicios gratuitos de asistencia lingüística. Llame al [phone].    We comply with applicable federal civil rights laws and [place] laws. We do not discriminate on the basis of race, color, national origin, age, disability, sex, sexual orientation, or gender identity.

## 2018-05-24 NOTE — PROGRESS NOTES
Progress Note    Client Name: Joyce Murry  Date: 5/24/2018         Service Type: Individual      Session Start Time: 9:30  Session End Time: 10:15      Session Length: 45     Session #: Return     Attendees: Client and Spouse / Significant Other    Treatment Plan Last Reviewed: 5/24/2018  PHQ-9 / GABRIEL-7 : Each session     DATA      Progress Since Last Session (Related to Symptoms / Goals / Homework):   Symptoms: Stable    Homework: Partially completed      Episode of Care Goals: Satisfactory progress - ACTION (Actively working towards change); Intervened by reinforcing change plan / affirming steps taken     Current / Ongoing Stressors and Concerns:  Joyce and Joo talk about needing their space in the home. They tend to stress each other contreras when parenting. Talked about taking shifts where they let the other parent take control and they either leave the house (in joyce's case) or go to the basement (more for joo). Also want to work on kissing. Agree to start by kissing on the mouth and see how this feels.Joyce to work on adjusting expectations in all areas of her life.     Treatment Objective(s) Addressed in This Session:   Identify goals for couples work.  Improve communication by Joyce validating Joo more often.  Couple to devise list of consequences for daughter and begin implementing them balanced with praise.  Create list of priorities including 1,2,3 magic implementing, have family meeting, watch Alfonso   Joyce to do punchlist at home and start house projects room by room  Couple to go out together every other week and do an activity or spend time with friends  Consider bsge and Jaya Vazquez.     Intervention:   Relationship counseling   Strategies to lower anxiety.   S/L technique regularly to ground their communication   Resist communicating when dysregulated.     ASSESSMENT: Current Emotional / Mental Status (status of significant symptoms):   Risk status  (Self / Other harm or suicidal ideation)   Client denies current fears or concerns for personal safety.   Client denies current or recent suicidal ideation or behaviors.   Client denies current or recent homicidal ideation or behaviors.   Client denies current or recent self injurious behavior or ideation.   Client denies other safety concerns.   A safety and risk management plan has not been developed at this time, however client was given the after-hours number / 911 should there be a change in any of these risk factors.     Appearance:   Appropriate    Eye Contact:   Good    Psychomotor Behavior: Normal    Attitude:   Cooperative    Orientation:   All   Speech    Rate / Production: Normal     Volume:  Normal    Mood:    Normal   Affect:    Appropriate    Thought Content:  Clear    Thought Form:  Coherent  Logical    Insight:    Good      Medication Review:   No current psychiatric medications prescribed      Medication Compliance:   NA     Changes in Health Issues:   None reported     Chemical Use Review:   Substance Use: Chemical use reviewed, no active concerns identified      Tobacco Use: No current tobacco use.       Collateral Reports Completed:   Not Applicable    PLAN: (Client Tasks / Therapist Tasks / Other)  Recommit to: Use S/L technique, less managing of one another's feelings in communicating, more concise talking, rating their mulligans, talk before session about what they want to work on.   Joyce to be the activator at home. Also to make requests of Jer versus talking off the top of her head.  Consequences for bad behavior for the youngest, 1,2,3 Magic, consistency as parents, family meeting, Super Nanny.  Schedule date night every other week. Jer to investigate therapists. Give him referrals.  Continue with 1,2,3 Magic, Jer especially  Also consider a budget. Assigned Jaya Vazquez and dev.  No comments when driving, Jer to work in the basement, Jer to notice how transitions can make him  anxious.  Explore sexual activity with Joyce in control.  Jer to work with Mark Anthony Cisse on managing anger/frustration.   Couple to consider med eval    Yareli Walker                                                         ________________________________________________________________________                                                 Treatment Plan    Client's Name: Joyce Murry  YOB: 1975    Date: 5/24/2018    DSM-V Diagnoses: 300.02 - Generalized Anxiety Disorder By History; V71.09 - No Diagnosis  Psychosocial / Contextual Factors: marital discord  WHODAS: 14    Referral / Collaboration:  Referral to another professional/service is not indicated at this time..    Anticipated number of session or this episode of care: 10      MeasurableTreatment Goal(s) related to diagnosis / functional impairment(s)  Goal 1: Client will lower GAD7 score to 3 or below.    I will know I've met my goal when I'm less anxious in general.      Objective #A (Client Action)    Client will participate in couples counseling to address communication breakdowns.  Status: Continued - Date(s):5/24/2018    Intervention(s)  Therapist will work with couple on communication..    Objective #B  Client will look to pursue work outside the home..  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    Objective #C  Client will participate in individual therapy for her anxiety disorder..  Status: Continued - Date(s):5/24/2018    Intervention(s)  Therapist will teach CBT.      Client has reviewed and agreed to the above plan.      Yareli Walker  May 24, 2018

## 2018-05-25 ASSESSMENT — PATIENT HEALTH QUESTIONNAIRE - PHQ9: SUM OF ALL RESPONSES TO PHQ QUESTIONS 1-9: 5

## 2018-05-25 ASSESSMENT — ANXIETY QUESTIONNAIRES: GAD7 TOTAL SCORE: 9

## 2018-05-31 ENCOUNTER — TRANSFERRED RECORDS (OUTPATIENT)
Dept: HEALTH INFORMATION MANAGEMENT | Facility: CLINIC | Age: 43
End: 2018-05-31

## 2018-10-12 ENCOUNTER — DOCUMENTATION ONLY (OUTPATIENT)
Dept: INTERNAL MEDICINE | Facility: CLINIC | Age: 43
End: 2018-10-12

## 2018-11-19 ENCOUNTER — OFFICE VISIT (OUTPATIENT)
Dept: INTERNAL MEDICINE | Facility: CLINIC | Age: 43
End: 2018-11-19
Payer: COMMERCIAL

## 2018-11-19 VITALS
OXYGEN SATURATION: 100 % | WEIGHT: 133 LBS | BODY MASS INDEX: 21.15 KG/M2 | HEART RATE: 63 BPM | DIASTOLIC BLOOD PRESSURE: 76 MMHG | SYSTOLIC BLOOD PRESSURE: 104 MMHG

## 2018-11-19 DIAGNOSIS — Z00.00 HEALTH CARE MAINTENANCE: Primary | ICD-10-CM

## 2018-11-19 DIAGNOSIS — Z00.00 HEALTH CARE MAINTENANCE: ICD-10-CM

## 2018-11-19 LAB
BASOPHILS # BLD AUTO: 0 10E9/L (ref 0–0.2)
BASOPHILS NFR BLD AUTO: 0.4 %
CREAT SERPL-MCNC: 0.75 MG/DL (ref 0.52–1.04)
DIFFERENTIAL METHOD BLD: NORMAL
EOSINOPHIL # BLD AUTO: 0.1 10E9/L (ref 0–0.7)
EOSINOPHIL NFR BLD AUTO: 1.9 %
ERYTHROCYTE [DISTWIDTH] IN BLOOD BY AUTOMATED COUNT: 11.9 % (ref 10–15)
GFR SERPL CREATININE-BSD FRML MDRD: 83 ML/MIN/1.7M2
HCT VFR BLD AUTO: 39.5 % (ref 35–47)
HGB BLD-MCNC: 12.8 G/DL (ref 11.7–15.7)
IMM GRANULOCYTES # BLD: 0 10E9/L (ref 0–0.4)
IMM GRANULOCYTES NFR BLD: 0.6 %
LYMPHOCYTES # BLD AUTO: 1.7 10E9/L (ref 0.8–5.3)
LYMPHOCYTES NFR BLD AUTO: 23.6 %
MCH RBC QN AUTO: 28.8 PG (ref 26.5–33)
MCHC RBC AUTO-ENTMCNC: 32.4 G/DL (ref 31.5–36.5)
MCV RBC AUTO: 89 FL (ref 78–100)
MONOCYTES # BLD AUTO: 0.7 10E9/L (ref 0–1.3)
MONOCYTES NFR BLD AUTO: 9.2 %
NEUTROPHILS # BLD AUTO: 4.6 10E9/L (ref 1.6–8.3)
NEUTROPHILS NFR BLD AUTO: 64.3 %
NRBC # BLD AUTO: 0 10*3/UL
NRBC BLD AUTO-RTO: 0 /100
PLATELET # BLD AUTO: 264 10E9/L (ref 150–450)
RBC # BLD AUTO: 4.44 10E12/L (ref 3.8–5.2)
TSH SERPL DL<=0.005 MIU/L-ACNC: 2.07 MU/L (ref 0.4–4)
WBC # BLD AUTO: 7.2 10E9/L (ref 4–11)

## 2018-11-19 RX ORDER — DOXYCYCLINE 100 MG/1
CAPSULE ORAL DAILY
COMMUNITY
End: 2022-07-06

## 2018-11-19 ASSESSMENT — PAIN SCALES - GENERAL: PAINLEVEL: NO PAIN (0)

## 2018-11-19 NOTE — MR AVS SNAPSHOT
After Visit Summary   11/19/2018    Joyce Murry    MRN: [id number]           Patient Information     Date Of Birth          1975        Visit Information        Provider Department      11/19/2018 4:25 PM Cherie Rose MD [facility]        Today's Diagnoses     Health care maintenance    -  1      Care Instructions    Primary Care Center Medication Refill Request Information:  * Please contact your pharmacy regarding ANY request for medication refills.  ** [facility] Prescription Fax = [phone]  * Please allow 3 business days for routine medication refills.  * Please allow 5 business days for controlled substance medication refills.     Primary Care Center Test Result notification information:  *You will be notified with in 7-10 days of your appointment day regarding the results of your test.  If you are on MyChart you will be notified as soon as the provider has reviewed the results and signed off on them.    [facility]: [phone]               Follow-ups after your visit        Future tests that were ordered for you today     Open Future Orders        Priority Expected Expires Ordered    TSH with free T4 reflex Routine 11/19/2018 12/3/2018 11/19/2018    CBC with platelets differential Routine 11/19/2018 12/3/2018 11/19/2018    Creatinine Routine 11/19/2018 11/19/2019 11/19/2018            Who to contact     Please call your clinic at [phone] to:    Ask questions about your health    Make or cancel appointments    Discuss your medicines    Learn about your test results    Speak to your doctor            Additional Information About Your Visit        MyChart Information     [employer] gives you secure access to your electronic health record. If you see a primary care provider, you can also send messages to your care team and make appointments. If you have questions, please call your primary care clinic.  If you do not have a primary care provider, please call  [phone] and they will assist you.      [employer] is an electronic gateway that provides easy, online access to your medical records. With [employer], you can request a clinic appointment, read your test results, renew a prescription or communicate with your care team.     To access your existing account, please contact your [facility] or call [phone] for assistance.        Care EveryWhere ID     This is your Care EveryWhere ID. This could be used by other organizations to access your [facility] medical records  [id number]        Your Vitals Were     Pulse Last Period Pulse Oximetry Breastfeeding? BMI (Body Mass Index)       63 10/25/2018 (Approximate) 100% No 21.15 kg/m2        Blood Pressure from Last 3 Encounters:   11/19/18 104/76   01/11/18 113/78   12/19/17 96/73    Weight from Last 3 Encounters:   11/19/18 60.3 kg (133 lb)   01/11/18 58.3 kg (128 lb 9.6 oz)   12/19/17 58.1 kg (128 lb)                 Today's Medication Changes          These changes are accurate as of 11/19/18  5:05 PM.  If you have any questions, ask your nurse or doctor.               Stop taking these medicines if you haven't already. Please contact your care team if you have questions.     ALPRAZolam 0.25 MG tablet   Commonly known as:  XANAX   Stopped by:  Cherie Rose MD           cyclobenzaprine 5 MG tablet   Commonly known as:  FLEXERIL   Stopped by:  Cherie Rose MD                    Primary Care Provider Office Phone # Fax #    Cherie GILLILAND [phone]       [address] [address]        Equal Access to Services     [facility] : Hadjanette Fleming, waaxda lujason, qaybta marioalabhishek paige. So [facility] [phone].    ATENCIÓN: Si habla español, tiene a uribe disposición servicios gratuitos de asistencia lingüística. Llame al [phone].    We comply with applicable federal civil rights  laws and [place] laws. We do not discriminate on the basis of race, color, national origin, age, disability, sex, sexual orientation, or gender identity.            Thank you!     Thank you for choosing [facility]  for your care. Our goal is always to provide you with excellent care. Hearing back from our patients is one way we can continue to improve our services. Please take a few minutes to complete the written survey that you may receive in the mail after your visit with us. Thank you!             Your Updated Medication List - Protect others around you: Learn how to safely use, store and throw away your medicines at www.disposemymeds.org.          This list is accurate as of 11/19/18  5:05 PM.  Always use your most recent med list.                   Brand Name Dispense Instructions for use Diagnosis    acyclovir 200 MG capsule    ZOVIRAX     Take 2 capsules by mouth every 8 hours        doxycycline 100 MG capsule    VIBRAMYCIN     Take by mouth daily

## 2018-11-19 NOTE — NURSING NOTE
Chief Complaint   Patient presents with     Wellness Visit     general wellness visit per pt      Zainab Zavala at 4:26 PM on 11/19/2018.

## 2018-11-19 NOTE — PROGRESS NOTES
"[facility]  Primary Care Center   Cherie Rose MD  11/19/2018      Chief Complaint:   Wellness Visit     History of Present Illness:   Joyce Murry is a 43 year old female who presents for a wellness visit.    She is wondering about symptoms of menopause as she has started having issues with irritability as well as acne.  She is still having regular menstrual periods and notes increased irritability just prior to her periods.  She denies any hot flashes or flushing and, in fact, always feels cold.  She feels her weight has been up and down and she feels more bloated.  Compared to about a year ago, her weight is up about 5 pounds.  She has been working from home and is generally sitting at a computer where she can snack out of habit.  She used to go to the gym regularly 5 days a week and now goes only about 3 days.  She has been quite fatigued.  She was having issue sleeping earlier in the year.  At the time, she was also having issues with anxiety.  She cut out caffeine and since then her sleep has improved.    She does have a therapist and, primarily, they work more on anxiety as she is definitely cognizant of that in her life.  She has some \"down days\" but does not feel that she is truly depressed.      She does get lightheaded if she stands up quickly.  She denies any chest pain or shortness of breath when working out.  She does have an occasional skipped beat    She had a colonoscopy last year as she was having some rectal bleeding.  This was normal however she has continued to have occasional bright red bleeding.  She is not sure if this is from straining or related to her diet.  She has been more constipated recently.  She is eating more, especially gluten and dairy which my be contributing.  She has not had any bleeding in the past few days.    She has been more forgetful and absent-minded recently.  Examples she gives is forgetting where she parked her car at [employer] and showing up an hour early to pick " her kids up from school.  Her aunt had Alzheimer's disease although there is no family history of early onset dementia.    She saw her gynecologist earlier this year and her pap smear and mammogram are up to date.  She had her cholesterol tested though biometric screening and believes it was all normal.       Review of Systems:   Pertinent items are noted in HPI, remainder of complete ROS is negative.      Active Medications:      doxycycline (VIBRAMYCIN) 100 MG capsule, Take by mouth daily, Disp: , Rfl:      acyclovir (ZOVIRAX) 200 MG capsule, Take 2 capsules by mouth every 8 hours, Disp: , Rfl:      Allergies:   Augmentin      Past Medical History:  Genital herpes  Hyperlipidemia      Past Surgical History:  Dilation and curettage  2010  Mandan teeth excision    Family History:    Pulmonary hypertension - father  Coronary artery disease - father  Breast cancer - mother   lung cancer - maternal grandfather   Alzheimer's disease  Type 1 diabetes - mother   CLL - mother      Social History:   Presents to clinic alone  Tobacco Use: Former smoker, quit 2002  Alcohol Use: Rare     Physical Exam:   /76 (BP Location: Right arm, Patient Position: Sitting)  Pulse 63  Wt 60.3 kg (133 lb)  LMP 10/25/2018 (Approximate)  SpO2 100%  Breastfeeding? No  BMI 21.15 kg/m2     Constitutional: Healthy, alert, no distress and cooperative  Head: Normocephalic. No masses, lesions, tenderness or abnormalities  Eyes: PERRL, no conjunctival injection  ENT: Throat normal without erythema or exudate, no cervical lymphadenopathy  Neck: Thyroid smooth, nonenlarged, nontender  Cardiovascular: JVP 7 cm. RRR, S1,S2 no murmurs, clicks, rubs, or gallops. No pretibial edema.  No carotid bruits.  PT pulses normal  Respiratory: Clear to auscultation and percussion bilaterally.   Gastrointestinal: BS NA. Soft, nontender, no hepatosplenomegaly or mass.   Extremities: Joints are normal. No gross deformities noted, gait normal and normal muscle  tone.    Skin: No suspicious lesions or rashes   Neurologic: Mini-co/5  Psychiatric: Mentation appears normal and affect normal  Hematologic/Lymphatic/Immunologic: Normal cervical, anterior, posterior, submandibular, and submental lymph nodes      Assessment and Plan:  Overall a very healthy 43-year-old woman.  Some of her symptoms may represent premenopausal symptoms, but she is still menstruating regularly without functional uterine bleeding.  We discussed maintaining her physical activity, getting away from the desk sometimes during the day.  Her mini cog testing is perfect, making the probability of an early dementia very low.    Health care maintenance  Discussed that it is possible she is early perimenopause however she is still currently having regular periods.  Many of her symptoms may be due to lifestyle changes.  Recommended she try to include more activity into her daily routine as she now has a more sedentary job working at home.  Will screen for thyroid dysfunction and anemia with her complaint of fatigue.    - TSH with free T4 reflex  - CBC with platelets differential  - Creatinine     Follow-up: yearly, sooner as needed         Scribe Disclosure:   I, Odalis Sifuentes, am serving as a scribe to document services personally performed by Cherie Rose MD at this visit, based upon the provider's statements to me. All documentation has been reviewed by the aforementioned provider prior to being entered into the official medical record.     Portions of this medical record were completed by a scribe. UPON MY REVIEW AND AUTHENTICATION BY ELECTRONIC SIGNATURE, this confirms (a) I performed the applicable clinical services, and (b) the record is accurate.     Cherie Rose

## 2018-12-13 ENCOUNTER — TELEPHONE (OUTPATIENT)
Dept: INTERNAL MEDICINE | Facility: CLINIC | Age: 43
End: 2018-12-13

## 2018-12-13 NOTE — TELEPHONE ENCOUNTER
"Message left for that patient that her lab results would have went to her my chart account as she is \"active\" on my chart.    Results printed off and mailed out to patient.    HUGH CASTILLO at 11:47 AM on 12/13/2018.    "

## 2018-12-13 NOTE — TELEPHONE ENCOUNTER
[facility] Call Center    Phone Message    May a detailed message be left on voicemail: yes    Reason for Call: Other: Pt was lab results would be mailed out to her home. pt was wondering if results are available yet bc she have not recieved it in the mail.      Action Taken: Message routed to:  [facility] FREDDIE

## 2019-05-03 ENCOUNTER — TRANSFERRED RECORDS (OUTPATIENT)
Dept: HEALTH INFORMATION MANAGEMENT | Facility: CLINIC | Age: 44
End: 2019-05-03

## 2019-06-03 ENCOUNTER — TRANSFERRED RECORDS (OUTPATIENT)
Dept: HEALTH INFORMATION MANAGEMENT | Facility: CLINIC | Age: 44
End: 2019-06-03

## 2019-06-10 ENCOUNTER — OFFICE VISIT (OUTPATIENT)
Dept: FAMILY MEDICINE | Facility: CLINIC | Age: 44
End: 2019-06-10
Payer: COMMERCIAL

## 2019-06-10 VITALS
HEART RATE: 71 BPM | HEIGHT: 67 IN | SYSTOLIC BLOOD PRESSURE: 113 MMHG | BODY MASS INDEX: 21.11 KG/M2 | DIASTOLIC BLOOD PRESSURE: 72 MMHG | OXYGEN SATURATION: 98 % | WEIGHT: 134.5 LBS

## 2019-06-10 DIAGNOSIS — M25.511 RIGHT SHOULDER PAIN, UNSPECIFIED CHRONICITY: Primary | ICD-10-CM

## 2019-06-10 RX ORDER — DOXYCYCLINE HYCLATE 50 MG/1
CAPSULE ORAL
Refills: 3 | COMMUNITY
Start: 2019-05-22 | End: 2022-07-06

## 2019-06-10 RX ORDER — TRETINOIN 0.25 MG/G
CREAM TOPICAL
Refills: 11 | COMMUNITY
Start: 2018-10-31 | End: 2023-09-29

## 2019-06-10 ASSESSMENT — PAIN SCALES - GENERAL: PAINLEVEL: MILD PAIN (3)

## 2019-06-10 ASSESSMENT — MIFFLIN-ST. JEOR: SCORE: 1279.78

## 2019-06-10 NOTE — NURSING NOTE
"43 year old  Chief Complaint   Patient presents with     Shoulder Pain     Pt is having right shoulder pain x2 months     Pain     Pt has pain on right ear, jaw and discomfort on throat x3 weeks        Blood pressure 113/72, pulse 71, height 1.689 m (5' 6.5\"), weight 61 kg (134 lb 8 oz), last menstrual period 2019, SpO2 98 %, not currently breastfeeding. Body mass index is 21.38 kg/m .  BP completed using cuff size:    Patient Active Problem List   Diagnosis     Dyslipidemia, goal LDL below 160       Wt Readings from Last 2 Encounters:   06/10/19 61 kg (134 lb 8 oz)   18 60.3 kg (133 lb)     BP Readings from Last 3 Encounters:   06/10/19 113/72   18 104/76   18 113/78       Allergies   Allergen Reactions     Augmentin Rash       Current Outpatient Medications   Medication     acyclovir (ZOVIRAX) 200 MG capsule     doxycycline (VIBRAMYCIN) 100 MG capsule     doxycycline hyclate (VIBRAMYCIN) 50 MG capsule     tretinoin (RETIN-A) 0.025 % external cream     No current facility-administered medications for this visit.        Social History     Tobacco Use     Smoking status: Former Smoker     Last attempt to quit: 2002     Years since quittin.3     Smokeless tobacco: Never Used   Substance Use Topics     Alcohol use: Yes     Comment: rarely     Drug use: No       Honoring Choices - Health Care Directive Guide offered to patient at time of visit.    Health Maintenance Due   Topic Date Due     HIV SCREENING  10/21/1988     DTAP/TDAP/TD IMMUNIZATION (2 - Td) 2018     LIPID  10/21/2018       Immunization History   Administered Date(s) Administered     Influenza (IIV3) PF 2012     Tdap (Adacel,Boostrix) 2008       Lab Results   Component Value Date    PAP NIL 2017         Recent Labs   Lab Test 18  1732 10/08/12  1024 12   LDL  --   --  125  --  129   HDL  --   --  68  --  76   TRIG  --   --  70  --  252   ALT  --   --  9  --  18   CR 0.75 0.65 " 0.76  --  0.67   GFRESTIMATED 83 >90 100   < >  --    GFRESTBLACK >90 >90 115   < >  --    POTASSIUM  --  4.6 4.0  --  3.9   TSH 2.07  --   --   --   --     < > = values in this interval not displayed.       PHQ-2 ( 1999 Pfizer) 6/10/2019 10/8/2012   Q1: Little interest or pleasure in doing things 0 0   Q2: Feeling down, depressed or hopeless 0 0   PHQ-2 Score 0 0       PHQ-9 SCORE 3/8/2018 4/12/2018 5/10/2018 5/24/2018   PHQ-9 Total Score 3 1 8 5       GABRIEL-7 SCORE 3/1/2018 5/10/2018 5/24/2018   Total Score 9 10 9       No flowsheet data found.      Kamilla Fonseca, [facility]  Olga 10, 2019 5:03 PM

## 2019-06-10 NOTE — PROGRESS NOTES
"SUBJECTIVE:  Joyce Murry is a 43 year old female presents for   Chief Complaint   Patient presents with     Shoulder Pain     Pt is having right shoulder pain x2 months     Pain     Pt has pain on right ear, jaw and discomfort on throat x3 weeks         HPI  Pt has R shoulder and neck pain for the last 3 weeks that radiates to her R ear. She denies injury. States she does work out often and states it could be something with overuse, but not sure. No home treatment has been done. She has no trouble with her ADLs. Pain is mild and intermittent. Certain movements make it a bit worse such as lifting or reaching over head. No recent fevers or illnesses. No hx of msk problems in the past. No other concerns today. She is interested in alternative treatments as well such as chiropractic or acupuncture.    Review Of Systems  Skin: negative  Ears/Nose/Throat: negative, as above  Respiratory: No shortness of breath, dyspnea on exertion, cough, or hemoptysis  Cardiovascular: negative  Musculoskeletal: as above    Medications and allergies were reviewed by me today.   PMH/PSH and Social History Reviewed per EMR.    Current Medications  Current Outpatient Medications   Medication Sig Dispense Refill     acyclovir (ZOVIRAX) 200 MG capsule Take 2 capsules by mouth every 8 hours       doxycycline (VIBRAMYCIN) 100 MG capsule Take by mouth daily       doxycycline hyclate (VIBRAMYCIN) 50 MG capsule TK ONE C PO QD  3     tretinoin (RETIN-A) 0.025 % external cream CYNTHIA A PEA SIZED AMT TO FACE NIGHTLY AS TOLERATED. RECOMMENDED TO START SLOWLY  11       Allergies  Allergies   Allergen Reactions     Augmentin Rash         OBJECTIVE:    Physical Exam  /72   Pulse 71   Ht 1.689 m (5' 6.5\")   Wt 61 kg (134 lb 8 oz)   LMP 05/26/2019 (Approximate)   SpO2 98%   Breastfeeding? No   BMI 21.38 kg/m      Exam:  Constitutional: healthy, alert and no distress  Head: Normocephalic. No masses, lesions, tenderness or abnormalities  Neck: " Neck supple. No adenopathy. Thyroid symmetric, normal size,, Carotids without bruits.  ENT: ENT exam normal, no neck nodes or sinus tenderness  Cardiovascular: negative, PMI normal. No lifts, heaves, or thrills. RRR. No murmurs, clicks gallops or rub  Respiratory: negative, Good diaphragmatic excursion. Lungs clear  Musculoskeletal:R neck and arm with full ROM and normal appearance, some mild pain through raising arm over head noted. extremities normal- no gross deformities noted, gait normal and normal muscle tone  Skin: no suspicious lesions or rashes  Neurologic: Gait normal. Reflexes normal and symmetric. Sensation grossly WNL.  Psychiatric: mentation appears normal and affect normal/bright  Hematologic/Lymphatic/Immunologic: Normal cervical lymph nodes      ASSESSMENT/PLAN:    R shoulder pain and R neck pain - normal physical exam today other than some pain through certain movements, recommend further evaluation through chiropractic and acupuncture care  @ [facility] in [place], [phone] recommend Dr. Tony as he does chiro and acupuncture. Also recommend daily turmeric 500-1000 mg daily to help with any inflammation.  Also recommend massage therapy. Follow up if symptoms become worse despite this treatment.       Iona Salgado DNP, APRN, CNP

## 2019-06-10 NOTE — PATIENT INSTRUCTIONS
R shoulder pain and R neck pain - normal physical exam today other than some pain through certain movements, recommend further evaluation through chiropractic and acupuncture care  [facility] in [place], [phone] recommend Dr. oTny as he does chiro and acupuncture. Also recommend daily turmeric 500-1000 mg daily to help with any inflammation.  Also recommend massage therapy. Follow up if symptoms become worse despite this treatment.       [facility] Medication Refill Request Information:  * Please contact your pharmacy regarding ANY request for medication refills.  ** [facility] Prescription Fax = [phone]  * Please allow 3 business days for routine medication refills.  * Please allow 5 business days for controlled substance medication refills.     [facility] Test Result notification information:  *You will be notified with in 7-10 days of your appointment day regarding the results of your test.  If you are on MyChart you will be notified as soon as the provider has reviewed the results and signed off on them.    [facility]: [phone]

## 2019-07-31 ENCOUNTER — OFFICE VISIT (OUTPATIENT)
Dept: INTERNAL MEDICINE | Facility: CLINIC | Age: 44
End: 2019-07-31
Payer: COMMERCIAL

## 2019-07-31 VITALS
HEIGHT: 66 IN | HEART RATE: 77 BPM | WEIGHT: 131.6 LBS | SYSTOLIC BLOOD PRESSURE: 86 MMHG | BODY MASS INDEX: 21.15 KG/M2 | OXYGEN SATURATION: 98 % | TEMPERATURE: 97.8 F | DIASTOLIC BLOOD PRESSURE: 55 MMHG

## 2019-07-31 DIAGNOSIS — G43.829 MENSTRUAL MIGRAINE WITHOUT STATUS MIGRAINOSUS, NOT INTRACTABLE: ICD-10-CM

## 2019-07-31 DIAGNOSIS — M54.2 NECK PAIN: ICD-10-CM

## 2019-07-31 DIAGNOSIS — F41.9 ANXIETY: Primary | ICD-10-CM

## 2019-07-31 DIAGNOSIS — M75.41 ROTATOR CUFF IMPINGEMENT SYNDROME OF RIGHT SHOULDER: ICD-10-CM

## 2019-07-31 RX ORDER — ALPRAZOLAM 0.25 MG
.25-.5 TABLET ORAL 2 TIMES DAILY PRN
Qty: 10 TABLET | Refills: 0 | Status: SHIPPED | OUTPATIENT
Start: 2019-07-31 | End: 2023-09-29

## 2019-07-31 RX ORDER — TRETINOIN 1 MG/G
CREAM TOPICAL
Refills: 11 | COMMUNITY
Start: 2019-05-15

## 2019-07-31 ASSESSMENT — MIFFLIN-ST. JEOR: SCORE: 1264.68

## 2019-07-31 ASSESSMENT — PAIN SCALES - GENERAL: PAINLEVEL: NO PAIN (0)

## 2019-07-31 NOTE — PROGRESS NOTES
[facility]  Primary Care Center   Odalis Pearson MD  07/31/2019      Chief Complaint:   Establish Care and Shoulder Pain       History of Present Illness:   Joyce Murry is a 43 year old female who presents to [facility] and for evaluation of shoulder pain. She was previously seeing Dr. Rose, but since she is retiring, Joyce now needs a new PCP. At the end of April, she was on vacation in [place] when she tripped and fell, and landed on her right shoulder. She did not notice any problems right away, but was in extreme pain by the next day. For the weeks afterward, she had severe pain in her right shoulder radiating over her deltoid muscle and could not move her arm. She believes she took some Ibuprofen at first, but is unsure. She went to see Dr. Whitehead at [facility] in [place] and he had XRays done and suggested she try cortisone injections. Since then, she was seen by an NP here at [facility], who recommended she see a chiropractor and try taking new vitamins. She has not been exercising as much because she has been travelling, but she still has pain and discomfort with certain exercises and movements.     Anxiety:  She reports that she does have anxiety, but she does not currently take any medication nor does she want to. She will be travelling to [place] in August with her , and she has started to become anxious about leaving her daughters at home. They will be staying with her in-laws for the duration of the trip, which she feels okay about but is still worried. She is wondering if she could have a renewed prescription for Xanax, which has helped with anxious flying in the past. She does have a history of panic attacks, but has not had one in the last 20 years. She has seen a number of therapists in the past, and she is currently taking a break from her most recent therapist. She reports that her therapist gave her great tools, and they made progress together, but she was beginning to  get anxious about going to the appointments themselves.     Jaw & Neck Pain:  She has been having slight jaw and neck pain. She went to see her dentist and was told there were no acute problems, but if her pain worsens he suggested to look into the possibility of TMJ. She now has a mild sore throat and has noticed a clicking sensation when she swallows. She denies ear pain or difficulty swallowing.    Worsening Menstrual Symptoms:  Joyce reports that she has been getting migraines 1-2 days before and/or after her period for a number of years. She treats these with Excedrin and has tried acupuncture in the past. Additionally, she has noticed she is more irritable, has worsening PMS symptoms, and has very low lipido around her cycle. She is not currently taking birth control or hormone replacement, does not have an IUD, and has fairly regular periods.     Review of Systems:   A full 10-pt Review of Systems was performed, verified and is negative except as documented in the HPI.  All health questionnaires were reviewed, verified and relevant information documented above.    Active Medications:      acyclovir (ZOVIRAX) 200 MG capsule, Take 2 capsules by mouth every 8 hours, Disp: , Rfl:      ALPRAZolam (XANAX) 0.25 MG tablet, Take 1-2 tablets (0.25-0.5 mg) by mouth 2 times daily as needed for anxiety, Disp: 10 tablet, Rfl: 0     doxycycline hyclate (VIBRAMYCIN) 50 MG capsule, TK ONE C PO QD, Disp: , Rfl: 3     tretinoin (RETIN-A) 0.1 % external cream, APPLY PEA SIZE AMOUNT TO FACE QHS AS TOLERATED, Disp: , Rfl: 11     doxycycline (VIBRAMYCIN) 100 MG capsule, Take by mouth daily, Disp: , Rfl:      tretinoin (RETIN-A) 0.025 % external cream, CYNTHIA A PEA SIZED AMT TO FACE NIGHTLY AS TOLERATED. RECOMMENDED TO START SLOWLY, Disp: , Rfl: 11      Allergies:   Augmentin      Past Medical History:  Genital herpes  Hyperlipidemia  Dyslipidemia     Past Surgical History:  Dilation and curettage - 2010  [place] teeth  "extraction    Family History:   Pulmonary hypertension - father  Coronary artery disease - father  Diabetes mellitus type 2 - father  Breast cancer - mother, paternal aunt  lung cancer - maternal grandfather   Alzheimer's disease  Type 1 diabetes - mother   CLL - mother       Social History:   The patient is , a former smoker (quit 2/20/2002), and does rarely consume alcohol.      Physical Exam:   BP (!) 86/55 (BP Location: Right arm, Patient Position: Sitting, Cuff Size: Adult Regular)   Pulse 77   Temp 97.8  F (36.6  C) (Oral)   Ht 1.686 m (5' 6.38\")   Wt 59.7 kg (131 lb 9.6 oz)   SpO2 98%   BMI 21.00 kg/m     Constitutional: Alert, oriented, pleasant, no acute distress  Head: Normocephalic, atraumatic  Eyes: Extra-ocular movements intact, no scleral icterus  ENT: Oropharynx clear, moist mucus membranes, good dentition, right TM clear, mild clicking over TMJs bilaterally  Neck: Supple, no lymphadenopathy, no thyromegaly   Musculoskeletal: No edema, normal muscle tone, normal gait, right shoulder without point tenderness, range of motion intact in all directions, no weakness, pain with impingement testing  Neurologic: Alert and oriented, cranial nerves 2-12 intact.  Psychiatric: normal mentation, affect and mood     Assessment and Plan:  Anxiety  She requests medication for upcoming airline travel given history of panic attacks. She also reports significant daily anxiety which has been managed with therapy. She expressed interest in making another appointment to discuss medications.   - ALPRAZolam (XANAX) 0.25 MG tablet  Dispense: 10 tablet; Refill: 0    Rotator cuff impingement syndrome of right shoulder  Exam is consistent with rotator cuff tendonopathy. Discussed options for management, I would recommend PT and a short course of NSAIDS. If it doesn't improve would consider injection or possibly imaging.  - PHYSICAL THERAPY REFERRAL    Neck pain  I dont appreciate any abnormality on exam other than " possible TMJ dysfuntion. Discussed conservatie management.    Menstrual migraine without status migrainosus, not intractable  Patient is not interested in hormones, discussed abortive therapy for migraine management.        Follow-up: No follow-ups on file.         Scribe Disclosure:  I, Ca Valenzuela, am serving as a scribe to document services personally performed by Odalis Pearson MD at this visit, based upon the provider's statements to me. All documentation has been reviewed by the aforementioned provider prior to being entered into the official medical record.     Portions of this medical record were completed by a scribe. UPON MY REVIEW AND AUTHENTICATION BY ELECTRONIC SIGNATURE, this confirms (a) I performed the applicable clinical services, and (b) the record is accurate.   Odalis Pearson MD  Internal Medicine    40 min spent face to face, of which >50% time spent on counseling/coordinating care exclusive of any procedure time

## 2019-07-31 NOTE — NURSING NOTE
Chief Complaint   Patient presents with     Establish Care     pt here to establish care     Shoulder Pain     pt states she is having pain in right shoulder       Belinda Fuller CMA at 1:53 PM on 7/31/2019.

## 2019-07-31 NOTE — PATIENT INSTRUCTIONS
[facility] Medication Refill Request Information:  * Please contact your pharmacy regarding ANY request for medication refills.  ** [facility] Prescription Fax = [phone]  * Please allow 3 business days for routine medication refills.  * Please allow 5 business days for controlled substance medication refills.     [facility] Test Result notification information:  *You will be notified with in 7-10 days of your appointment day regarding the results of your test.  If you are on MyChart you will be notified as soon as the provider has reviewed the results and signed off on them.    [facility]: [phone]

## 2019-08-13 ENCOUNTER — THERAPY VISIT (OUTPATIENT)
Dept: PHYSICAL THERAPY | Facility: CLINIC | Age: 44
End: 2019-08-13
Attending: INTERNAL MEDICINE
Payer: COMMERCIAL

## 2019-08-13 DIAGNOSIS — M25.511 ACUTE PAIN OF RIGHT SHOULDER: ICD-10-CM

## 2019-08-13 DIAGNOSIS — M75.41 ROTATOR CUFF IMPINGEMENT SYNDROME OF RIGHT SHOULDER: ICD-10-CM

## 2019-08-13 PROCEDURE — 97110 THERAPEUTIC EXERCISES: CPT | Mod: GP | Performed by: PHYSICAL THERAPIST

## 2019-08-13 PROCEDURE — 97161 PT EVAL LOW COMPLEX 20 MIN: CPT | Mod: GP | Performed by: PHYSICAL THERAPIST

## 2019-08-13 NOTE — PROGRESS NOTES
"[facility] Initial Evaluation  Subjective:  Joyce Murry is a 43 year old female.    Patient s chief complaints: R shoulder pain.    Condition occurred due to a fall while traveling in [place].  Did notice some pain in R shoulder with weight lifting prior. .  Date of Onset: 4/23/19  Location of symptoms is posterior shoulder, anterior shoulder to clavicular/pec area and down the upper arm.  Denies pain below the elbow and \"not really\" up in the neck.  .  Symptoms other than pain include: stiffness   Quality of pain is sharp/shooting and frequency is intermittent (no pain at rest, just certain movements and positions).    Pain dependence on time of day is: not dependent on time of day, just a little stiff when waking and going to move.   Pain rating is: 3/10.    Symptoms are exacerbated by: push up, bench press (when lowering into habd), lateral raise with elbow flexed, also moving the arm after sleeping, sometimes reaching overhead, pouring milk (raised arm with bent elbow).    Symptoms are relieved by:  Nothing specific.    Progression of symptoms is that symptoms are:  Improved from initial, but progress slowed.  Imaging/Special tests include: x-ray normal, type II acromion  Previous treatments include: none.   Patient reports that general health is: excellent.   Pertinent medical history includes:  Gestational diabetes, migraine/HA, no longer smoking bu tin past.    Medical allergies includes: augmentin.   Surgical history includes: none.  Current medications include: doxycycline  Current occupation is: self employed, interior design, some web based  Work status is: working normal job  Primary job tasks include: prolonged sitting/computer work  Barriers include: none  Red flags include: none    Patient's expectations for therapy include: be able to resume weight lifting, restore normal movement, be able to exercise unrestricted.  Kick boxing as well.     HPI                "     Objective:  SHOULDER:    Cervical Screen: mild stiffness R SB, otherwise WNL and not reproductive of shoulder pain. Rotation ROM improves after repeated retraction, appears more shoulder than neck, but will include for neck stiffness.       Shoulder:   PROM L PROM R AROM L AROM R MMT L MMT R   Flex   WNL WNL 5 5   Abd   WNL +110 up 5 5 with trace pain   Full Can     5 5   Empty Can     5 5   IR   WNL WNL 5 5   ER   WNL WNL 4 4   Ext/IR   T4 T7 with pain     Extension stiff R vs L.   Repeated motions: repeated wand ext x 10 reps just stretch during and re check of abduction after is with more free motion with less pain (Still some pain, just less).  Lunge palm up on table into extension.  Same response.  Will initiate repeated extension.     Scapulothoraic Rhythm: fairly symmetric. Intermittently hikes a bit and has intermittent inferomedial border winging.     Palpation: not tender to palpation    Special tests:   L R   Impingement     Neer's  positive   Hawkin's-Pavel  Positive ++   Coracoid Impingement  Trace positive (Adduction not as painful, but IR is)   Internal impingement     Labral     Anterior Slide  negative   Altheimer's  Trace positive, pain thumb down, less pain palm up, no click   Crank  Negative through mid range rotation, no click--notes she would have more pain with further ER       System    Physical Exam    General     ROS    Assessment/Plan:    Patient is a 43 year old female with right side shoulder complaints.    Patient has the following significant findings with corresponding treatment plan.                Diagnosis 1:  R shoulder impingement, some mechanical stiffness and sharp pain in certain positions    Pain -  hot/cold therapy, manual therapy and directional preference exercise  Decreased ROM/flexibility - manual therapy and therapeutic exercise  Decreased strength - therapeutic exercise and therapeutic activities  Decreased proprioception - neuro re-education and therapeutic  activities  Decreased function - therapeutic activities    Therapy Evaluation Codes:   1) History comprised of:   Personal factors that impact the plan of care:      None.    Comorbidity factors that impact the plan of care are:      None.     Medications impacting care: None.  2) Examination of Body Systems comprised of:   Body structures and functions that impact the plan of care:      Shoulder.   Activity limitations that impact the plan of care are:      Dressing, Lifting, Sports, Sleeping, Laying down and reaching overhead, weight lifting.  3) Clinical presentation characteristics are:   Stable/Uncomplicated.  4) Decision-Making    Low complexity using standardized patient assessment instrument and/or measureable assessment of functional outcome.  Cumulative Therapy Evaluation is: Low complexity.    Previous and current functional limitations:  (See Goal Flow Sheet for this information)    Short term and Long term goals: (See Goal Flow Sheet for this information)     Communication ability:  Patient appears to be able to clearly communicate and understand verbal and written communication and follow directions correctly.  Treatment Explanation - The following has been discussed with the patient:   RX ordered/plan of care  Anticipated outcomes  Possible risks and side effects  This patient would benefit from PT intervention to resume normal activities.   Rehab potential is good.    Frequency:  1 X week, once daily  Duration:  for 6 weeks  Discharge Plan:  Achieve all LTG.  Independent in home treatment program.  Reach maximal therapeutic benefit.    Please refer to the daily flowsheet for treatment today, total treatment time and time spent performing 1:1 timed codes.

## 2019-09-18 ENCOUNTER — DOCUMENTATION ONLY (OUTPATIENT)
Dept: INTERNAL MEDICINE | Facility: CLINIC | Age: 44
End: 2019-09-18

## 2019-10-24 ENCOUNTER — PRE VISIT (OUTPATIENT)
Dept: INTERNAL MEDICINE | Facility: CLINIC | Age: 44
End: 2019-10-24

## 2019-10-30 ENCOUNTER — OFFICE VISIT (OUTPATIENT)
Dept: INTERNAL MEDICINE | Facility: CLINIC | Age: 44
End: 2019-10-30
Payer: COMMERCIAL

## 2019-10-30 VITALS
SYSTOLIC BLOOD PRESSURE: 104 MMHG | BODY MASS INDEX: 21.72 KG/M2 | HEART RATE: 66 BPM | OXYGEN SATURATION: 100 % | TEMPERATURE: 97.8 F | DIASTOLIC BLOOD PRESSURE: 67 MMHG | WEIGHT: 136.1 LBS

## 2019-10-30 DIAGNOSIS — F32.0 CURRENT MILD EPISODE OF MAJOR DEPRESSIVE DISORDER WITHOUT PRIOR EPISODE (H): ICD-10-CM

## 2019-10-30 DIAGNOSIS — N64.4 MASTODYNIA: ICD-10-CM

## 2019-10-30 DIAGNOSIS — F41.1 GENERALIZED ANXIETY DISORDER: Primary | ICD-10-CM

## 2019-10-30 DIAGNOSIS — F32.81 PREMENSTRUAL DYSPHORIC DISORDER: ICD-10-CM

## 2019-10-30 RX ORDER — ESCITALOPRAM OXALATE 5 MG/1
5 TABLET ORAL DAILY
Qty: 60 TABLET | Refills: 1 | Status: SHIPPED | OUTPATIENT
Start: 2019-10-30 | End: 2022-07-06

## 2019-10-30 ASSESSMENT — PAIN SCALES - GENERAL: PAINLEVEL: NO PAIN (0)

## 2019-10-30 ASSESSMENT — ANXIETY QUESTIONNAIRES
GAD7 TOTAL SCORE: 13
3. WORRYING TOO MUCH ABOUT DIFFERENT THINGS: NEARLY EVERY DAY
IF YOU CHECKED OFF ANY PROBLEMS ON THIS QUESTIONNAIRE, HOW DIFFICULT HAVE THESE PROBLEMS MADE IT FOR YOU TO DO YOUR WORK, TAKE CARE OF THINGS AT HOME, OR GET ALONG WITH OTHER PEOPLE: SOMEWHAT DIFFICULT
5. BEING SO RESTLESS THAT IT IS HARD TO SIT STILL: NOT AT ALL
1. FEELING NERVOUS, ANXIOUS, OR ON EDGE: NEARLY EVERY DAY
7. FEELING AFRAID AS IF SOMETHING AWFUL MIGHT HAPPEN: MORE THAN HALF THE DAYS
6. BECOMING EASILY ANNOYED OR IRRITABLE: NEARLY EVERY DAY
2. NOT BEING ABLE TO STOP OR CONTROL WORRYING: MORE THAN HALF THE DAYS

## 2019-10-30 ASSESSMENT — PATIENT HEALTH QUESTIONNAIRE - PHQ9
SUM OF ALL RESPONSES TO PHQ QUESTIONS 1-9: 12
5. POOR APPETITE OR OVEREATING: NOT AT ALL

## 2019-10-30 NOTE — PATIENT INSTRUCTIONS
[facility] Medication Refill Request Information:  * Please contact your pharmacy regarding ANY request for medication refills.  ** [facility] Prescription Fax = [phone]  * Please allow 3 business days for routine medication refills.  * Please allow 5 business days for controlled substance medication refills.     [facility] Test Result notification information:  *You will be notified with in 7-10 days of your appointment day regarding the results of your test.  If you are on MyChart you will be notified as soon as the provider has reviewed the results and signed off on them.    [facility]: [phone]

## 2019-10-30 NOTE — NURSING NOTE
Chief Complaint   Patient presents with     Anxiety     pt here to discuss anxiety     Medication Request     pt would like to discuss medications       Belinda Fuller CMA at 10:04 AM on 10/30/2019.

## 2019-10-30 NOTE — PROGRESS NOTES
[facility]  Primary Care Center   Odalis Pearson MD  10/30/2019      Chief Complaint:   No chief complaint on file.       History of Present Illness:   Joyce Murry is a 44 year old female with a history of menstrual migraines and anxiety who presents for evaluation of ***.    Other topics discussed:  1.***    Review of Systems:   Pertinent items are noted in HPI, remainder of complete ROS is negative.      Active Medications:     Current Outpatient Medications:      acyclovir (ZOVIRAX) 200 MG capsule, Take 2 capsules by mouth every 8 hours, Disp: , Rfl:      ALPRAZolam (XANAX) 0.25 MG tablet, Take 1-2 tablets (0.25-0.5 mg) by mouth 2 times daily as needed for anxiety, Disp: 10 tablet, Rfl: 0     doxycycline (VIBRAMYCIN) 100 MG capsule, Take by mouth daily, Disp: , Rfl:      doxycycline hyclate (VIBRAMYCIN) 50 MG capsule, TK ONE C PO QD, Disp: , Rfl: 3     tretinoin (RETIN-A) 0.025 % external cream, CYNTHIA A PEA SIZED AMT TO FACE NIGHTLY AS TOLERATED. RECOMMENDED TO START SLOWLY, Disp: , Rfl: 11     tretinoin (RETIN-A) 0.1 % external cream, APPLY PEA SIZE AMOUNT TO FACE QHS AS TOLERATED, Disp: , Rfl: 11      Allergies:   Augmentin      Past Medical History:  Anxiety  Genital herpes  Menstrual migraines  Dyslipidemia       Past Surgical History:  Dilatation and curettage  Colonoscopy  [place] teeth     Family History:   Father: hypertension, coronary artery disease, type 2 diabetes mellitus  Mother: breast cancer, type 1 diabetes mellitus, CLL  Maternal grandfather: lung cancer  Paternal aunt: breast cancer      Social History:     Former smoker, quit 2002    Physical Exam:   There were no vitals taken for this visit.   ***   Assessment and Plan:  There are no diagnoses linked to this encounter.        Follow-up: No follow-ups on file.       ***    Scribe Preparation Attestation:  Abad EDMOND, served as a scribe preparing the chart for the clinic encounter through chart review for the provider team.      Portions of this medical record were completed by a scribe. UPON MY REVIEW AND AUTHENTICATION BY ELECTRONIC SIGNATURE, this confirms (a) I performed the applicable clinical services, and (b) the record is accurate.

## 2019-10-30 NOTE — PROGRESS NOTES
"Ms. Murry is a 44 year old female here to discuss anxiety.     History of Present Illness:  Here to follow up anxiety. Has not been on treatment before.  Stopped seeing therapist b/c felt she hit a \"wall\". Wasn't making progress.  Has documented her fears and anxieties: Fear around leaving kids.  Did exposure therapy with therapist.  Fear of undetected illness.  Stems from getting HSV from earlier partner.  Often wakes up early in morning with stress/panic.  Has premenstrual dysphoric disorder as well.  Reports trouble making decisions, low self-esteem.  Affects relationships and social situations.    PHQ9 = 12  GABRIEL= 13    Also endorses breast pain bilaterally in various locations. Sometimes associated with exercise or menses but not always.      A full 10-pt Review of Systems was performed, verified and is negative except as documented in the HPI.  All health questionnaires were reviewed, verified and relevant information documented above.      Past Medical History:  Past Medical History:   Diagnosis Date     Anxiety      Genital herpes      History of hyperlipidemia, improved as of  with diet and exercise      Menstrual migraine        Active Meds:  Current Outpatient Medications   Medication     acyclovir (ZOVIRAX) 200 MG capsule     ALPRAZolam (XANAX) 0.25 MG tablet     doxycycline (VIBRAMYCIN) 100 MG capsule     doxycycline hyclate (VIBRAMYCIN) 50 MG capsule     escitalopram (LEXAPRO) 5 MG tablet     tretinoin (RETIN-A) 0.025 % external cream     tretinoin (RETIN-A) 0.1 % external cream     No current facility-administered medications for this visit.         Allergies:  Reviewed, refer to EMR    Relevant Social History:  Works from home  Has 2 daughters  Social History     Tobacco Use     Smoking status: Former Smoker     Last attempt to quit: 2002     Years since quittin.7     Smokeless tobacco: Never Used   Substance Use Topics     Alcohol use: Yes     Comment: rarely     Drug use: No "         Physical Exam:  Vitals: /67 (BP Location: Right arm, Patient Position: Sitting, Cuff Size: Adult Regular)   Pulse 66   Temp 97.8  F (36.6  C) (Oral)   Wt 61.7 kg (136 lb 1.6 oz)   SpO2 100%   BMI 21.72 kg/m    Constitutional: Alert, oriented, pleasant, no acute distress  Head: Normocephalic, atraumatic  Eyes: Extra-ocular movements intact, pupils equally round and reactive bilaterally, no scleral icterus  Breasts: normal without suspicious masses, skin changes or axillary nodes  Musculoskeletal: No edema, normal muscle tone, normal gait  Neurologic: Alert and oriented, cranial nerves 2-12 intact  Psychiatric: normal mentation, mildly anxious affect and mood        Assessment and Plan:    Joyce was seen today for anxiety and medication request.    Diagnoses and all orders for this visit:    Generalized anxiety disorder  Current mild episode of major depressive disorder without prior episode (H)  Premenstrual dysphoric disorder  Discussed pros/cons of medication. Given she has pervasive symptoms for many years now and has already tried therapy, I recommend a trial of serotonin specific reuptake inhibitor given relatively low risk.  Possible side effects discussed.  -     escitalopram (LEXAPRO) 5 MG tablet; Take 1 tablet (5 mg) by mouth daily If tolerating, increase to 10 mg (2 tabs) after 2 weeks    Mastodynia  Normal breast exam. Given bilateral and transient nature of symptoms, most likely benign etiology. Discussed strategies for management. Patient is up to date on mammograms.        Return to clinic: 4-6 weeks    Odalis Pearson MD  Internal Medicine      25 min spent face to face, of which >50% time spent on counseling/coordinating care exclusive of any procedure time

## 2019-10-31 ASSESSMENT — ANXIETY QUESTIONNAIRES: GAD7 TOTAL SCORE: 13

## 2019-11-01 ENCOUNTER — TELEPHONE (OUTPATIENT)
Dept: INTERNAL MEDICINE | Facility: CLINIC | Age: 44
End: 2019-11-01

## 2019-11-01 NOTE — TELEPHONE ENCOUNTER
[facility] Call Center    Phone Message    May a detailed message be left on voicemail: yes    Reason for Call: Medication Question or concern regarding medication   Prescription Clarification  Name of Medication: escitalopram (LEXAPRO) 5 MG tablet  Prescribing Provider: Dr. Pearson   Pharmacy: [facility]   What on the order needs clarification? Pt called and her pharmacy stated they do not have this script. I informed the pt of the note in the chart that the pharmacy confirmed the receipt of it, but the pt called them and they do not have it. Pt would like to get it today for the weekend          Action Taken: Message routed to:  [facility]

## 2019-11-01 NOTE — TELEPHONE ENCOUNTER
Called pharmacy to see if Rx was ready at pharmacy. Pharmacy states that Rx was ready for . Asked pharmacy to call patient to let patient know that Rx was ready for .    Spoke to patient to relay that Rx was sent and was ready for  and that pharmacy should be calling patient to inform her that Rx was ready. Pharmacy was calling as writer was on the phone with the patient. Lisa Kaminski LPN 11/1/2019 1:12 PM

## 2019-11-07 ENCOUNTER — HEALTH MAINTENANCE LETTER (OUTPATIENT)
Age: 44
End: 2019-11-07

## 2020-01-07 ENCOUNTER — TELEPHONE (OUTPATIENT)
Dept: INTERNAL MEDICINE | Facility: CLINIC | Age: 45
End: 2020-01-07

## 2020-01-07 NOTE — TELEPHONE ENCOUNTER
Health Call Center    Phone Message    May a detailed message be left on voicemail: yes    Reason for Call: Other: Joyce calling to report that she has not started taking the medication as of yet.  She states she wanted to let Dr. Pearson know this as she has had to cancel her last two appointments with her and Joyce didn't want Dr. Pearson to think that she was taking the medication without following up,  This is an FYI only     Action Taken: Message routed to:  [facility]

## 2020-01-22 ENCOUNTER — FCC EXTENDED DOCUMENTATION (OUTPATIENT)
Dept: PSYCHOLOGY | Facility: CLINIC | Age: 45
End: 2020-01-22

## 2020-01-22 NOTE — PROGRESS NOTES
Discharge Summary  Multiple Sessions    Client Name: Joyce Murry MRN#: [id number] YOB: 1975      Intake / Discharge Date: 1/22/2020      DSM5 Diagnoses: (Sustained by DSM5 Criteria Listed Above)  Diagnoses: 300.02 (F41.1) Generalized Anxiety Disorder  Psychosocial & Contextual Factors: Marital discord  WHODAS 2.0 (12 item) Score: See EPIC          Presenting Concern:  Marital discord.      Reason for Discharge:  Goals completed      Disposition at Time of Last Encounter:   Comments:   N/A     Risk Management:   Client denies a history of suicidal ideation, suicide attempts, self-injurious behavior, homicidal ideation, homicidal behavior and and other safety concerns  Recommended that patient call 911 or go to the local ED should there be a change in any of these risk factors.      Referred To:  N/A        Yareli Walker   1/22/2020

## 2020-02-23 ENCOUNTER — HEALTH MAINTENANCE LETTER (OUTPATIENT)
Age: 45
End: 2020-02-23

## 2020-04-21 PROBLEM — M25.511 ACUTE PAIN OF RIGHT SHOULDER: Status: RESOLVED | Noted: 2019-08-13 | Resolved: 2020-04-21

## 2020-06-03 ENCOUNTER — TELEPHONE (OUTPATIENT)
Dept: INTERNAL MEDICINE | Facility: CLINIC | Age: 45
End: 2020-06-03

## 2020-06-03 DIAGNOSIS — N64.4 MASTODYNIA: Primary | ICD-10-CM

## 2020-06-03 NOTE — TELEPHONE ENCOUNTER
"[facility] Call Center    Phone Message    May a detailed message be left on voicemail: yes     Reason for Call: Other: patient calling stating she just attempted to scheduled her mammogran and was asked if there are any concerns, patient stated \"achiness\" and they need an diagnostic mammogram ordered, Please call to discuss thank you.      Action Taken: Message routed to:  [facility]    Travel Screening: Not Applicable                                                                        "

## 2020-06-05 NOTE — TELEPHONE ENCOUNTER
I called and left VM. She can call to schedule mammo now.  Zainab Zavala, EMT at 10:49 AM on 6/5/2020.

## 2020-06-16 ENCOUNTER — ANCILLARY PROCEDURE (OUTPATIENT)
Dept: MAMMOGRAPHY | Facility: CLINIC | Age: 45
End: 2020-06-16
Attending: INTERNAL MEDICINE
Payer: COMMERCIAL

## 2020-06-16 DIAGNOSIS — N64.4 MASTODYNIA: ICD-10-CM

## 2020-07-22 ENCOUNTER — VIRTUAL VISIT (OUTPATIENT)
Dept: URGENT CARE | Facility: CLINIC | Age: 45
End: 2020-07-22
Payer: COMMERCIAL

## 2020-07-22 ENCOUNTER — NURSE TRIAGE (OUTPATIENT)
Dept: NURSING | Facility: CLINIC | Age: 45
End: 2020-07-22

## 2020-07-22 DIAGNOSIS — M79.89 SWELLING OF RIGHT INDEX FINGER: Primary | ICD-10-CM

## 2020-07-22 PROCEDURE — 99213 OFFICE O/P EST LOW 20 MIN: CPT | Mod: TEL | Performed by: EMERGENCY MEDICINE

## 2020-07-22 RX ORDER — PREDNISONE 20 MG/1
20 TABLET ORAL 2 TIMES DAILY
Qty: 6 TABLET | Refills: 0 | Status: SHIPPED | OUTPATIENT
Start: 2020-07-22 | End: 2020-07-25

## 2020-07-23 NOTE — PROGRESS NOTES
History: Patient is a 44-year-old female who yesterday at 11 AM noticed a sharp pain on her right index finger.  She presumes it was a bug bite and that there was no injury at the time.  She noted a small area of redness immediately with some sharp pain which lasted for a minute or so.  Last night the finger started to itch more become more swollen.  She tried hydrocortisone cream topically took Benadryl.  She states that the finger is still moderately swollen.  There is no severe pain to suggest vascular compromise.  The finger is very slightly erythematous and warm but clearly no pale white or bluish discoloration to suggest vascular compromise due to tense swelling.  She does admit to doing painting today with that hand and feels she may have caused increased swelling.  No fever.    Physical exam no acute distress over the phone.    Impression acute swelling and pruritus involving her right index finger what appears to be secondary to an insect bite.  Time course is too early to be an infectious process.  It appears to be a local allergic process exacerbated by excessive use of the hand today.  No evidence to suggest vascular compromise.    Plan: Explicit conversation about quiet activity and elevation with ice packs tonight.  Prednisone 20 mg twice daily for 3 days will be instituted.  She is to continue to take Benadryl.  Should the finger become more swollen and painful she is to have the finger seen immediately.  Over the next 1 to 2 days if there is no improvement of the swelling you should be seen and should she progress to have increased redness pain and swelling this may represent an infection and she needs to have the finger checked as well.    Time: 15 minutes

## 2020-07-23 NOTE — TELEPHONE ENCOUNTER
Patient calling reporting she had an insect bite on her finger. States finger is swollen. Denies fever. Rates pain at 7/10. Has not taken any medication for pain. Took Benadryl earlier today. Per guideline, advised patient to have a virtual visit within 24 hours.     Patient would rather have a virtual visit with an Urgent care this evening.     Warm transferred patient to central scheduling.     Osmany Saini RN  [facility]     COVID 19 Nurse Triage Plan/Patient Instructions    Please be aware that novel coronavirus (COVID-19) may be circulating in the community. If you develop symptoms such as fever, cough, or SOB or if you have concerns about the presence of another infection including coronavirus (COVID-19), please contact your health care provider or visit [email or web address].     Disposition/Instructions    Virtual Visit with provider recommended. Reference Visit Selection Guide.    Thank you for taking steps to prevent the spread of this virus.  o Limit your contact with others.  o Wear a simple mask to cover your cough.  o Wash your hands well and often.    Resources    [facility]: About COVID-19: [email or web address]    CDC: What to Do If You're Sick: www.cdc.gov/coronavirus/2019-ncov/about/steps-when-sick.html    CDC: Ending Home Isolation: www.cdc.gov/coronavirus/2019-ncov/hcp/disposition-in-home-patients.html     CDC: Caring for Someone: www.cdc.gov/coronavirus/2019-ncov/if-you-are-sick/care-for-someone.html     [facility]: Interim Guidance for Hospital Discharge to Home: www.health.[facility].mn.us/diseases/coronavirus/hcp/hospdischarge.pdf    [facility] clinical trials (COVID-19 research studies): clinicalaffairs.[facility] [place]/um-clinical-trials     Below are the COVID-19 hotlines at the [facility] ([facility]). Interpreters are available.   o For health questions: Call 096-362-9338 or 1-521.759.7597 (7 a.m. to 7 p.m.)  o For questions about schools and  childcare: Call 309-056-5605 or 1-727.268.5118 (7 a.m. to 7 p.m.)                       Additional Information    Negative: [1] Life-threatening reaction (anaphylaxis) in the past to same insect bite AND [2] < 2 hours since bite    Negative: Passed out (i.e., lost consciousness, collapsed and was not responding)    Negative: Difficulty breathing or wheezing    Negative: [1] Hoarseness or cough AND [2] sudden onset following bite    Negative: [1] Difficulty swallowing or slurred speech AND [2] sudden onset following bite    Negative: Sounds like a life-threatening emergency to the triager    Negative: Patient sounds very sick or weak to the triager    Negative: [1] Severe bite pain AND [2] not improved after 2 hours of pain medicine    Negative: [1] Fever AND [2] red area    Negative: [1] Fever AND [2] area is very tender to touch    Negative: [1] Red streak or red line AND [2] length > 2 inches (5 cm)    [1] Red or very tender (to touch) area AND [2] started over 24 hours after the bite    Protocols used: INSECT BITE-A-

## 2020-12-06 ENCOUNTER — HEALTH MAINTENANCE LETTER (OUTPATIENT)
Age: 45
End: 2020-12-06

## 2021-04-11 ENCOUNTER — HEALTH MAINTENANCE LETTER (OUTPATIENT)
Age: 46
End: 2021-04-11

## 2021-04-14 ENCOUNTER — OFFICE VISIT (OUTPATIENT)
Dept: ORTHOPEDICS | Facility: CLINIC | Age: 46
End: 2021-04-14
Payer: COMMERCIAL

## 2021-04-14 ENCOUNTER — ANCILLARY PROCEDURE (OUTPATIENT)
Dept: GENERAL RADIOLOGY | Facility: CLINIC | Age: 46
End: 2021-04-14
Payer: COMMERCIAL

## 2021-04-14 ENCOUNTER — TELEPHONE (OUTPATIENT)
Dept: INTERNAL MEDICINE | Facility: CLINIC | Age: 46
End: 2021-04-14

## 2021-04-14 VITALS — HEIGHT: 67 IN | BODY MASS INDEX: 19.62 KG/M2 | WEIGHT: 125 LBS

## 2021-04-14 DIAGNOSIS — M79.642 LEFT HAND PAIN: Primary | ICD-10-CM

## 2021-04-14 DIAGNOSIS — M79.642 LEFT HAND PAIN: ICD-10-CM

## 2021-04-14 DIAGNOSIS — M25.542 ARTHRALGIA OF LEFT HAND: Primary | ICD-10-CM

## 2021-04-14 PROCEDURE — 99203 OFFICE O/P NEW LOW 30 MIN: CPT | Performed by: FAMILY MEDICINE

## 2021-04-14 PROCEDURE — 73130 X-RAY EXAM OF HAND: CPT | Mod: LT | Performed by: RADIOLOGY

## 2021-04-14 ASSESSMENT — MIFFLIN-ST. JEOR: SCORE: 1226.69

## 2021-04-14 NOTE — TELEPHONE ENCOUNTER
Patient was reached by phone, and RN reviewed patient's symptoms of left hand pain following an injury.  RN advised that patient should call [facility] and be seen for an appt.  Ortho walk in clinic location and number was given.    Anabela Starr RN on 4/14/2021 at 11:41 AM

## 2021-04-14 NOTE — LETTER
4/14/2021         RE: Joyce Murry  [address]  [place] [address]        Dear Colleague,    Thank you for referring your patient, Joyce Murry, to the [facility] [place]. Please see a copy of my visit note below.      HISTORY OF PRESENT ILLNESS  Ms. Murry is a pleasant 45 year old female who presents to clinic today with a left hand injury.  Joyce fell, breaking the fall with an outstretched left hand, that was tucked just behind her.  This was yesterday while she was playing ultimate Frisbee.  She feels swelling and pain at the palmar aspect of her thumb.        Additional history: as documented    MEDICAL HISTORY  Patient Active Problem List   Diagnosis     Dyslipidemia, goal LDL below 160       Current Outpatient Medications   Medication Sig Dispense Refill     acyclovir (ZOVIRAX) 200 MG capsule Take 2 capsules by mouth every 8 hours       ALPRAZolam (XANAX) 0.25 MG tablet Take 1-2 tablets (0.25-0.5 mg) by mouth 2 times daily as needed for anxiety 10 tablet 0     doxycycline (VIBRAMYCIN) 100 MG capsule Take by mouth daily       doxycycline hyclate (VIBRAMYCIN) 50 MG capsule TK ONE C PO QD  3     escitalopram (LEXAPRO) 5 MG tablet Take 1 tablet (5 mg) by mouth daily If tolerating, increase to 10 mg (2 tabs) after 2 weeks 60 tablet 1     tretinoin (RETIN-A) 0.025 % external cream CYNTHIA A PEA SIZED AMT TO FACE NIGHTLY AS TOLERATED. RECOMMENDED TO START SLOWLY  11     tretinoin (RETIN-A) 0.1 % external cream APPLY PEA SIZE AMOUNT TO FACE QHS AS TOLERATED  11       Allergies   Allergen Reactions     Augmentin Rash       Family History   Problem Relation Age of Onset     Hypertension Father         pulmonary HTN     Heart Disease Father 55        CAD-      Diabetes Father 65        type 2     Breast Cancer Mother 63        diganosed age 50     Diabetes Type 1 Mother      Leukemia Mother         CLL     Lung Cancer Maternal Grandfather              Breast  Cancer Paternal Aunt      Colon Cancer No family hx of        Additional medical/Social/Surgical histories reviewed in [facility] and updated as appropriate.        PHYSICAL EXAM  General  - normal appearance, in no obvious distress  HEENT  - conjunctivae not injected, moist mucous membranes  CV  - normal radial pulse  Pulm  - normal respiratory pattern, non-labored  Musculoskeletal - left hand  - inspection: swollen thenar eminence  - palpation: tender thenar muscles   - ROM:  MCP 90 deg flexion   0 deg extension    deg flexion   0 deg extension   DIP 80 deg flexion   0 deg extension  - strength: 5/5  strength, 5/5 wrist abduction, 5/5 flexion, extension, pronation, supination, adduction  Neuro  - no numbness, no motor deficit, grossly normal coordination, normal muscle tone  Skin  - no ecchymosis, erythema, warmth, or induration, no obvious rash  Psych  - interactive, appropriate, normal mood and affect               ASSESSMENT & PLAN  Ms. Murry is a 45 year old female who presents to clinic today with left hand pain after a fall on an outstretched hand.    I ordered and reviewed an x-ray of her hand which shows no fractures or other acute issues.    Joyce does have swelling at the base of the thumb, indicating either a muscular contusion or a other musculoskeletal issue that is nonbony.  I did offer her a wrist and hand brace, which she declined.  I do recommend that she wrap her hand and ice frequently to keep down the swelling.    It was a pleasure seeing Joyce today.    Maximino Hancock DO, [facility]  Primary Care Sports Medicine      This note was constructed using Dragon dictation software, please excuse any minor errors in spelling, grammar, or syntax.      4/13/21 - NISSA L hand while playing ultimate frisbee with middle schoolers.

## 2021-04-14 NOTE — PROGRESS NOTES
HISTORY OF PRESENT ILLNESS  Ms. Murry is a pleasant 45 year old female who presents to clinic today with a left hand injury.  Joyce fell, breaking the fall with an outstretched left hand, that was tucked just behind her.  This was yesterday while she was playing ultimate Frisbee.  She feels swelling and pain at the palmar aspect of her thumb.        Additional history: as documented    MEDICAL HISTORY  Patient Active Problem List   Diagnosis     Dyslipidemia, goal LDL below 160       Current Outpatient Medications   Medication Sig Dispense Refill     acyclovir (ZOVIRAX) 200 MG capsule Take 2 capsules by mouth every 8 hours       ALPRAZolam (XANAX) 0.25 MG tablet Take 1-2 tablets (0.25-0.5 mg) by mouth 2 times daily as needed for anxiety 10 tablet 0     doxycycline (VIBRAMYCIN) 100 MG capsule Take by mouth daily       doxycycline hyclate (VIBRAMYCIN) 50 MG capsule TK ONE C PO QD  3     escitalopram (LEXAPRO) 5 MG tablet Take 1 tablet (5 mg) by mouth daily If tolerating, increase to 10 mg (2 tabs) after 2 weeks 60 tablet 1     tretinoin (RETIN-A) 0.025 % external cream CYNTHIA A PEA SIZED AMT TO FACE NIGHTLY AS TOLERATED. RECOMMENDED TO START SLOWLY  11     tretinoin (RETIN-A) 0.1 % external cream APPLY PEA SIZE AMOUNT TO FACE QHS AS TOLERATED  11       Allergies   Allergen Reactions     Augmentin Rash       Family History   Problem Relation Age of Onset     Hypertension Father         pulmonary HTN     Heart Disease Father 55        CAD-      Diabetes Father 65        type 2     Breast Cancer Mother 63        diganosed age 50     Diabetes Type 1 Mother      Leukemia Mother         CLL     Lung Cancer Maternal Grandfather              Breast Cancer Paternal Aunt      Colon Cancer No family hx of        Additional medical/Social/Surgical histories reviewed in [facility] and updated as appropriate.        PHYSICAL EXAM  General  - normal appearance, in no obvious distress  HEENT  - conjunctivae not injected, moist  mucous membranes  CV  - normal radial pulse  Pulm  - normal respiratory pattern, non-labored  Musculoskeletal - left hand  - inspection: swollen thenar eminence  - palpation: tender thenar muscles   - ROM:  MCP 90 deg flexion   0 deg extension    deg flexion   0 deg extension   DIP 80 deg flexion   0 deg extension  - strength: 5/5  strength, 5/5 wrist abduction, 5/5 flexion, extension, pronation, supination, adduction  Neuro  - no numbness, no motor deficit, grossly normal coordination, normal muscle tone  Skin  - no ecchymosis, erythema, warmth, or induration, no obvious rash  Psych  - interactive, appropriate, normal mood and affect               ASSESSMENT & PLAN  Ms. Murry is a 45 year old female who presents to clinic today with left hand pain after a fall on an outstretched hand.    I ordered and reviewed an x-ray of her hand which shows no fractures or other acute issues.    Joyce does have swelling at the base of the thumb, indicating either a muscular contusion or a other musculoskeletal issue that is nonbony.  I did offer her a wrist and hand brace, which she declined.  I do recommend that she wrap her hand and ice frequently to keep down the swelling.    It was a pleasure seeing Joyce today.    Maximino Hancock DO, [facility]  Primary Care Sports Medicine      This note was constructed using Dragon dictation software, please excuse any minor errors in spelling, grammar, or syntax.

## 2021-04-14 NOTE — TELEPHONE ENCOUNTER
[facility] Call Center    Phone Message    May a detailed message be left on voicemail: yes     Reason for Call: Symptoms or Concerns     If patient has red-flag symptoms, warm transfer to triage line    Current symptom or concern: L hand injury/ pain    Symptoms have been present for:  1 day(s)    Has patient previously been seen for this? No    By : Dr. Pearson    Are there any new or worsening symptoms? Yes: Pt called and stated she injured her L hand while playing sports yesterday, She said the pain was really bad yesterday, and woke her up last night, and she has some bruising by her thumb. Pt wondering if she should get it checked out or if it will just heal on its own      Action Taken: Message routed to:  [facility] chris

## 2021-04-20 ENCOUNTER — TELEPHONE (OUTPATIENT)
Dept: ORTHOPEDICS | Facility: CLINIC | Age: 46
End: 2021-04-20

## 2021-04-20 NOTE — TELEPHONE ENCOUNTER
NIKI for patient about her hand numbness, patient was offered to come in tomorrow to see  or to have a virtual or telephone visit with  tomorrow as well 4/21/21.

## 2021-06-17 ENCOUNTER — ANCILLARY PROCEDURE (OUTPATIENT)
Dept: MAMMOGRAPHY | Facility: CLINIC | Age: 46
End: 2021-06-17
Payer: COMMERCIAL

## 2021-06-17 DIAGNOSIS — Z12.31 VISIT FOR SCREENING MAMMOGRAM: ICD-10-CM

## 2021-06-17 PROCEDURE — 77067 SCR MAMMO BI INCL CAD: CPT | Mod: GC

## 2021-06-17 PROCEDURE — 77063 BREAST TOMOSYNTHESIS BI: CPT | Mod: GC

## 2021-09-25 ENCOUNTER — HEALTH MAINTENANCE LETTER (OUTPATIENT)
Age: 46
End: 2021-09-25

## 2022-01-01 NOTE — TELEPHONE ENCOUNTER
ALPRAZolam (XANAX) 0.25 MG tablet 10 tablet 0 6/26/2017  --   Sig: Take 1 tablet before flying in an airplane; repeat as needed and 30 minutes     Called to [facility].  Rhiannon Garcia RN 11:43 AM on 6/27/2017.                                                                                                                                                                                                                                                                                                                                                                                                                                                                                                                                                                                                                                                                                                                                                                                                                -NPO, D10 Early TPN at 80 ml/kg/day   -Will encourage mother to start hand expressing colostrum.   -Will arrange for lactation consult

## 2022-05-07 ENCOUNTER — HEALTH MAINTENANCE LETTER (OUTPATIENT)
Age: 47
End: 2022-05-07

## 2022-06-29 ENCOUNTER — ANCILLARY PROCEDURE (OUTPATIENT)
Dept: MAMMOGRAPHY | Facility: CLINIC | Age: 47
End: 2022-06-29
Attending: INTERNAL MEDICINE
Payer: COMMERCIAL

## 2022-06-29 DIAGNOSIS — Z12.31 SCREENING MAMMOGRAM, ENCOUNTER FOR: ICD-10-CM

## 2022-06-29 PROCEDURE — 77067 SCR MAMMO BI INCL CAD: CPT | Mod: GC | Performed by: RADIOLOGY

## 2022-06-29 PROCEDURE — 77063 BREAST TOMOSYNTHESIS BI: CPT | Mod: GC | Performed by: RADIOLOGY

## 2022-07-06 ENCOUNTER — OFFICE VISIT (OUTPATIENT)
Dept: INTERNAL MEDICINE | Facility: CLINIC | Age: 47
End: 2022-07-06
Payer: COMMERCIAL

## 2022-07-06 VITALS
DIASTOLIC BLOOD PRESSURE: 66 MMHG | SYSTOLIC BLOOD PRESSURE: 98 MMHG | OXYGEN SATURATION: 98 % | BODY MASS INDEX: 20.21 KG/M2 | HEART RATE: 71 BPM | WEIGHT: 128.8 LBS | TEMPERATURE: 98.2 F | HEIGHT: 67 IN | RESPIRATION RATE: 12 BRPM

## 2022-07-06 DIAGNOSIS — Z12.31 ENCOUNTER FOR SCREENING MAMMOGRAM FOR BREAST CANCER: ICD-10-CM

## 2022-07-06 DIAGNOSIS — Z00.00 ROUTINE GENERAL MEDICAL EXAMINATION AT A HEALTH CARE FACILITY: ICD-10-CM

## 2022-07-06 DIAGNOSIS — F41.1 GENERALIZED ANXIETY DISORDER: ICD-10-CM

## 2022-07-06 DIAGNOSIS — Z12.11 SCREENING FOR COLON CANCER: ICD-10-CM

## 2022-07-06 DIAGNOSIS — G43.709 CHRONIC MIGRAINE WITHOUT AURA WITHOUT STATUS MIGRAINOSUS, NOT INTRACTABLE: Primary | ICD-10-CM

## 2022-07-06 PROCEDURE — 99396 PREV VISIT EST AGE 40-64: CPT | Performed by: INTERNAL MEDICINE

## 2022-07-06 ASSESSMENT — PAIN SCALES - GENERAL: PAINLEVEL: NO PAIN (0)

## 2022-07-06 NOTE — PROGRESS NOTES
History of Present Illness:  Ms. Murry is a 46 year old female who presents for  Chief Complaint   Patient presents with     Physical     Patient comes in for a physical exam.       Mental health is better now, never took lexapro. States pandemic helped put things in perspective, slowed things down.  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  She is exercising 5 days per week, gets up early before kids.  Not drinking much, once a week or less.    She does report hormonal migraines, before and after periods.  Tried acupuncture.  Takes ibuprofen.  3-5 migraines per month.  Frontal location, maybe nausea, light sensitivity.    She states gynecologist felt she had a hernia in inguinal region on R.  No bothersome.  Uncomfortable sensation. No trouble with BMs.        Routine Health Maintenence:  Lung CT: n/a  Colonoscopy (45-75 yrs): scheduled  Dexa (>65W or 70M yrs): n/a  Mammogram (40-75 yrs): 6/22  Pap (21-65 yrs): 11/20 NIL, HPV neg        Review of external notes as documented above                   A detailed Review of Systems was performed, verified and is negative except as documented in the HPI.  All health questionnaires were reviewed, verified and relevant information documented above.    Past Medical History:  Past Medical History:   Diagnosis Date     Anxiety      Genital herpes      History of hyperlipidemia, improved as of 2016 with diet and exercise      Menstrual migraine        Past Surgical History:  Past Surgical History:   Procedure Laterality Date     COLONOSCOPY  12/19/2017    Dr. Joyce [facility]     COLONOSCOPY N/A 12/19/2017    Procedure: COLONOSCOPY;  Colonoscopy ;  Surgeon: Leona Joyce MD;  Location:  GI     D & C  2010     HEAD & NECK SURGERY      wisdom teeth removed       Active Meds:  Current Outpatient Medications   Medication     acyclovir (ZOVIRAX) 200 MG capsule     tretinoin (RETIN-A) 0.025 % external cream     tretinoin  "(RETIN-A) 0.1 % external cream     ALPRAZolam (XANAX) 0.25 MG tablet     escitalopram (LEXAPRO) 5 MG tablet     No current facility-administered medications for this visit.        Allergies:  Augmentin    Family History:  family history includes Breast Cancer in her paternal aunt; Breast Cancer (age of onset: 63) in her mother; Diabetes (age of onset: 65) in her father; Diabetes Type 1 in her mother; Heart Disease (age of onset: 55) in her father; Hypertension in her father; Leukemia in her mother; Lung Cancer in her maternal grandfather.    Social History:  Social History     Tobacco Use     Smoking status: Former Smoker     Quit date: 2002     Years since quittin.3     Smokeless tobacco: Never Used   Substance Use Topics     Alcohol use: Yes     Comment: rarely     Drug use: No       Physical Exam:  Vitals: BP 98/66   Pulse 71   Temp 98.2  F (36.8  C) (Oral)   Resp 12   Ht 1.689 m (5' 6.5\")   Wt 58.4 kg (128 lb 12.8 oz)   SpO2 98%   BMI 20.48 kg/m    Constitutional: Alert, oriented, pleasant, no acute distress  Head: Normocephalic, atraumatic  Eyes: Extra-ocular movements intact, pupils equally round and reactive bilaterally, no scleral icterus  ENT: Oropharynx clear, moist mucus membranes, good dentition  Neck: Supple, no lymphadenopathy, no thyromegaly  Cardiovascular: Regular rate and rhythm, no murmurs, rubs or gallops, peripheral pulses full/symmetric  Respiratory: Good air movement bilaterally, lungs clear, no wheezes/rales/rhonchi  GI: Abdomen soft, bowel sounds present, nondistended, nontender, no organomegaly or masses, no rebound/guarding, no hernia present  Musculoskeletal: No edema, normal muscle tone, normal gait  Neurologic: Alert and oriented, cranial nerves 2-12 intact, grossly non-focal  Skin: No rashes/lesions, small hyperkeratotic skin on plantar heel L and R, non verrucous appearing, no foreign body identified  Psychiatric: normal mentation, affect and " mood      Diagnostics:  Labs reviewed in Epic          Assessment and Plan:  Joyce was seen today for physical.    Diagnoses and all orders for this visit:    Chronic migraine without aura without status migrainosus, not intractable  Discussed range of options for abortive treatment and prophylaxis. She is specifically wondering about acupuncture and botox, less interested in medications. We discussed keeping a headache journal and considering a referral to discuss treatment options if she is interested.  -     Adult Neurology  Referral    Generalized anxiety disorder  Well controlled at present    Routine general medical examination at a health care facility  Screening for colon cancer  Encounter for screening mammogram for breast cancer  Routine health care reviewed and updated as appropriate.  Labs from 2020 appear normal.          Odalis Pearson MD  Internal Medicine

## 2022-07-06 NOTE — NURSING NOTE
Chief Complaint   Patient presents with     Physical     Patient comes in for a physical exam.         Lincoln Shea MA on 7/6/2022 at 9:09 AM

## 2022-07-12 ENCOUNTER — HOSPITAL ENCOUNTER (OUTPATIENT)
Facility: CLINIC | Age: 47
Discharge: HOME | End: 2022-07-12
Attending: COLON & RECTAL SURGERY | Admitting: COLON & RECTAL SURGERY
Payer: COMMERCIAL

## 2022-07-12 VITALS
TEMPERATURE: 98.4 F | BODY MASS INDEX: 20.21 KG/M2 | HEIGHT: 67 IN | OXYGEN SATURATION: 100 % | SYSTOLIC BLOOD PRESSURE: 109 MMHG | DIASTOLIC BLOOD PRESSURE: 78 MMHG | WEIGHT: 128.8 LBS | RESPIRATION RATE: 14 BRPM | HEART RATE: 52 BPM

## 2022-07-12 LAB — COLONOSCOPY: NORMAL

## 2022-07-12 PROCEDURE — G0121 COLON CA SCRN NOT HI RSK IND: HCPCS | Performed by: COLON & RECTAL SURGERY

## 2022-07-12 PROCEDURE — 45378 DIAGNOSTIC COLONOSCOPY: CPT | Performed by: COLON & RECTAL SURGERY

## 2022-07-12 PROCEDURE — G0500 MOD SEDAT ENDO SERVICE >5YRS: HCPCS | Performed by: COLON & RECTAL SURGERY

## 2022-07-12 PROCEDURE — 250N000011 HC RX IP 250 OP 636: Performed by: COLON & RECTAL SURGERY

## 2022-07-12 RX ORDER — DIPHENHYDRAMINE HYDROCHLORIDE 50 MG/ML
25-50 INJECTION INTRAMUSCULAR; INTRAVENOUS
Status: DISCONTINUED | OUTPATIENT
Start: 2022-07-12 | End: 2022-07-12 | Stop reason: HOSPADM

## 2022-07-12 RX ORDER — NALOXONE HYDROCHLORIDE 0.4 MG/ML
0.2 INJECTION, SOLUTION INTRAMUSCULAR; INTRAVENOUS; SUBCUTANEOUS
Status: DISCONTINUED | OUTPATIENT
Start: 2022-07-12 | End: 2022-07-12 | Stop reason: HOSPADM

## 2022-07-12 RX ORDER — ONDANSETRON 2 MG/ML
4 INJECTION INTRAMUSCULAR; INTRAVENOUS
Status: DISCONTINUED | OUTPATIENT
Start: 2022-07-12 | End: 2022-07-12 | Stop reason: HOSPADM

## 2022-07-12 RX ORDER — LIDOCAINE 40 MG/G
CREAM TOPICAL
Status: DISCONTINUED | OUTPATIENT
Start: 2022-07-12 | End: 2022-07-12 | Stop reason: HOSPADM

## 2022-07-12 RX ORDER — EPINEPHRINE 1 MG/ML
0.1 INJECTION, SOLUTION INTRAMUSCULAR; SUBCUTANEOUS
Status: DISCONTINUED | OUTPATIENT
Start: 2022-07-12 | End: 2022-07-12 | Stop reason: HOSPADM

## 2022-07-12 RX ORDER — PROCHLORPERAZINE MALEATE 10 MG
10 TABLET ORAL EVERY 6 HOURS PRN
Status: DISCONTINUED | OUTPATIENT
Start: 2022-07-12 | End: 2022-07-12 | Stop reason: HOSPADM

## 2022-07-12 RX ORDER — ONDANSETRON 4 MG/1
4 TABLET, ORALLY DISINTEGRATING ORAL EVERY 6 HOURS PRN
Status: DISCONTINUED | OUTPATIENT
Start: 2022-07-12 | End: 2022-07-12 | Stop reason: HOSPADM

## 2022-07-12 RX ORDER — NALOXONE HYDROCHLORIDE 0.4 MG/ML
0.4 INJECTION, SOLUTION INTRAMUSCULAR; INTRAVENOUS; SUBCUTANEOUS
Status: DISCONTINUED | OUTPATIENT
Start: 2022-07-12 | End: 2022-07-12 | Stop reason: HOSPADM

## 2022-07-12 RX ORDER — FLUMAZENIL 0.1 MG/ML
0.2 INJECTION, SOLUTION INTRAVENOUS
Status: DISCONTINUED | OUTPATIENT
Start: 2022-07-12 | End: 2022-07-12 | Stop reason: HOSPADM

## 2022-07-12 RX ORDER — SIMETHICONE 40MG/0.6ML
133 SUSPENSION, DROPS(FINAL DOSAGE FORM)(ML) ORAL
Status: DISCONTINUED | OUTPATIENT
Start: 2022-07-12 | End: 2022-07-12 | Stop reason: HOSPADM

## 2022-07-12 RX ORDER — ATROPINE SULFATE 0.1 MG/ML
1 INJECTION INTRAVENOUS
Status: DISCONTINUED | OUTPATIENT
Start: 2022-07-12 | End: 2022-07-12 | Stop reason: HOSPADM

## 2022-07-12 RX ORDER — ONDANSETRON 2 MG/ML
4 INJECTION INTRAMUSCULAR; INTRAVENOUS EVERY 6 HOURS PRN
Status: DISCONTINUED | OUTPATIENT
Start: 2022-07-12 | End: 2022-07-12 | Stop reason: HOSPADM

## 2022-07-12 RX ORDER — FENTANYL CITRATE 0.05 MG/ML
50-100 INJECTION, SOLUTION INTRAMUSCULAR; INTRAVENOUS EVERY 5 MIN PRN
Status: DISCONTINUED | OUTPATIENT
Start: 2022-07-12 | End: 2022-07-12 | Stop reason: HOSPADM

## 2022-07-12 RX ADMIN — FENTANYL CITRATE 100 MCG: 50 INJECTION INTRAMUSCULAR; INTRAVENOUS at 08:22

## 2022-07-12 RX ADMIN — MIDAZOLAM HYDROCHLORIDE 2 MG: 1 INJECTION, SOLUTION INTRAMUSCULAR; INTRAVENOUS at 08:22

## 2022-07-12 NOTE — H&P
Pre-Endoscopy History and Physical     Joyce Murry MRN# [id number]   YOB: 1975 Age: 46 year old     Date of Procedure: 7/12/2022  Primary care provider: Odalis Pearson  Type of Endoscopy: colonoscopy  Reason for Procedure: screening  Type of Anesthesia Anticipated: Moderate Sedation    HPI:    Joyce is a 46 year old female who will be undergoing the above procedure.      A history and physical has been performed. The patient's medications and allergies have been reviewed. The risks and benefits of the procedure and the sedation options and risks were discussed with the patient.  All questions were answered and informed consent was obtained.      She denies a personal or family history of anesthesia complications or bleeding disorders.     Allergies   Allergen Reactions     Augmentin Rash        Prior to Admission Medications   Prescriptions Last Dose Informant Patient Reported? Taking?   ALPRAZolam (XANAX) 0.25 MG tablet   No No   Sig: Take 1-2 tablets (0.25-0.5 mg) by mouth 2 times daily as needed for anxiety   Patient not taking: Reported on 7/6/2022   acyclovir (ZOVIRAX) 200 MG capsule   Yes No   Sig: Take 2 capsules by mouth every 8 hours   tretinoin (RETIN-A) 0.025 % external cream   Yes No   Sig: CYNTHIA A PEA SIZED AMT TO FACE NIGHTLY AS TOLERATED. RECOMMENDED TO START SLOWLY   tretinoin (RETIN-A) 0.1 % external cream   Yes No   Sig: APPLY PEA SIZE AMOUNT TO FACE QHS AS TOLERATED      Facility-Administered Medications: None       Patient Active Problem List   Diagnosis     Dyslipidemia, goal LDL below 160        Past Medical History:   Diagnosis Date     Anxiety      Genital herpes      History of hyperlipidemia, improved as of 2016 with diet and exercise      Menstrual migraine         Past Surgical History:   Procedure Laterality Date     COLONOSCOPY  12/19/2017    Dr. Joyce [facility]     COLONOSCOPY N/A 12/19/2017    Procedure: COLONOSCOPY;  Colonoscopy ;  Surgeon: Leona Joyce MD;   "Location:  GI     D & C       HEAD & NECK SURGERY      wisdom teeth removed       Social History     Tobacco Use     Smoking status: Former Smoker     Quit date: 2002     Years since quittin.4     Smokeless tobacco: Never Used   Substance Use Topics     Alcohol use: Yes     Comment: once a week, 1-2 drinks       Family History   Problem Relation Age of Onset     Hypertension Father         pulmonary HTN     Heart Disease Father 55        CAD-      Diabetes Father 65        type 2     Breast Cancer Mother 63        diganosed age 50     Diabetes Type 1 Mother      Leukemia Mother         CLL     Lung Cancer Maternal Grandfather              Breast Cancer Paternal Aunt      Colon Cancer No family hx of        REVIEW OF SYSTEMS:     5 point ROS negative except as noted above in HPI, including Gen., Resp., CV, GI &  system review.      PHYSICAL EXAM:   Ht 1.689 m (5' 6.5\")   Wt 58.4 kg (128 lb 12.8 oz)   BMI 20.48 kg/m   Estimated body mass index is 20.48 kg/m  as calculated from the following:    Height as of this encounter: 1.689 m (5' 6.5\").    Weight as of this encounter: 58.4 kg (128 lb 12.8 oz).   GENERAL APPEARANCE: healthy and alert  MENTAL STATUS: alert  AIRWAY EXAM: Mallampatti Class II (visualization of the soft palate, fauces, and uvula)  RESP: lungs clear to auscultation - no rales, rhonchi or wheezes  CV: regular rates and rhythm      DIAGNOSTICS:    Not indicated      IMPRESSION   ASA Class 2 - Mild systemic disease        PLAN:       Plan for colonoscopy. We discussed the risks, benefits and alternatives and the patient wished to proceed.    The above has been forwarded to the consulting provider.      Signed Electronically by: Leona Joyce MD, MD  2022    "

## 2023-01-07 ENCOUNTER — HEALTH MAINTENANCE LETTER (OUTPATIENT)
Age: 48
End: 2023-01-07

## 2023-01-11 NOTE — PROGRESS NOTES
4/13/21 - FOOSH L hand while playing ultimate frisbee with middle schoolers.   Patient called stating he went and had labs done this morning     Please advise

## 2023-03-14 ENCOUNTER — OFFICE VISIT (OUTPATIENT)
Dept: INTERNAL MEDICINE | Facility: CLINIC | Age: 48
End: 2023-03-14
Payer: COMMERCIAL

## 2023-03-14 VITALS
BODY MASS INDEX: 22.32 KG/M2 | HEART RATE: 69 BPM | OXYGEN SATURATION: 100 % | HEIGHT: 66 IN | SYSTOLIC BLOOD PRESSURE: 106 MMHG | DIASTOLIC BLOOD PRESSURE: 71 MMHG | WEIGHT: 138.9 LBS

## 2023-03-14 DIAGNOSIS — H81.10 BENIGN PAROXYSMAL POSITIONAL VERTIGO, UNSPECIFIED LATERALITY: Primary | ICD-10-CM

## 2023-03-14 PROCEDURE — 99212 OFFICE O/P EST SF 10 MIN: CPT | Mod: GC

## 2023-03-14 RX ORDER — VALACYCLOVIR HYDROCHLORIDE 500 MG/1
500 TABLET, FILM COATED ORAL 2 TIMES DAILY PRN
COMMUNITY
Start: 2022-11-08 | End: 2023-09-29

## 2023-03-14 NOTE — PROGRESS NOTES
"                  Internal Medicine Primary Care   SUBJECTIVE  CC: nausea, vertigo    HPI: Joyce Murry is a 47 year old female with a PMHx of anxiety, premenstrual disorder, and migraines presenting with nausea and a sensation of vertigo. Nausea is the more prominent symptom, it began 10 days ago and mild, she does not have any associated emesis. Her menstrual period was on the 3/4/23 and she says \" absolutely no chance I am pregnant\".  Patient also has associated sensation of dizziness, which is episodic and transient in nature, lasts only a few seconds and provoked by rapid head movement of rapid repositioning. Patient has not tried any anti-nausea/emetics. Bms are regular.     She denies persistent headache/migraine, emesis, bloating, heart burn. She denies gait abnormalities. She denies recent URI symptoms, ear pain/discharge, fever, chills.       PAST MEDICAL HISTORY  Patient Active Problem List   Diagnosis     Dyslipidemia, goal LDL below 160         MEDICATIONS  Current Outpatient Medications   Medication Sig Dispense Refill     tretinoin (RETIN-A) 0.1 % external cream APPLY PEA SIZE AMOUNT TO FACE QHS AS TOLERATED  11     acyclovir (ZOVIRAX) 200 MG capsule Take 2 capsules by mouth every 8 hours (Patient not taking: Reported on 3/14/2023)       ALPRAZolam (XANAX) 0.25 MG tablet Take 1-2 tablets (0.25-0.5 mg) by mouth 2 times daily as needed for anxiety (Patient not taking: Reported on 3/14/2023) 10 tablet 0     tretinoin (RETIN-A) 0.025 % external cream CYNTHIA A PEA SIZED AMT TO FACE NIGHTLY AS TOLERATED. RECOMMENDED TO START SLOWLY (Patient not taking: Reported on 3/14/2023)  11     valACYclovir (VALTREX) 500 MG tablet Take 500 mg by mouth 2 times daily as needed (Patient not taking: Reported on 3/14/2023)           PAST SURGICAL HISTORY  Past Surgical History:   Procedure Laterality Date     COLONOSCOPY  12/19/2017    Dr. Isiah GOLDSTEIN     COLONOSCOPY N/A 12/19/2017    Procedure: COLONOSCOPY;  Colonoscopy ;  " "Surgeon: Leona Joyce MD;  Location:  GI     COLONOSCOPY N/A 7/12/2022    Procedure: COLONOSCOPY crsal;  Surgeon: Leona Joyce MD;  Location:  GI     D & C  2010     HEAD & NECK SURGERY      wisdom teeth removed         FAMILY HISTORY  Family History   Problem Relation Age of Onset     Hypertension Father         pulmonary HTN     Heart Disease Father 55        CAD-      Diabetes Father 65        type 2     Breast Cancer Mother 63        diganosed age 50     Diabetes Type 1 Mother      Leukemia Mother         CLL     Lung Cancer Maternal Grandfather              Breast Cancer Paternal Aunt      Colon Cancer No family hx of          ALLERGIES     Allergies   Allergen Reactions     Augmentin Rash         OBJECTIVE    Vitals  /71 (BP Location: Right arm, Patient Position: Sitting, Cuff Size: Adult Regular)   Pulse 69   Ht 1.689 m (5' 6.5\")   Wt 63 kg (138 lb 14.4 oz)   LMP 03/03/2023 (Approximate)   SpO2 100%   BMI 22.09 kg/m      Last 6 BPs  BP Readings from Last 6 Encounters:   03/14/23 106/71   07/12/22 109/78   07/06/22 98/66   10/30/19 104/67   07/31/19 (!) 86/55   06/10/19 113/72       Physical Exam  Constitutional:       Appearance: Normal appearance. She is normal weight.   HENT:      Right Ear: Tympanic membrane and external ear normal.      Left Ear: Tympanic membrane and external ear normal.      Mouth/Throat:      Mouth: Mucous membranes are dry.   Eyes:      Extraocular Movements: Extraocular movements intact.      Pupils: Pupils are equal, round, and reactive to light.      Comments: No nystagmus, no skew   Cardiovascular:      Rate and Rhythm: Normal rate and regular rhythm.      Pulses: Normal pulses.      Heart sounds: Normal heart sounds.   Pulmonary:      Breath sounds: Normal breath sounds.   Abdominal:      General: Abdomen is flat. There is no distension.      Palpations: Abdomen is soft.      Tenderness: There is no abdominal tenderness. There is no " "guarding.   Skin:     General: Skin is warm and dry.   Neurological:      General: No focal deficit present.      Mental Status: She is alert and oriented to person, place, and time.         ASSESSMENT AND PLAN    Possible BPPV  Given positional transient vertigo, this may very well could be BPPV with secondary nausea. Educated patient on BPPV. HINTS exam is negative, ear exam is negative. Difficult to provoke with Schertz-Hallpike, patient described the vertigo as \"very subtle\", again nausea was more prominent. DDx includes BPPV, vestibular neuritis, menieres, vestibular neuroma  - Educated patient on BPPV, and canalith repositioning maneuvers   - Patient unfortunately left before completing self CRT, but before she left advised patient on doing this maneuver atleast few times a day for 1 week   - Return to clinic in 1 week if symptoms still persists  - Could consider audiogram at next visit if symptoms persist  - vestibular pt referral    Patient understands and agrees with the above assessment and plan. Patient was staffed and seen with Dr. Herrera    Follow up  RTC in 1 week or earlier for follow up, if needed      Nilsa Thacker, DO  Internal Medicine PGY-2    "

## 2023-03-14 NOTE — NURSING NOTE
"Joyce Murry is a 47 year old female patient that presents today in clinic for the following:    Chief Complaint   Patient presents with     Follow Up     Pt here for nausea and dizziness over the past week. Pt states it's abnormal since it is not happening with her menstrual cycle. Pt has dry mouth.     The patient's allergies and medications were reviewed as noted. A set of vitals were recorded as noted without incident: /71 (BP Location: Right arm, Patient Position: Sitting, Cuff Size: Adult Regular)   Pulse 69   Ht 1.689 m (5' 6.5\")   Wt 63 kg (138 lb 14.4 oz)   LMP 03/03/2023 (Approximate)   SpO2 100%   BMI 22.09 kg/m  . The patient does not have any other questions for the provider.    Marilee Oakley, EMT at 7:41 AM on 3/14/2023  "

## 2023-07-17 ENCOUNTER — ANCILLARY PROCEDURE (OUTPATIENT)
Dept: MAMMOGRAPHY | Facility: CLINIC | Age: 48
End: 2023-07-17
Attending: INTERNAL MEDICINE
Payer: COMMERCIAL

## 2023-07-17 DIAGNOSIS — Z12.31 VISIT FOR SCREENING MAMMOGRAM: ICD-10-CM

## 2023-07-17 PROCEDURE — 77067 SCR MAMMO BI INCL CAD: CPT | Mod: TC | Performed by: RADIOLOGY

## 2023-07-17 PROCEDURE — 77063 BREAST TOMOSYNTHESIS BI: CPT | Mod: TC | Performed by: RADIOLOGY

## 2023-09-29 ENCOUNTER — OFFICE VISIT (OUTPATIENT)
Dept: INTERNAL MEDICINE | Facility: CLINIC | Age: 48
End: 2023-09-29
Payer: COMMERCIAL

## 2023-09-29 ENCOUNTER — LAB (OUTPATIENT)
Dept: LAB | Facility: CLINIC | Age: 48
End: 2023-09-29
Payer: COMMERCIAL

## 2023-09-29 ENCOUNTER — PATIENT OUTREACH (OUTPATIENT)
Dept: ONCOLOGY | Facility: CLINIC | Age: 48
End: 2023-09-29

## 2023-09-29 VITALS
DIASTOLIC BLOOD PRESSURE: 60 MMHG | BODY MASS INDEX: 21.72 KG/M2 | HEART RATE: 69 BPM | OXYGEN SATURATION: 99 % | WEIGHT: 136.6 LBS | SYSTOLIC BLOOD PRESSURE: 101 MMHG

## 2023-09-29 DIAGNOSIS — Z23 NEED FOR INFLUENZA VACCINATION: ICD-10-CM

## 2023-09-29 DIAGNOSIS — Z13.220 SCREENING FOR HYPERLIPIDEMIA: ICD-10-CM

## 2023-09-29 DIAGNOSIS — Z13.1 SCREENING FOR DIABETES MELLITUS: ICD-10-CM

## 2023-09-29 DIAGNOSIS — Z00.00 ROUTINE GENERAL MEDICAL EXAMINATION AT A HEALTH CARE FACILITY: ICD-10-CM

## 2023-09-29 DIAGNOSIS — Z80.3 FAMILY HISTORY OF MALIGNANT NEOPLASM OF BREAST: ICD-10-CM

## 2023-09-29 DIAGNOSIS — Z13.220 SCREENING FOR HYPERLIPIDEMIA: Primary | ICD-10-CM

## 2023-09-29 DIAGNOSIS — Z11.59 SCREENING FOR VIRAL DISEASE: ICD-10-CM

## 2023-09-29 DIAGNOSIS — R41.840 INATTENTION: ICD-10-CM

## 2023-09-29 LAB
ALBUMIN SERPL BCG-MCNC: 4.2 G/DL (ref 3.5–5.2)
ALP SERPL-CCNC: 31 U/L (ref 35–104)
ALT SERPL W P-5'-P-CCNC: <5 U/L (ref 0–50)
ANION GAP SERPL CALCULATED.3IONS-SCNC: 7 MMOL/L (ref 7–15)
AST SERPL W P-5'-P-CCNC: 13 U/L (ref 0–45)
BILIRUB SERPL-MCNC: 0.3 MG/DL
BUN SERPL-MCNC: 11.7 MG/DL (ref 6–20)
CALCIUM SERPL-MCNC: 9.4 MG/DL (ref 8.6–10)
CHLORIDE SERPL-SCNC: 105 MMOL/L (ref 98–107)
CHOLEST SERPL-MCNC: 201 MG/DL
CREAT SERPL-MCNC: 0.8 MG/DL (ref 0.51–0.95)
DEPRECATED HCO3 PLAS-SCNC: 27 MMOL/L (ref 22–29)
EGFRCR SERPLBLD CKD-EPI 2021: 90 ML/MIN/1.73M2
GLUCOSE SERPL-MCNC: 101 MG/DL (ref 70–99)
HBA1C MFR BLD: 5.8 %
HDLC SERPL-MCNC: 68 MG/DL
LDLC SERPL CALC-MCNC: 107 MG/DL
NONHDLC SERPL-MCNC: 133 MG/DL
POTASSIUM SERPL-SCNC: 4.4 MMOL/L (ref 3.4–5.3)
PROT SERPL-MCNC: 6.8 G/DL (ref 6.4–8.3)
SODIUM SERPL-SCNC: 139 MMOL/L (ref 135–145)
TRIGL SERPL-MCNC: 132 MG/DL

## 2023-09-29 PROCEDURE — 86803 HEPATITIS C AB TEST: CPT | Performed by: INTERNAL MEDICINE

## 2023-09-29 PROCEDURE — 36415 COLL VENOUS BLD VENIPUNCTURE: CPT | Performed by: PATHOLOGY

## 2023-09-29 PROCEDURE — 80053 COMPREHEN METABOLIC PANEL: CPT | Performed by: PATHOLOGY

## 2023-09-29 PROCEDURE — 99396 PREV VISIT EST AGE 40-64: CPT | Mod: 25 | Performed by: INTERNAL MEDICINE

## 2023-09-29 PROCEDURE — 90686 IIV4 VACC NO PRSV 0.5 ML IM: CPT | Performed by: INTERNAL MEDICINE

## 2023-09-29 PROCEDURE — 90471 IMMUNIZATION ADMIN: CPT | Performed by: INTERNAL MEDICINE

## 2023-09-29 PROCEDURE — 80061 LIPID PANEL: CPT | Performed by: PATHOLOGY

## 2023-09-29 PROCEDURE — 83036 HEMOGLOBIN GLYCOSYLATED A1C: CPT | Performed by: INTERNAL MEDICINE

## 2023-09-29 PROCEDURE — 87389 HIV-1 AG W/HIV-1&-2 AB AG IA: CPT | Performed by: INTERNAL MEDICINE

## 2023-09-29 PROCEDURE — 86706 HEP B SURFACE ANTIBODY: CPT | Performed by: INTERNAL MEDICINE

## 2023-09-29 PROCEDURE — 99000 SPECIMEN HANDLING OFFICE-LAB: CPT | Performed by: PATHOLOGY

## 2023-09-29 RX ORDER — NORETHINDRONE ACETATE AND ETHINYL ESTRADIOL 1.5; .03 MG/1; MG/1
1 TABLET ORAL DAILY
COMMUNITY

## 2023-09-29 NOTE — PROGRESS NOTES
New Patient: Genetic Counseling/Cancer Risk Management Nurse Navigator Note    [facility]: [facility]    Referred to (specialty): Genetic Counseling    Requested provider (if applicable): n/a    Date Referral Received: 9/29/2023    Evaluation for :   family hx of breast cancer       Writer received referral, reviewed for   appropriate plan, and sent to New Patient   Scheduling for completion

## 2023-09-29 NOTE — PROGRESS NOTES
Administered Influenza Fluzone vaccine (see Immunizations in Chart Review). Patient tolerated well.          Darien Pennington CMA at 9:45 AM on 9/29/2023

## 2023-09-29 NOTE — NURSING NOTE
Joyce Murry is a 47 year old female that presents in clinic today for the following:     Chief Complaint   Patient presents with    Physical     Pt here for annual physical     Blood Draw     Pt would like to get routine blood draws (lipids) and breast cancer gene screening    A.D.H.D     Pt has had recent adhd diagnoses in household and is curious to get tested       The patient's allergies and medications were reviewed. The patient's vitals were obtained without incident. The patient does not have any other questions for the provider.     Bret Toledo, EMT at 8:52 AM on 9/29/2023.  Primary Care Clinic: [phone]

## 2023-09-29 NOTE — PROGRESS NOTES
History of Present Illness:  Ms. Murry is a 46 year old female who presents for  Chief Complaint   Patient presents with    Physical     Pt here for annual physical     Blood Draw     Pt would like to get routine blood draws (lipids) and breast cancer gene screening    A.D.H.D     Pt has had recent adhd diagnoses in household and is curious to get tested     Joyce has PMH of depression, menstrual migraines    Saw Gyn for physical in May, started on norethindrone for migraines, on the pill continuously    Breast cancer in mom and cousin, sister did genetic testing, negative, mom had breast cancer in  50s x 2  Father  doctor, had heart disease    Concerns about ADD, she reports disorganized, emotional regulation, distractibility, tasks/time management, missing details, giving up on goals, performance issues in school procrastination    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    ROS:  Blurry vision episode after ate lots of sugars    Routine Health Maintenence:  Lung CT: n/a  Colonoscopy (45-75 yrs):   7/22 Impression:               - The examined portion of the ileum was normal.                             - The entire examined colon is normal on direct and                             retroflexion views.                             - No specimens collected.   Recommendation:           - Repeat colonoscopy in 10 years for screening                             purposes.                                           Dexa (>65W or 70M yrs): n/a  Mammogram (40-75 yrs): 6/22, 7/23  Pap (21-65 yrs): 11/20 NIL, HPV neg      Review of external notes as documented above                   A detailed Review of Systems was performed, verified and is negative except as documented in the HPI.  All health questionnaires were reviewed, verified and relevant information documented above.      Past Medical History:  Past Medical  History:   Diagnosis Date    Anxiety     Genital herpes     History of hyperlipidemia, improved as of 2016 with diet and exercise     Menstrual migraine        Active Meds:  Current Outpatient Medications   Medication    KARINA  1.5-30 MG-MCG tablet    tretinoin (RETIN-A) 0.1 % external cream    acyclovir (ZOVIRAX) 200 MG capsule    ALPRAZolam (XANAX) 0.25 MG tablet    tretinoin (RETIN-A) 0.025 % external cream    valACYclovir (VALTREX) 500 MG tablet     No current facility-administered medications for this visit.        Allergies:  Reviewed, refer to EMR    Relevant Social History:  Social History     Tobacco Use    Smoking status: Former     Types: Cigarettes     Quit date: 2002     Years since quittin.6    Smokeless tobacco: Never   Vaping Use    Vaping Use: Never used   Substance Use Topics    Alcohol use: Yes     Comment: 1-2 drink once every 2 weeks    Drug use: Never        Physical Exam:  Vitals: /60 (BP Location: Right arm, Patient Position: Sitting, Cuff Size: Adult Regular)   Pulse 69   Wt 62 kg (136 lb 9.6 oz)   SpO2 99%   BMI 21.72 kg/m    Constitutional: Alert, oriented, pleasant, no acute distress  Head: Normocephalic, atraumatic  Eyes: Extra-ocular movements intact, pupils equally round bilaterally, no scleral icterus  ENT: Oropharynx clear, moist mucus membranes, good dentition, no thyromegaly  Neck: Supple, no lymphadenopathy  Cardiovascular: Regular rate and rhythm, no murmurs, rubs or gallops, peripheral pulses full/symmetric  Respiratory: Good air movement bilaterally, lungs clear, no wheezes/rales/rhonchi  GI: Abdomen soft, bowel sounds present, nondistended, nontender, no organomegaly or masses, no rebound/guarding  Musculoskeletal: No edema, normal muscle tone, normal gait  Neurologic: Alert and oriented, cranial nerves 2-12 intact, grossly non-focal  Skin: No rashes/lesions  Psychiatric: normal mentation, affect and mood      Diagnostics:  Labs reviewed in  Epic          Assessment and Plan:  oJyce was seen today for physical, blood draw and a.d.h.d.    Diagnoses and all orders for this visit:    Screening for hyperlipidemia  -     Lipid panel reflex to direct LDL Fasting; Future    Screening for diabetes mellitus  -     Hemoglobin A1c; Future    Screening for viral disease  -     HIV Antigen Antibody Combo; Future  -     Hepatitis C Screen Reflex to HCV RNA Quant and Genotype; Future  -     Hepatitis B Surface Antibody; Future    Routine general medical examination at a health care facility  -     Comprehensive metabolic panel (BMP + Alb, Alk Phos, ALT, AST, Total. Bili, TP); Future    Family history of malignant neoplasm of breast  -     Adult Oncology/Hematology  Referral; Future    Inattention  Advised diagnostic testing for ADD, consider medication pending results and further discussion  -     Adult Mental Health  Referral; Future    Need for influenza vaccination  -     INFLUENZA VACCINE >6 MONTHS (AFLURIA/FLUZONE)        Odalis Pearson MD  Internal Medicine

## 2023-09-30 LAB
HBV SURFACE AB SERPL IA-ACNC: 0 M[IU]/ML
HBV SURFACE AB SERPL IA-ACNC: NONREACTIVE M[IU]/ML
HCV AB SERPL QL IA: NONREACTIVE
HIV 1+2 AB+HIV1 P24 AG SERPL QL IA: NONREACTIVE

## 2023-12-18 NOTE — PROGRESS NOTES
Virtual Visit Details    Type of service:  Video Visit     Originating Location (pt. Location): Home  Distant Location (provider location):  Off-site  Platform used for Video Visit: Philipp   Time spent over video: 48 minutes    12/19/2023    Referring Provider: Odalis Pearson MD    Presenting Information:   I spoke with Joyce Murry over video today for genetic counseling to discuss her family history of cancer. This appointment was conducted virtually due to COVID-19 precautions. We talked today to review this history, cancer screening recommendations, and available genetic testing options.    Personal History:  Joyce is a 48 year old female. She does not have any personal history of cancer.     She had her first menstrual period at age 11 or 12, her first child at age 33, and is premenopausal. Joyce has her ovaries, fallopian tubes and uterus in place. She reports that she has not used hormone replacement therapy. She has recently started using oral contraceptives for the first time about 6-7 months ago to help with hormonal migraines. She has clinical breast exams and mammograms; her most recent mammogram on 7/17/23 was negative. A left breast biopsy on 5/21/14 revealed fibroadenoma. Her most recent colonoscopy on 7/12/22 detected no polyps and follow-up was recommended in 10 years. A previous colonoscopy on 12/19/17 also detected no polyps. Joyce reported former tobacco use from age [age] and social alcohol use of about 1 drink per week or less.    Family History: (Please see scanned pedigree for detailed family history information)  Siblings:    She has two sisters (ages 55 and 53), both with no known history of cancer. Her 53 year old sister has reportedly had negative genetic testing within the last year.  Maternal:    Her mother was diagnosed with breast cancer at age 50. Treatment included chemotherapy and radiation. She was then diagnosed with a second breast cancer in the other breast in approximately her  late 50s. Treatment again included chemotherapy and radiation. She was diagnosed with CLL in her mid 60s or early 70s. She possibly also had a skin cancer on her arm. She passed away at age 82 due to breast cancer that had recurred and metastasized.    Her uncle was diagnosed with pancreatic cancer and passed away in his late 80s.    Her grandfather was diagnosed with lung cancer and passed away at age 76 or 77. He had a history of smoking and worked in coal mines.   Paternal:    Her father passed away at age 84 with no known history of cancer.     Her aunt was diagnosed with a cancer that was widely metastatic at the time of diagnosis. It was thought that this was breast cancer, although this is not certain. She passed away in her 70s.     Another aunt passed away in her 70s due to alzheimer's disease. Joyce believes that she also had breast cancer.     Her daughter (Joyce's cousin) is 52 years old and was recently diagnosed with breast cancer at age 52. She is currently having treatment.     Her grandmother was diagnosed with breast cancer at an unknown age. She passed away in her late 70s.     Her grandfather had hodgkin's lymphoma and passed away in his 40s or early 50s.    Joyce is not aware of any genetic testing in any of her family members, except her sister as noted above.    Her maternal ethnicity is Mongolian. Her paternal ethnicity is Beninese. There is no known Ashkenazi Scientology ancestry on either side of her family.     Discussion:    Joyce's family history of cancer is suggestive of a hereditary cancer syndrome.    We reviewed the features of sporadic, familial, and hereditary cancers. In looking at Joyce's family history, it is possible that a cancer susceptibility gene is present due to her maternal family history of bilateral breast cancer in her mother and pancreatic cancer in her uncle, as well as her paternal family history of three generations of women with breast cancer.    We discussed the  natural history and genetics of hereditary cancer. Based on her family history, we discussed the BRCA1 and BRCA2 genes. Mutations in these genes cause a condition known as Hereditary Breast and Ovarian Cancer syndrome (HBOC). Women with a mutation in either of these genes are at increased risk for breast and ovarian cancer. There is also an increased risk for a second primary breast cancer. Men with a mutation in either of these genes are at increased risk for breast and prostate cancer. Both women and men may also be at increased risk for pancreatic cancer and melanoma. A detailed handout regarding these genes and other genes in which mutations are associated with an increased risk for breast cancer will be provided to Joyce via [employer] and can be found in the after visit summary. Topics included: inheritance pattern, cancer risks, cancer screening recommendations, and also risks, benefits and limitations of testing.      Based on her personal and family history, Joyce meets current National Comprehensive Cancer Network (NCCN) criteria for genetic testing of high-penetrance breast cancer susceptibility genes, specifically, BRCA1, BRCA2, CDH1, PALB2, PTEN, and TP53.       We discussed that there are additional genes that could cause increased risk for breast, pancreatic, and other cancers. As many of these genes present with overlapping features in a family and accurate cancer risk cannot always be established based upon the pedigree analysis alone, it would be reasonable for Joyce to consider panel genetic testing to analyze multiple genes at once.    We reviewed genetic testing options for Joyce based on her personal and family history: a panel of genes associated with an increased risk for certain cancers, or larger panel options to include genes associated with increased risk for multiple different cancer types. She expressed an interest in more broad testing and opted for the CancerNext Panel.  Genetic testing  is available for 36 genes associated with hereditary cancer: CancerNext (APC, TANVI, AXIN2, BARD1, BMPR1A, BRCA1, BRCA2, BRIP1, CDH1, CDK4, CDKN2A, CHEK2, DICER1, EPCAM, GREM1, HOXB13, MLH1, MSH2, MSH3, MSH6, MUTYH, NBN, NF1, NTHL1, PALB2, PMS2, POLD1, POLE, PTEN, RAD51C, RAD51D, RECQL, SMAD4, SMARCA4, STK11, and TP53).  We discussed that many of the genes in the CancerNext panel are associated with specific hereditary cancer syndromes and published management guidelines: Hereditary Breast and Ovarian Cancer syndrome (BRCA1, BRCA2), Owens syndrome (MLH1, MSH2, MSH6, PMS2, EPCAM), Familial Adenomatous Polyposis (APC), Hereditary Diffuse Gastric Cancer (CDH1), Familial Atypical Multiple Mole Melanoma syndrome (CDK4, CDKN2A), Juvenile Polyposis syndrome (BMPR1A, SMAD4), Cowden syndrome (PTEN), Li Fraumeni syndrome (TP53), Peutz-Jeghers syndrome (STK11), MUTYH Associated Polyposis (MUTYH), and Neurofibromatosis type 1 (NF1).   The TANVI, AXIN2, BRIP1, CHEK2, GREM1, MSH3, NBN, NTHL1, PALB2, POLD1, POLE, RAD51C, and RAD51D genes are associated with increased cancer risk and have published management guidelines for certain cancers.    The remaining genes (BARD1, DICER1, HOXB13, RECQL, and SMARCA4) are associated with increased cancer risk and may allow us to make medical recommendations when mutations are identified.      Due to COVID-19 precautions consent was obtained over the phone/video today. Genetic testing via the CancerNext Panel [facility]. Joyce opted to schedule a blood draw for testing. Turnaround time from date when sample is received at the lab: approximately 3-4 weeks.    Medical Management: For Joyce, we reviewed that the information from genetic testing may determine:    additional cancer screening for which Joyce may qualify (i.e. mammogram and breast MRI, more frequent colonoscopies, more frequent dermatologic exams, etc.),    options for risk reducing surgeries Joyce could  consider (i.e. bilateral mastectomy, surgery to remove her ovaries and/or uterus, etc.),      and targeted chemotherapies if she were to develop certain cancers in the future (i.e. immunotherapy for individuals with Owens syndrome, PARP inhibitors, etc.).     These recommendations will be discussed in detail once genetic testing is completed.     Plan:  1) Today Joyce elected to proceed with genetic testing via the CancerNext Panel offered by [facility].  2) This information should be available in 4-5 weeks.  3) Joyce will be scheduled for a virtual visit (phone or video) to discuss the results.    Savannah Mccarty MS, [facility]  Licensed, Certified Genetic Counselor  Office: [phone]  Email: [email or web address]

## 2023-12-19 ENCOUNTER — VIRTUAL VISIT (OUTPATIENT)
Dept: ONCOLOGY | Facility: CLINIC | Age: 48
End: 2023-12-19
Attending: INTERNAL MEDICINE
Payer: COMMERCIAL

## 2023-12-19 DIAGNOSIS — Z80.0 FAMILY HISTORY OF PANCREATIC CANCER: ICD-10-CM

## 2023-12-19 DIAGNOSIS — Z80.3 FAMILY HISTORY OF MALIGNANT NEOPLASM OF BREAST: Primary | ICD-10-CM

## 2023-12-19 PROCEDURE — 96040 HC GENETIC COUNSELING, EACH 30 MINUTES: CPT | Mod: GT,95 | Performed by: GENETIC COUNSELOR, MS

## 2023-12-19 NOTE — NURSING NOTE
Is the patient currently in the state of [place]? YES    Visit mode:VIDEO    If the visit is dropped, the patient can be reconnected by: VIDEO VISIT: Send to e-mail at: [email or web address]    Will anyone else be joining the visit? NO  (If patient encounters technical issues they should call [phone]    How would you like to obtain your AVS? MyChart    Are changes needed to the allergy or medication list? N/A    Reason for visit: Consult    Joan ARANGO

## 2023-12-19 NOTE — LETTER
12/19/2023         RE: Joyce Murry  [address]  [facility] [address]        Dear Colleague,    Thank you for referring your patient, Joyce Murry, to the [facility]. Please see a copy of my visit note below.    Virtual Visit Details    Type of service:  Video Visit     Originating Location (pt. Location): Home  Distant Location (provider location):  Off-site  Platform used for Video Visit: Nanol[employer]   Time spent over video: 48 minutes    12/19/2023    Referring Provider: Odalis Pearson MD    Presenting Information:   I spoke with Joyce Murry over video today for genetic counseling to discuss her family history of cancer. This appointment was conducted virtually due to COVID-19 precautions. We talked today to review this history, cancer screening recommendations, and available genetic testing options.    Personal History:  Joyce is a 48 year old female. She does not have any personal history of cancer.     She had her first menstrual period at age 11 or 12, her first child at age 33, and is premenopausal. oJyce has her ovaries, fallopian tubes and uterus in place. She reports that she has not used hormone replacement therapy. She has recently started using oral contraceptives for the first time about 6-7 months ago to help with hormonal migraines. She has clinical breast exams and mammograms; her most recent mammogram on 7/17/23 was negative. A left breast biopsy on 5/21/14 revealed fibroadenoma. Her most recent colonoscopy on 7/12/22 detected no polyps and follow-up was recommended in 10 years. A previous colonoscopy on 12/19/17 also detected no polyps. Joyce reported former tobacco use from age [age] and social alcohol use of about 1 drink per week or less.    Family History: (Please see scanned pedigree for detailed family history information)  Siblings:  She has two sisters (ages 55 and 53), both with no known history of cancer. Her 53 year old sister has reportedly had  negative genetic testing within the last year.  Maternal:  Her mother was diagnosed with breast cancer at age 50. Treatment included chemotherapy and radiation. She was then diagnosed with a second breast cancer in the other breast in approximately her late 50s. Treatment again included chemotherapy and radiation. She was diagnosed with CLL in her mid 60s or early 70s. She possibly also had a skin cancer on her arm. She passed away at age 82 due to breast cancer that had recurred and metastasized.  Her uncle was diagnosed with pancreatic cancer and passed away in his late 80s.  Her grandfather was diagnosed with lung cancer and passed away at age 76 or 77. He had a history of smoking and worked in coal mines.   Paternal:  Her father passed away at age 84 with no known history of cancer.   Her aunt was diagnosed with a cancer that was widely metastatic at the time of diagnosis. It was thought that this was breast cancer, although this is not certain. She passed away in her 70s.   Another aunt passed away in her 70s due to alzheimer's disease. Joyce believes that she also had breast cancer.   Her daughter (Joyce's cousin) is 52 years old and was recently diagnosed with breast cancer at age 52. She is currently having treatment.   Her grandmother was diagnosed with breast cancer at an unknown age. She passed away in her late 70s.   Her grandfather had hodgkin's lymphoma and passed away in his 40s or early 50s.    Joyce is not aware of any genetic testing in any of her family members, except her sister as noted above.    Her maternal ethnicity is Israeli. Her paternal ethnicity is [place]. There is no known Ashkenazi Sikh ancestry on either side of her family.     Discussion:  Joyce's family history of cancer is suggestive of a hereditary cancer syndrome.  We reviewed the features of sporadic, familial, and hereditary cancers. In looking at Joyce's family history, it is possible that a cancer susceptibility gene is  present due to her maternal family history of bilateral breast cancer in her mother and pancreatic cancer in her uncle, as well as her paternal family history of three generations of women with breast cancer.  We discussed the natural history and genetics of hereditary cancer. Based on her family history, we discussed the BRCA1 and BRCA2 genes. Mutations in these genes cause a condition known as Hereditary Breast and Ovarian Cancer syndrome (HBOC). Women with a mutation in either of these genes are at increased risk for breast and ovarian cancer. There is also an increased risk for a second primary breast cancer. Men with a mutation in either of these genes are at increased risk for breast and prostate cancer. Both women and men may also be at increased risk for pancreatic cancer and melanoma. A detailed handout regarding these genes and other genes in which mutations are associated with an increased risk for breast cancer will be provided to Joyce via [facility] and can be found in the after visit summary. Topics included: inheritance pattern, cancer risks, cancer screening recommendations, and also risks, benefits and limitations of testing.    Based on her personal and family history, Joyce meets current National Comprehensive Cancer Network (NCCN) criteria for genetic testing of high-penetrance breast cancer susceptibility genes, specifically, BRCA1, BRCA2, CDH1, PALB2, PTEN, and TP53.     We discussed that there are additional genes that could cause increased risk for breast, pancreatic, and other cancers. As many of these genes present with overlapping features in a family and accurate cancer risk cannot always be established based upon the pedigree analysis alone, it would be reasonable for Joyce to consider panel genetic testing to analyze multiple genes at once.  We reviewed genetic testing options for Joyce based on her personal and family history: a panel of genes associated with an increased risk for  certain cancers, or larger panel options to include genes associated with increased risk for multiple different cancer types. She expressed an interest in more broad testing and opted for the CancerNext Panel.  Genetic testing is available for 36 genes associated with hereditary cancer: CancerNext (APC, TANVI, AXIN2, BARD1, BMPR1A, BRCA1, BRCA2, BRIP1, CDH1, CDK4, CDKN2A, CHEK2, DICER1, EPCAM, GREM1, HOXB13, MLH1, MSH2, MSH3, MSH6, MUTYH, NBN, NF1, NTHL1, PALB2, PMS2, POLD1, POLE, PTEN, RAD51C, RAD51D, RECQL, SMAD4, SMARCA4, STK11, and TP53).  We discussed that many of the genes in the CancerNext panel are associated with specific hereditary cancer syndromes and published management guidelines: Hereditary Breast and Ovarian Cancer syndrome (BRCA1, BRCA2), Owens syndrome (MLH1, MSH2, MSH6, PMS2, EPCAM), Familial Adenomatous Polyposis (APC), Hereditary Diffuse Gastric Cancer (CDH1), Familial Atypical Multiple Mole Melanoma syndrome (CDK4, CDKN2A), Juvenile Polyposis syndrome (BMPR1A, SMAD4), Cowden syndrome (PTEN), Li Fraumeni syndrome (TP53), Peutz-Jeghers syndrome (STK11), MUTYH Associated Polyposis (MUTYH), and Neurofibromatosis type 1 (NF1).   The TANVI, AXIN2, BRIP1, CHEK2, GREM1, MSH3, NBN, NTHL1, PALB2, POLD1, POLE, RAD51C, and RAD51D genes are associated with increased cancer risk and have published management guidelines for certain cancers.    The remaining genes (BARD1, DICER1, HOXB13, RECQL, and SMARCA4) are associated with increased cancer risk and may allow us to make medical recommendations when mutations are identified.    Due to COVID-19 precautions consent was obtained over the phone/video today. Genetic testing via the CancerNext Panel will be sent to [facility]. Joyce opted to schedule a blood draw for testing. Turnaround time from date when sample is received at the lab: approximately 3-4 weeks.  Medical Management: For Joyce, we reviewed that the information from genetic testing may  determine:  additional cancer screening for which Joyce may qualify (i.e. mammogram and breast MRI, more frequent colonoscopies, more frequent dermatologic exams, etc.),  options for risk reducing surgeries Joyce could consider (i.e. bilateral mastectomy, surgery to remove her ovaries and/or uterus, etc.),    and targeted chemotherapies if she were to develop certain cancers in the future (i.e. immunotherapy for individuals with Owens syndrome, PARP inhibitors, etc.).   These recommendations will be discussed in detail once genetic testing is completed.     Plan:  1) Today Joyce elected to proceed with genetic testing via the CancerNext Panel offered by [email or web address].  2) This information should be available in 4-5 weeks.  3) Joyce will be scheduled for a virtual visit (phone or video) to discuss the results.    Savannah Mccarty MS, [facility] [place]ensed, Certified Genetic Counselor  Office: [phone]  Email: [email or web address]

## 2023-12-20 ENCOUNTER — LAB (OUTPATIENT)
Dept: LAB | Facility: CLINIC | Age: 48
End: 2023-12-20
Payer: COMMERCIAL

## 2023-12-20 DIAGNOSIS — Z80.0 FAMILY HISTORY OF PANCREATIC CANCER: ICD-10-CM

## 2023-12-20 DIAGNOSIS — Z80.3 FAMILY HISTORY OF MALIGNANT NEOPLASM OF BREAST: ICD-10-CM

## 2023-12-20 PROCEDURE — 99000 SPECIMEN HANDLING OFFICE-LAB: CPT

## 2023-12-20 PROCEDURE — 36415 COLL VENOUS BLD VENIPUNCTURE: CPT

## 2023-12-20 NOTE — PATIENT INSTRUCTIONS
Assessing Cancer Risk  Cancer is a common diagnosis which impacts many families.  Individuals may develop cancer due to environmental factors (such as exposures and lifestyle), aging, genetic predisposition, or a combination of these factors.      Only about 5-10% of cancers are thought to be due to an inherited cancer susceptibility gene.    These families often have:  Several people with the same or related types of cancer  Cancers diagnosed at a young age (before age 50)  Individuals with more than one primary cancer  Multiple generations of the family affected with cancer    Comprehensive Breast and Gynecologic Cancer Panel  We each inherit two copies of every gene in our bodies: one from our mother, and one from our father. Each gene has a specific job to do.  When a gene has a mistake or  mutation  in it, it does not work like it should.     Some people may be candidates for genetic testing of more than one gene.  For these families, genetic testing using a cancer panel may be offered. These panels will test different genes at once known to increase the risk for breast, ovarian, uterine, and/or other cancers.    This handout will review common hereditary breast and gynecologic cancer syndromes. The genes that will be discussed in this handout are: TANVI, BRCA1, BRCA2, BRIP1, CDH1, CHEK2, MLH1, MSH2, MSH6, PMS2, EPCAM, PTEN, PALB2, RAD51C, RAD51D, and TP53.    The purpose of this handout is to serve as a brief summary of the breast and gynecologic cancer risk genes that have published clinical management guidelines for individuals who are found to carry a mutation. Inheriting a mutation does not mean a person will develop cancer, but it does significantly increase their risk above the general population risk.     ______________________________________________________________________________    Hereditary Breast and Ovarian Cancer Syndrome (BRCA1 and BRCA2)  A single mutation in one of the copies of BRCA1 or  BRCA2 increases the risk for breast and ovarian cancer, among others.  The risk for pancreatic cancer and melanoma may also be slightly increased in some families.  The chart below shows the chance that someone with a BRCA mutation would develop cancer in his or her lifetime1,2,3,4.       Lifetime Cancer Risks    General Population BRCA1  BRCA2   Breast  12% >60% >60%   Ovarian  1-2% 39-58% 13-29%   Prostate 12% 7-26% 19-61%   Male Breast 0.1% 0.2-1.2% 1.8-7.1%   Pancreas 1-2% Up to 5% 5-10%     A person s ethnic background is also important to consider, as individuals of Ashkenazi Hinduism ancestry have a higher chance of having a BRCA gene mutation.  There are three BRCA mutations that occur more frequently in this population.      Owens Syndrome (MLH1, MSH2, MSH6, PMS2, and EPCAM)  Currently five genes are known to cause Owens Syndrome: MLH1, MSH2, MSH6, PMS2, and EPCAM.  A single mutation in one of the Owens Syndrome genes increases the risk for colon, endometrial, ovarian, and stomach cancers.  Other cancers that occur less commonly in Owens Syndrome include urinary tract, skin, and brain cancers.  The chart below shows the chance that a person with Owens syndrome would develop cancer in his or her lifetime5.      Lifetime Cancer Risks    General Population Owens Syndrome   Colon 5% 10-61%   Endometrial 3% 13-57%   Ovarian 1-2% 1-38%   Stomach <1% 1-9%   *Cancer risk varies depending on Owens syndrome gene found      Cowden Syndrome (PTEN)  Cowden syndrome is a hereditary condition that increases the risk for breast, thyroid, endometrial, colon, and kidney cancer.  Cowden syndrome is caused by a mutation in the PTEN gene.  A single mutation in one of the copies of PTEN causes Cowden syndrome and increases cancer risk.  The chart below shows the chance that someone with a PTEN mutation would develop cancer in their lifetime6,7.  Other benign features seen in some individuals with Cowden syndrome include benign  skin lesions (facial papules, keratoses, lipomas), learning disability, autism, thyroid nodules, colon polyps, and larger head size.     Lifetime Cancer Risks    General Population Cowden   Breast 12% 40-60%*   Thyroid 1% Up to 38%   Renal 1-2% Up to 35%   Endometrial 3% Up to 28%   Colon 5% Up to 9%   Melanoma 2-3% Up to 6%   *Emerging data suggests the risk for breast cancer could be greater than 60%               Li-Fraumeni Syndrome (TP53)  Li-Fraumeni Syndrome (LFS) is a cancer predisposition syndrome caused by a mutation in the TP53 gene. A single mutation in one of the copies of TP53 increases the risk for multiple cancers. Individuals with LFS are at an increased risk for developing cancer at a young age. The lifetime risk for development of a LFS-associated cancer is 50% by age 30 and 90% by age 60.   Core Cancers: Sarcomas, Breast, Brain, Lung, Leukemias/Lymphomas, Adrenocortical carcinomas  Other Cancers: Gastrointestinal, Thyroid, Skin, Genitourinary       Hereditary Diffuse Gastric Cancer (CDH1)  Currently, one gene is known to cause hereditary diffuse gastric cancer (HDGC): CDH1.  Individuals with HDGC are at increased risk for diffuse gastric cancer and lobular breast cancer. Of people diagnosed with HDGC, 30-50% have a mutation in the CDH1 gene.  This suggests there are likely other genes that may cause HDGC that have not been identified yet.      Lifetime Cancer Risks    General Population HDGC   Diffuse Gastric  <1% ~80%   Breast 12% 41-60%       Additional Genes    TANVI  TANVI is a moderate-risk breast cancer gene. Women who have a mutation in TANVI can have between a 2-4 fold increased risk for breast cancer compared to the general population8. TANVI mutations have also been associated with increased risk for pancreatic cancer between 5-10%9. Individuals who inherit two TANVI mutations have a condition called ataxia-telangiectasia (AT).  This rare autosomal recessive condition affects the nervous system  and immune system, and is associated with progressive cerebellar ataxia beginning in childhood. Individuals with ataxia-telangiectasia often have a weakened immune system and have an increased risk for childhood cancers.    PALB2  Mutations in PALB2 have been shown to increase the risk of breast cancer up to 41-60% in some families; where individuals fall within this risk range is dependent upon family [id number]. PALB2 mutations have also been associated with increased risk for pancreatic cancer between 5-10%.  Individuals who inherit two PALB2 mutations--one from their mother and one from their father--have a condition called Fanconi Anemia.  This rare autosomal recessive condition is associated with short stature, developmental delay, bone marrow failure, and increased risk for childhood cancers.    CHEK2   CHEK2 is a moderate-risk breast cancer gene.  Women who have a mutation in CHEK2 have around a 2-4 fold increased risk for breast cancer compared to the general population, and this risk may be higher depending upon family history.11,12,13 The risk of colon cancer may be twice as high as the general population risk of colon cancer of 5%. Mutations in CHEK2 have also been shown to increase the risk of other cancers, including prostate, however these cancer risks are currently not well understood.    BRIP1, RAD51C and RAD51D  Mutations in RAD51C and RAD51D have been shown to increase the risk of ovarian cancer and breast cancer 14,. Mutations in BRIP1 have been shown to increase the risk of ovarian cancer and possibly female breast cancer 15 .       Lifetime Cancer Risk    General Population        BRIP1   RAD51C  RAD51D   Breast 12% Not well defined 20-40% 20-40%   Ovarian 1-2% 5-15% 10-15% 10-20%     ______________________________________________________________  Inheritance  All of the cancer syndromes reviewed above are inherited in an autosomal dominant pattern.  This means that if a parent has a mutation,  each of their children will have a 50% chance of inheriting that same mutation. Therefore, each child --male or female-- would have a 50% chance of being at increased risk for developing cancer.    Image obtained from Genetics Home Reference, 2013     Mutations in some genes can occur de rigoberto, which means that a person s mutation occurred for the first time in them and was not inherited from a parent.  Now that they have the mutation, however, it can be passed on to future generations.    Genetic Testing  Genetic testing involves a blood test and will look for any harmful mutations that are associated with increased cancer risk.  If possible, it is recommended that the person(s) who has had cancer be tested before other family members.  That person will give us the most useful information about whether or not a specific gene is associated with the cancer in the family.    Results  There are three possible results of genetic testing:  Positive--a harmful mutation was identified in one or more of the genes  Negative--no mutations were identified in any of the genes tested  Variant of unknown significance--a variation in one of the genes was identified, but it is unclear how this impacts cancer risk in the family    Advantages and Disadvantages   There are advantages and disadvantages to genetic testing.    Advantages  May clarify your cancer risk  Can help you make medical decisions  May explain the cancers in your family  May give useful information to your family members (if you share your results)    Disadvantages  Possible negative emotional impact of learning about inherited cancer risk  Uncertainty in interpreting a negative test result in some situations  Possible genetic discrimination concerns (see below)    Genetic Information Nondiscrimination Act (RICH)  The Genetic Information Nondiscrimination Act of 2008 (RICH) is a federal law that protects individuals from health insurance or employment discrimination  based on a genetic test result alone (with some exceptions, including employers with fewer than 15 employees, and ).  Although rare, RICH  does not cover discrimination protections in terms of life insurance, long term care, or disability insurances.  Visit the National Human [employer] [place] website to learn more.    Reducing Cancer Risk  All of the genes described in this handout have nationally recognized cancer screening guidelines that would be recommended for individuals who test positive.  In addition to increased cancer screening, surgeries may be offered or recommended to reduce cancer risk.  Recommendations are based upon an individual s genetic test result as well as their personal and family history of cancer.    Questions to Think About Regarding Genetic Testing:  What effect will the test result have on me and my relationship with my family members if I have an inherited gene mutation?  If I don t have a gene mutation?  Should I share my test results, and how will my family react to this news, which may also affect them?  Are my children ready to learn new information that may one day affect their own health?    Hereditary Cancer Resources    FORCE: Facing Our Risk of Cancer Empowered facingourrisk.org   Bright Pink bebrightpink.org   Li-Fraumeni Syndrome Association lfsassociation.org   PTEN World PTENworld.com   No stomach for cancer, Inc. nostomachforcancer.org   Stomach cancer relief network Scrnet.org   Collaborative Group of the Americas on Inherited Colorectal Cancer (CGA) cgaicc.com    Cancer Care cancercare.org   American Cancer Society (ACS) cancer.org   National Cancer [place] (NCI) cancer.gov     Please call us if you have any questions or concerns.   Cancer Risk Management Program [phone]-CANCER ([phone])  Amandeep Melendez, MS [facility]  [phone]  Falguni German, MS, [facility] [phone]  Teresa Velasquez, MS, [facility]  [phone]  Megan Martinez, MS, [facility]  [phone]  Savannah Mccarty,  MS, [facility] [place]  [phone]  Lynn Simental, MS, [facility] [place] [phone]  Tran Morris, MS, [facility] [place] [phone]    References  Chevy Alfred PDP, Amanda S, Larry ORDOÑEZ, Estefani JE, Hillary JL, Delia N, Irma H, Ronnell O, Nusrat A, Pasini B, Radicarlos P, Manlaine S, Nicki DM, Carmichael N, Kale E, Nelsy H, Russell E, Geraldo J, Gronwalter J, Christian B, Tulinius H, Thorlacius S, Eerola H, Nevanlinna H, Fermin K, Marianela OP. Average risks of breast and ovarian cancer associated with BRCA1 or BRCA2 mutations detected in case series unselected for family history: a combined analysis of 222 studies. Am J Hum Mary Carmen. 2003;72:1117-30.  Meche N, Maria Luisa M, Kamilah G.  BRCA1 and BRCA2 Hereditary Breast and Ovarian Cancer. Gene Reviews online. 2013.  Wilver YC, Paul S, Pelon G, Marshall S. Breast cancer risk among male BRCA1 and BRCA2 mutation carriers. J Natl Cancer Inst. 2007;99:1811-4.  Familia FINNEY, Norman I, Nikunj J, Lavonne E, Katina ER, Marc F. Risk of breast cancer in male BRCA2 carriers. J Med Mary Carmen. 2010;47:710-1.  National Comprehensive Cancer Network. Clinical practice guidelines in oncology, colorectal cancer screening. Available online (registration required). 2015.  Nils MH, Meghan J, Lou J, Christiano WILD, Eduardo MS, Eng C. Lifetime cancer risks in individuals with germline PTEN mutations. Clin Cancer Res. 2012;18:400-7.  Chidi R. Cowden Syndrome: A Critical Review of the Clinical Literature. J Mary Carmen . 2009:18:13-27.  Erin GELLER, Obed PEREZ, Lawanda S, Lovely P, Liya T, Aline M, Galo B, Farzana H, Nba R, Mary K, Wally L, Familia FINNEY, Nicki PEREZ, Abdullahi DF, Jefe MR, The Breast Cancer Susceptibility Collaboration (UK) & Maxwell ARIAS. TANVI mutations that cause ataxia-telangiectasia are breast cancer susceptibility alleles. Nature Genetics. 2006;38:873-875  Arsh N , Tyson Y, Polina J, Aldo L, Kendall GUTHRIE , Berny ML, Roxi S, Jimmy AG, Heidi S, Sohan ML, Marianne J , Mykel R, Chanel JANE, Jeanmarie  JR, Venus VE, Sofya M, Vowendystein B, Chanda N, Katherine RH, Jamal KW, and Hiwot AP. TANVI mutations in patients with hereditary pancreatic cancer. Cancer Discover. 2012;2:41-46  Imani ERAZO., et al. Breast-Cancer Risk in Families with Mutations in PALB2. NEJM. 2014; 371(6):497-506.  CHEK2 Breast Cancer Case-Control Consortium. CHEK2*1100delC and susceptibility to breast cancer: A collaborative analysis involving 10,860 breast cancer cases and 9,065 controls from 10 studies. Am J Hum Mary Carmen, 74 (2004), pp. 9700-5408  Araseli T, Delia S, Karen K, et al. Spectrum of Mutations in BRCA1, BRCA2, CHEK2, and TP53 in Families at High Risk of Breast Cancer. JOSE MANUEL. 2006;295(12):3974-7327.   Figueroa C, Karyn D, Tish GELLER, et al. Risk of breast cancer in women with a CHEK2 mutation with and without a family history of breast cancer. J Clin Oncol. 2011;29:4978-4992.  Song H, Lalits E, Ramus SJ, et al. Contribution of germline mutations in the RAD51B, RAD51C, and RAD51D genes to ovarian cancer in the population. J Clin Oncol. 2015;33(26):2381-2048. Doi:10.1200/JCO.2015.61.2408.  Georgie T, June DF, Bree P, et al. Mutations in BRIP1 confer high risk of ovarian cancer. Daisha Mary Carmen. 2011;43(11):5096-9494. doi:10.1038/ng.955.

## 2024-01-02 LAB — SCANNED LAB RESULT: NORMAL

## 2024-01-11 ENCOUNTER — VIRTUAL VISIT (OUTPATIENT)
Dept: ONCOLOGY | Facility: CLINIC | Age: 49
End: 2024-01-11
Attending: GENETIC COUNSELOR, MS
Payer: COMMERCIAL

## 2024-01-11 DIAGNOSIS — Z80.3 FAMILY HISTORY OF MALIGNANT NEOPLASM OF BREAST: Primary | ICD-10-CM

## 2024-01-11 DIAGNOSIS — Z80.0 FAMILY HISTORY OF PANCREATIC CANCER: ICD-10-CM

## 2024-01-11 PROCEDURE — 96040 HC GENETIC COUNSELING, EACH 30 MINUTES: CPT | Mod: GT,95 | Performed by: GENETIC COUNSELOR, MS

## 2024-01-11 NOTE — LETTER
"    1/11/2024         RE: Joyce Murry  [address]  [facility] [address]        Dear Colleague,    Thank you for referring your patient, Joyce Murry, to the [facility]. Please see a copy of my visit note below.    Virtual Visit Details    Type of service:  Video Visit     Originating Location (pt. Location): Home  Distant Location (provider location):  Off-site  Platform used for Video Visit: [employer]   Time spent over video: 18 minutes    1/11/2024    Referring Provider: Odalis Pearson MD    Presenting Information:  I spoke to Joyce over video today to discuss her genetic testing results. Her blood was drawn on 12/20/23. The CancerNext Panel was ordered from [employer]. This testing was done because of Joyce's family history of cancer.    Genetic Testing Result: NEGATIVE  Joyce is negative for mutations in the 36 genes analyzed: APC, TANVI, BARD1, BMPR1A, BRCA1, BRCA2, BRIP1, CDH1, CDK4, CDKN2A, CHEK2, DICER1, MLH1, MSH2, MSH6, MUTYH, NBN, NF1, NTHL1, PALB2, PMS2, PTEN, RAD51C, RAD51D, RECQL, SMAD4, SMARCA4, STK11 and TP53 (sequencing and deletion/duplication); AXIN2, HOXB13, MSH3, POLD1 and POLE (sequencing only); EPCAM and GREM1 (deletion/duplication only).     A copy of the test report can be found in the Laboratory tab, dated 12/20/23, and named \"LABORATORY MISCELLANEOUS ORDER\". The report is scanned in as a linked document.    Interpretation:  We discussed several different interpretations of this negative test result.    One explanation may be that there is a different gene or combination of genes and environment that are associated with the cancers in this family.  It is possible that her relatives have a mutation in one of these genes and she did not inherit it.  There is also a small possibility that there is a mutation in one of these genes, and the testing laboratory could not find it with their current testing methods.       Screening:  Based on this negative " test result, it is important for Joyce and her relatives to refer back to the family history for appropriate cancer screening.    Today Joyce had some updates to the family history information she shared at our initial visit. She reports that her paternal aunt who had breast cancer (metastatic cancer, thought to be breast), also had pancreatic cancer. She also reported that her paternal aunt who she previously thought may have had breast cancer (the mother of her cousin who currently has breast cancer) did have breast cancer.   Based on the personal and family history information she provided, Joyce has an estimated 20.4% lifetime risk of developing breast cancer based on the LELAND Risk Evaluation v8 model. As such, Joyce meets current National Comprehensive Cancer Network (NCCN) guidelines for high risk breast screening. This includes annual breast MRI in addition to annual mammogram, alternating every six months. In addition, Joyce should be receiving clinical breast exams by her physician. We discussed that Joyce could participate in our Cancer Risk Management Program in which our nursing specialist provides an individual screening plan and assists with medical management. A referral was made to see FALLON Monae for this service.  Other population cancer screening options, such as those recommended by the American Cancer Society and the National Comprehensive Cancer Network (NCCN), are also appropriate for Joyce and her family. These screening recommendations may change if there are changes to Joyce's personal and/or family history of cancer. Final screening recommendations should be made by each individual's primary care provider.       Inheritance:  We reviewed the autosomal dominant inheritance of mutations in these genes. We discussed that Joyce cannot/did not pass on an identifiable mutation in these genes to her children based on this test result. Mutations in these genes do not skip generations.       Additional Testing Considerations:  Although Joyce's genetic testing result was negative, other relatives may still carry a gene mutation associated with an increased risk for cancer. Genetic counseling is recommended for her sister, maternal relatives, and paternal relatives to discuss genetic testing options. If any of her relatives do pursue genetic testing, Joyce is encouraged to contact me so that we may review the impact of their test results on her.    Summary:  We do not have an explanation for Joyce's family history of cancer. While no genetic changes were identified, Joyce may still be at risk for certain cancers due to family history, environmental factors, or other genetic causes not identified by this test. Because of that, it is important that she continue with cancer screening based on her personal and family history as discussed above.    Genetic testing is rapidly advancing, and new cancer susceptibility genes will most likely be identified in the future. Therefore, I encouraged Joyce to contact me annually or if there are changes in her personal or family history. This may change how we assess her cancer risk, screening, and the testing we would offer.    Plan:  1. A copy of her results was released to her today via the online [employer] portal.   2. She plans to follow-up with FALLON Monae and her physicians.  3. She should contact me regularly, or sooner if her family history changes.    If Joyce has any further questions, I encouraged her to contact me at [phone].    Savannah Mccarty MS, [facility]  Licensed, Certified Genetic Counselor  Office: [phone]  Email: [email or web address]

## 2024-01-11 NOTE — NURSING NOTE
Is the patient currently in the state of [place]? YES    Visit mode:VIDEO    If the visit is dropped, the patient can be reconnected by: VIDEO VISIT: Send to e-mail at: [email or web address]    Will anyone else be joining the visit? NO  (If patient encounters technical issues they should call [phone]    How would you like to obtain your AVS? MyChart    Are changes needed to the allergy or medication list? N/A    Reason for visit: RECHECK    Pancho ARANGO

## 2024-01-11 NOTE — PROGRESS NOTES
"Virtual Visit Details    Type of service:  Video Visit     Originating Location (pt. Location): Home  Distant Location (provider location):  Off-site  Platform used for Video Visit: Stepsss   Time spent over video: 18 minutes    1/11/2024    Referring Provider: Odalis Pearson MD    Presenting Information:  I spoke to Joyce over video today to discuss her genetic testing results. Her blood was drawn on 12/20/23. The CancerNext Panel was ordered from [facility]. This testing was done because of Joyce's family history of cancer.    Genetic Testing Result: NEGATIVE  Joyce is negative for mutations in the 36 genes analyzed: APC, TANVI, BARD1, BMPR1A, BRCA1, BRCA2, BRIP1, CDH1, CDK4, CDKN2A, CHEK2, DICER1, MLH1, MSH2, MSH6, MUTYH, NBN, NF1, NTHL1, PALB2, PMS2, PTEN, RAD51C, RAD51D, RECQL, SMAD4, SMARCA4, STK11 and TP53 (sequencing and deletion/duplication); AXIN2, HOXB13, MSH3, POLD1 and POLE (sequencing only); EPCAM and GREM1 (deletion/duplication only).     A copy of the test report can be found in the Laboratory tab, dated 12/20/23, and named \"LABORATORY MISCELLANEOUS ORDER\". The report is scanned in as a linked document.    Interpretation:  We discussed several different interpretations of this negative test result.    1. One explanation may be that there is a different gene or combination of genes and environment that are associated with the cancers in this family.  2. It is possible that her relatives have a mutation in one of these genes and she did not inherit it.  3. There is also a small possibility that there is a mutation in one of these genes, and the testing laboratory could not find it with their current testing methods.       Screening:  Based on this negative test result, it is important for Joyce and her relatives to refer back to the family history for appropriate cancer screening.      Today Joyce had some updates to the family history information she shared at our initial visit. She reports that her " paternal aunt who had breast cancer (metastatic cancer, thought to be breast), also had pancreatic cancer. She also reported that her paternal aunt who she previously thought may have had breast cancer (the mother of her cousin who currently has breast cancer) did have breast cancer.     Based on the personal and family history information she provided, Joyce has an estimated 20.4% lifetime risk of developing breast cancer based on the LELAND Risk Evaluation v8 model. As such, Joyce meets current National Comprehensive Cancer Network (NCCN) guidelines for high risk breast screening. This includes annual breast MRI in addition to annual mammogram, alternating every six months. In addition, Joyce should be receiving clinical breast exams by her physician. We discussed that Joyce could participate in our Cancer Risk Management Program in which our nursing specialist provides an individual screening plan and assists with medical management. A referral was made to see FALLON Monae for this service.    Other population cancer screening options, such as those recommended by the American Cancer Society and the National Comprehensive Cancer Network (NCCN), are also appropriate for Joyce and her family. These screening recommendations may change if there are changes to Joyce's personal and/or family history of cancer. Final screening recommendations should be made by each individual's primary care provider.       Inheritance:  We reviewed the autosomal dominant inheritance of mutations in these genes. We discussed that Joyce cannot/did not pass on an identifiable mutation in these genes to her children based on this test result. Mutations in these genes do not skip generations.      Additional Testing Considerations:  Although Joyce's genetic testing result was negative, other relatives may still carry a gene mutation associated with an increased risk for cancer. Genetic counseling is recommended for her sister,  maternal relatives, and paternal relatives to discuss genetic testing options. If any of her relatives do pursue genetic testing, Joyce is encouraged to contact me so that we may review the impact of their test results on her.    Summary:  We do not have an explanation for Joyce's family history of cancer. While no genetic changes were identified, Joyce may still be at risk for certain cancers due to family history, environmental factors, or other genetic causes not identified by this test. Because of that, it is important that she continue with cancer screening based on her personal and family history as discussed above.    Genetic testing is rapidly advancing, and new cancer susceptibility genes will most likely be identified in the future. Therefore, I encouraged Joyce to contact me annually or if there are changes in her personal or family history. This may change how we assess her cancer risk, screening, and the testing we would offer.    Plan:  1. A copy of her results was released to her today via the online [employer] portal.   2. She plans to follow-up with FALLON Monae and her physicians.  3. She should contact me regularly, or sooner if her family history changes.    If Joyce has any further questions, I encouraged her to contact me at [phone].    Savannah Mccarty MS, [facility]  Licensed, Certified Genetic Counselor  Office: [phone]  Email: [email or web address]

## 2024-01-15 ENCOUNTER — PATIENT OUTREACH (OUTPATIENT)
Dept: ONCOLOGY | Facility: CLINIC | Age: 49
End: 2024-01-15
Payer: COMMERCIAL

## 2024-01-15 NOTE — PROGRESS NOTES
Writer received referral to Cancer Risk Management/Genetic Counseling.    Referred for:     to see FALLON Monae for high risk breast screening     Referred by: Savannah Mccarty, GC    Referral reviewed for appropriate plan, and sent to New Patient Scheduling ([phone]) for completion.    Sapna Espino, RN, BSN  Oncology New Patient Nurse Navigator   [facility] [place]  [phone]

## 2024-01-19 NOTE — TELEPHONE ENCOUNTER
RECORDS STATUS - ALL OTHER DIAGNOSIS      RECORDS RECEIVED FROM: EPIC   DATE RECEIVED:    NOTES STATUS DETAILS   OFFICE NOTE from genetic counselor Epic 01/11/24: Savannah Mccarty GC   MEDICATION LIST [facility]    LABS     ANYTHING RELATED TO DIAGNOSIS Epic Most recent 09/29/23   GENONOMIC TESTING     TYPE: External: [facility] 12/20/23: [id number]   IMAGING (NEED IMAGES & REPORT)     MAMMOS PACS 07/17/23-05/30/17

## 2024-01-23 NOTE — PROGRESS NOTES
Virtual Visit Details    Type of service:  Video Visit     Originating Location (pt. Location): Home  Distant Location (provider location):  On-site  Platform used for Video Visit: Philipp      Oncology Risk Management Consultation:  Date on this visit: 2024    Joyce Murry  is referred by Savannah Mccarty [facility],  for an oncology risk management consultation. She requires high risk screening and surveillance to reduce her risk of cancer secondary to having a family history of breast cancer in her mother at age 50, paternal aunt, paternal cousin and paternal grandmother. She is considered to be at high risk for breast cancer and has a 20.4% lifetime risk for breast cancer by the LELAND  model.     Primary Physician:  Odalis Pearson MD    History Of Present Illness:  Ms. Murry is a 48 year old female who presents with family history of breast cancer.    Pertinent history:  Menarche at: 13-14  First child at: 33  Menopausal status: Premenopausal  Ovaries, fallopian tubes and uterus intact  Breast Denisty: scattered areas of fibroglandular density   Hx of OCPs: 1 year  Hx of HRT: none  Hx of breast biopsies: one in : fibroadenoma  Hx of atypia or malignancy.  Diet: fairly balanced, low in processed foods  Alcohol:  about 1 drink per week or less.  Smoking:  former tobacco use from age [age]  Other health habits: exercises daily, lifts weights each morning, walks outside when weather permits    Colonoscopy on 22 detected no polyps and follow-up was recommended in 10 years.   Colonoscopy on 17 also detected no polyps.      Genetic testin23. The CancerNext Panel was ordered from [facility]. This testing was done because of Joyce's family history of cancer.     Genetic Testing Result: NEGATIVE  Joyce is negative for mutations in the 36 genes analyzed: APC, TANVI, BARD1, BMPR1A, BRCA1, BRCA2, BRIP1, CDH1, CDK4, CDKN2A, CHEK2, DICER1, MLH1, MSH2, MSH6, MUTYH, NBN, NF1, NTHL1, PALB2, PMS2, PTEN,  RAD51C, RAD51D, RECQL, SMAD4, SMARCA4, STK11 and TP53 (sequencing and deletion/duplication); AXIN2, HOXB13, MSH3, POLD1 and POLE (sequencing only); EPCAM and GREM1 (deletion/duplication only).     Pertinent screening history:  5/15/2014- Bilateral mammogram and left breast US: BiRads4 for 1.3-cm mass in the LEFT breast at 12 o'clock is at a low suspicion for malignancy.  Biopsy is recommended.     5/22/2014: Left breast core needle biopsy:  The pathologic findings of the LEFT breast ultrasound-guided biopsy at 12  o'clock, 4 cm from the nipple showed fibroadenoma.   5/22/2015- Screening mammogram, ACR2  5/25/2016- Screening tomosynthesis mammogram, BiRads0 for FINDINGS: RIGHT Breast: In the slightly upper breast on the MLO view, 7 cm posterior  to the nipple is a 0.9 cm. This is not well seen on the CC view; however,  tomosynthesis images place this in the lateral breast. LEFT Breast: No suspicious findings. Biopsy clips noted.   6/3/2016- Right diagnostic mammogram, BiRads1  5/30/2017- Screening tomosynthesis mammogram, BiRads1  5/31/2018- Screening tomosynthesis mammogram, BiRads1  6/3/2019- Screening tomosynthesis mammogram, BiRads1  6/16/2020- Diagnostic tomosynthesis mammogram, BiRads1  6/17/2021- Screening tomosynthesis mammogram, BiRads1  6/29/2022- Screening tomosynthesis mammogram, BiRads1  7/17/2023- Screening tomosynthesis mammogram, BiRads1    At this visit, she denies new fatigue, breast pain, asymmetry, lumps, masses, thickening, nipple discharge and skin changes in her breasts.      Past Medical History:   Diagnosis Date    Anxiety     Genital herpes     History of hyperlipidemia, improved as of 2016 with diet and exercise     Menstrual migraine      Past Surgical History:   Procedure Laterality Date    COLONOSCOPY  12/19/2017    Dr[facility]    COLONOSCOPY N/A 12/19/2017    Procedure: COLONOSCOPY;  Colonoscopy ;  Surgeon: Leona Joyce MD;  Location:  GI    COLONOSCOPY N/A 7/12/2022     Procedure: COLONOSCOPY crsal;  Surgeon: Leona Joyce MD;  Location:  GI    D & C      HEAD & NECK SURGERY      wisdom teeth removed       Allergies:  Allergies as of 2024 - Reviewed 2024   Allergen Reaction Noted    Amoxicillin-pot clavulanate Rash 10/08/2012       Current Medications:  Current Outpatient Medications   Medication Sig Dispense Refill    KARINA 1.5/30 1.5-30 MG-MCG tablet Take 1 tablet by mouth daily      tretinoin (RETIN-A) 0.1 % external cream APPLY PEA SIZE AMOUNT TO FACE QHS AS TOLERATED  11        Family History:  Family History   Problem Relation Age of Onset    Breast Cancer Mother 50        second occurrence in late 50s, then in 80s with metastatic (cause of death)    Diabetes Type 1 Mother     Leukemia Mother 60 - 69        CLL    Skin Cancer Mother         arm    Hypertension Father         pulmonary HTN    Heart Disease Father 55        CAD-     Diabetes Father 65        type 2    Lung Cancer Maternal Grandfather 76        ; hx of smoking    Breast Cancer Paternal Grandmother          in late 70s    Hodgkin's lymphoma Paternal Grandfather 45         in late 40s, early 50s    Pancreatic Cancer Maternal Uncle 87         in late 80s    Breast Cancer Paternal Aunt          in 70s    Pancreatic Cancer Paternal Aunt     Alzheimer Disease Paternal Aunt 70        possibly also had breast cancer    Breast Cancer Paternal Cousin 52    Colon Cancer No family hx of        Social History:  Social History     Socioeconomic History    Marital status:      Spouse name: Not on file    Number of children: Not on file    Years of education: Not on file    Highest education level: Not on file   Occupational History    Not on file   Tobacco Use    Smoking status: Former     Types: Cigarettes     Quit date: 2002     Years since quittin.9     Passive exposure: Never    Smokeless tobacco: Never   Vaping Use    Vaping Use: Never used   Substance  "and Sexual Activity    Alcohol use: Yes     Comment: 1-2 drink once every 2 weeks    Drug use: Never    Sexual activity: Yes     Partners: Male   Other Topics Concern    Parent/sibling w/ CABG, MI or angioplasty before 65F 55M? 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 , designs [employer]    Tobacco-none, quit 2002, smoked 1 ppd x 10 yrs    ETOH- rare    Exercise-  3/week-gym; body pump and kickboxing         Social Determinants of Health     Financial Resource Strain: Not on file   Food Insecurity: Not on file   Transportation Needs: Not on file   Physical Activity: Not on file   Stress: Not on file   Social Connections: Not on file   Interpersonal Safety: Not on file   Housing Stability: Not on file         Physical Exam:  Ht 1.689 m (5' 6.5\")   Wt 61.2 kg (135 lb)   BMI 21.46 kg/m    GENERAL: alert and no distress  EYES: Eyes grossly normal to inspection.  No discharge or erythema, or obvious scleral/conjunctival abnormalities.  RESP: No audible wheeze, cough, or visible cyanosis.    SKIN: Visible skin clear. No significant rash, abnormal pigmentation or lesions.  NEURO: Cranial nerves grossly intact.  Mentation and speech appropriate for age.  PSYCH: Appropriate affect, tone, and pace of words      Laboratory/Imaging Studies  No results found for any visits on 01/25/24.    ASSESSMENT  We discussed that a breast MRI would be recommended for her, with the understanding that insurance may or may not cover it entirely. The breast MRI in high-risk women has a higher sensitivity than mammography, and the combination of mammography and MRI in this population has the highest sensitivity. In a high-risk population, MRI and mammography combined have a higher sensitivity (92.7%) than US and mammography combined (52%) (Source: Minoo CRUZ, Bruno MAR, Senthil PEREZ, et al. Detection of breast cancer with addition of annual screening ultrasound or a single screening MRI " to mammography in women with elevated breast cancer risk. JOSE MANUEL.2012;307(13):6181-1913. ) Therefore, in high-risk women for whom supplemental screening is indicated, MRI is recommended when possible. Screening high-risk women with breast MRI is cost-effective, and the cost-effectiveness of screening MRI increases with increasing breast cancer risk ( Nicole et. al 2006 and Joel et al. 2009). The National Comprehensive Cancer Society, American Cancer Society and American College of Radiologists recommend breast-screening MRI in high-risk women.    In her case, I have ordered a breast MRI (IMG 1045) using the diagnostic codes:  Breast Screening, high risk patient (Z12.39)  Family history of breast cancer (Z80.3)    She was advised to check with her insurance company and request a cost of care estimate using [employer]'s My Chart function.     We reviewed Joyce's family history at this visit. There are no updates to her family history since she saw the genetic counselor. She states that she generally feels well. She exercises daily and eats a balanced diet. She has no concerns about her breast tissue at this time. We dsicussed her family history of pacnreatic cancer as well. She has one maternal uncle and one paternal aunt who had pancreatic cancer. At this time, pacnreatic screening is not recommended unless an individual has a first degree relative with pancreatic cancer. We can discuss this at future visits.     We discussed that Joyce is worried about the Breast MRI procedure. I have prescribed her alprazolam for this reason to be taken prior to the breast MRI.     We also discussed following  a healthy lifestyle plan recommended by both NCCN :    Healthy lifestyle for breast cancer risk reduction  Consider breast cancer risks associated with combined estrogen/ progesterone agents ?3-5 year's duration of use.    Any alcohol intake increases the risk for breast cancer and is best avoided. Patients who choose to  drink alcohol should limit their consumption to no more than one drink equivalent in a day and no more than three per week.    Exercise - Be active daily; avoid being sedentary. Take part in 150-300 minutes of moderate-intensity physical activity per week; exceeding the upper limit is optimal.    Weight control -Evidence suggests that maintaining a healthy body weight (20-25 BMI) helps reduce breast cancer risk.      Individualized Surveillance Plan for women  With 20% or greater lifetime risk of breast cancer   Per NCCN Breast Cancer Screening and Diagnosis Guidelines Version 1.2023   Recommended screening Test or procedure Last done Next Scheduled    Clinical encounter Clinical exam every 6-12 months.   Refer to genetic counseling if not already done.  Consider risk reduction strategies.   1/23/2024 July 2024   However, some family histories with breast cancers at a very young age, may warrant screening starting earlier.    *May begin at age 40 if breast cancers in the family occur at later ages.    Annual mammogram beginning 10 years younger than the earliest breast cancer in the family but not prior to age 30.    Recommend annual breast MRI to begin 10 years younger than the earliest breast cancer in the family but not prior to age 25.    Breast MRIs are preferably done on day 7-15 of the menstrual cycle in premenopausal women. 7/17/2023- Screening tomosynthesis mammogram, BiRads1 Breast MRI soon    Next exam: July 2024 followed by mammogram (7/18 or later)    Return to clinic in July with Lita Villafuerte mammogram following our visit   Breast screening for patients at high risk due to thoracic radiation between the ages of 10-30   Annual clinical exam beginning 8 years after radiation therapy.    Annual screening mammogram beginning at age 30 or 8 years after radiation therapy    Annual breast MRI, beginning at age 25 or 8 years after radiation therapy.       NA     NA   Women who have a lifetime risk of  >20% based on history of LCIS or ADH/ALH Annual screening mammogram beginning at age of LCIS or ADH/ALH but not prior to age 30.    Consider annual MRI to begin at age of diagnosis of LCIS or ADH/ALH but not prior to age 25.    Strongly recommend risk reducing strategies if possible     NA   NA    Recommend risk reducing strategies for women with 1.7% 5 year risk of breast cancer.           I spent a total of 45 minutes on the day of the visit. Please see the note for further information on patient assessment and treatment.     Lita Villafuerte, DNP, APRN, AGCNS-BC  Clinical Nurse Specialist  Cancer Risk Management Program  [facility]    The patient was seen in conjunction with me today.  I agree with the exam assessment and  am in agreement with the plan.    Vane Pringle, KAREN-CNS, OCN, AGN-BC  Clinical Nurse Specialist  Cancer Risk Management Program  [facility]      Cc:  Odalis Pearson MD

## 2024-01-25 ENCOUNTER — PRE VISIT (OUTPATIENT)
Dept: ONCOLOGY | Facility: CLINIC | Age: 49
End: 2024-01-25
Payer: COMMERCIAL

## 2024-01-25 ENCOUNTER — VIRTUAL VISIT (OUTPATIENT)
Dept: ONCOLOGY | Facility: CLINIC | Age: 49
End: 2024-01-25
Attending: GENETIC COUNSELOR, MS
Payer: COMMERCIAL

## 2024-01-25 VITALS — WEIGHT: 135 LBS | BODY MASS INDEX: 21.19 KG/M2 | HEIGHT: 67 IN

## 2024-01-25 DIAGNOSIS — F41.9 ANXIETY: ICD-10-CM

## 2024-01-25 DIAGNOSIS — Z12.39 BREAST CANCER SCREENING, HIGH RISK PATIENT: Primary | ICD-10-CM

## 2024-01-25 DIAGNOSIS — Z80.3 FAMILY HISTORY OF MALIGNANT NEOPLASM OF BREAST: ICD-10-CM

## 2024-01-25 PROCEDURE — 99215 OFFICE O/P EST HI 40 MIN: CPT | Mod: 95 | Performed by: CLINICAL NURSE SPECIALIST

## 2024-01-25 RX ORDER — ALPRAZOLAM 0.25 MG
0.25 TABLET ORAL ONCE
Qty: 2 TABLET | Refills: 0 | Status: SHIPPED | OUTPATIENT
Start: 2024-01-25 | End: 2024-01-25

## 2024-01-25 ASSESSMENT — PAIN SCALES - GENERAL: PAINLEVEL: NO PAIN (0)

## 2024-01-25 NOTE — LETTER
2024         RE: Joyce Murry  [address]  [facility] [address]        Dear Colleague,    Thank you for referring your patient, Joyce Murry, to the [facility]. Please see a copy of my visit note below.      Oncology Risk Management Consultation:  Date on this visit: 2024    Joyce Murry  is referred by [facility],  for an oncology risk management consultation. She requires high risk screening and surveillance to reduce her risk of cancer secondary to having a family history of breast cancer in her mother at age 50, paternal aunt, paternal cousin and paternal grandmother. She is considered to be at high risk for breast cancer and has a 20.4% lifetime risk for breast cancer by the LELAND  model.     Primary Physician:  Odalis Pearson MD    History Of Present Illness:  Ms. Murry is a 48 year old female who presents with family history of breast cancer.    Pertinent history:  Menarche at: 13-14  First child at: 33  Menopausal status: Premenopausal  Ovaries, fallopian tubes and uterus intact  Breast Denisty: scattered areas of fibroglandular density   Hx of OCPs: 1 year  Hx of HRT: none  Hx of breast biopsies: one in : fibroadenoma  Hx of atypia or malignancy.  Diet: fairly balanced, low in processed foods  Alcohol:  about 1 drink per week or less.  Smoking:  former tobacco use from age [age]  Other health habits: exercises daily, lifts weights each morning, walks outside when weather permits    Colonoscopy on 22 detected no polyps and follow-up was recommended in 10 years.   Colonoscopy on 17 also detected no polyps.      Genetic testin23. The CancerNext Panel was ordered from [facility]. This testing was done because of Joyce's family history of cancer.     Genetic Testing Result: NEGATIVE  Joyce is negative for mutations in the 36 genes analyzed: APC, TANVI, BARD1, BMPR1A, BRCA1, BRCA2, BRIP1, CDH1, CDK4, CDKN2A, CHEK2, DICER1,  MLH1, MSH2, MSH6, MUTYH, NBN, NF1, NTHL1, PALB2, PMS2, PTEN, RAD51C, RAD51D, RECQL, SMAD4, SMARCA4, STK11 and TP53 (sequencing and deletion/duplication); AXIN2, HOXB13, MSH3, POLD1 and POLE (sequencing only); EPCAM and GREM1 (deletion/duplication only).     Pertinent screening history:  5/15/2014- Bilateral mammogram and left breast US: BiRads4 for 1.3-cm mass in the LEFT breast at 12 o'clock is at a low suspicion for malignancy.  Biopsy is recommended.     5/22/2014: Left breast core needle biopsy:  The pathologic findings of the LEFT breast ultrasound-guided biopsy at 12  o'clock, 4 cm from the nipple showed fibroadenoma.   5/22/2015- Screening mammogram, ACR2  5/25/2016- Screening tomosynthesis mammogram, BiRads0 for FINDINGS: RIGHT Breast: In the slightly upper breast on the MLO view, 7 cm posterior  to the nipple is a 0.9 cm. This is not well seen on the CC view; however,  tomosynthesis images place this in the lateral breast. LEFT Breast: No suspicious findings. Biopsy clips noted.   6/3/2016- Right diagnostic mammogram, BiRads1  5/30/2017- Screening tomosynthesis mammogram, BiRads1  5/31/2018- Screening tomosynthesis mammogram, BiRads1  6/3/2019- Screening tomosynthesis mammogram, BiRads1  6/16/2020- Diagnostic tomosynthesis mammogram, BiRads1  6/17/2021- Screening tomosynthesis mammogram, BiRads1  6/29/2022- Screening tomosynthesis mammogram, BiRads1  7/17/2023- Screening tomosynthesis mammogram, BiRads1    At this visit, she denies new fatigue, breast pain, asymmetry, lumps, masses, thickening, nipple discharge and skin changes in her breasts.      Past Medical History:   Diagnosis Date    Anxiety     Genital herpes     History of hyperlipidemia, improved as of 2016 with diet and exercise     Menstrual migraine      Past Surgical History:   Procedure Laterality Date    COLONOSCOPY  12/19/2017    Dr. Joyce [facility]    COLONOSCOPY N/A 12/19/2017    Procedure: COLONOSCOPY;  Colonoscopy ;  Surgeon: Leona Joyce  MD Yimi;  Location:  GI    COLONOSCOPY N/A 2022    Procedure: COLONOSCOPY crsal;  Surgeon: Leona Joyce MD;  Location:  GI    D & C      HEAD & NECK SURGERY      wisdom teeth removed       Allergies:  Allergies as of 2024 - Reviewed 2024   Allergen Reaction Noted    Amoxicillin-pot clavulanate Rash 10/08/2012       Current Medications:  Current Outpatient Medications   Medication Sig Dispense Refill    KARINA 1.530 1.5-30 MG-MCG tablet Take 1 tablet by mouth daily      tretinoin (RETIN-A) 0.1 % external cream APPLY PEA SIZE AMOUNT TO FACE QHS AS TOLERATED  11        Family History:  Family History   Problem Relation Age of Onset    Breast Cancer Mother 50        second occurrence in late 50s, then in 80s with metastatic (cause of death)    Diabetes Type 1 Mother     Leukemia Mother 60 - 69        CLL    Skin Cancer Mother         arm    Hypertension Father         pulmonary HTN    Heart Disease Father 55        CAD-     Diabetes Father 65        type 2    Lung Cancer Maternal Grandfather 76        ; hx of smoking    Breast Cancer Paternal Grandmother          in late 70s    Hodgkin's lymphoma Paternal Grandfather 45         in late 40s, early 50s    Pancreatic Cancer Maternal Uncle 87         in late 80s    Breast Cancer Paternal Aunt          in 70s    Pancreatic Cancer Paternal Aunt     Alzheimer Disease Paternal Aunt 70        possibly also had breast cancer    Breast Cancer Paternal Cousin 52    Colon Cancer No family hx of        Social History:  Social History     Socioeconomic History    Marital status:      Spouse name: Not on file    Number of children: Not on file    Years of education: Not on file    Highest education level: Not on file   Occupational History    Not on file   Tobacco Use    Smoking status: Former     Types: Cigarettes     Quit date: 2002     Years since quittin.9     Passive exposure: Never    Smokeless  "tobacco: Never   Vaping Use    Vaping Use: Never used   Substance and Sexual Activity    Alcohol use: Yes     Comment: 1-2 drink once every 2 weeks    Drug use: Never    Sexual activity: Yes     Partners: Male   Other Topics Concern    Parent/sibling w/ CABG, MI or angioplasty before 65F 55M? 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  No discharge or erythema, or obvious scleral/conjunctival abnormalities.  RESP: No audible wheeze, cough, or visible cyanosis.    SKIN: Visible skin clear. No significant rash, abnormal pigmentation or lesions.  NEURO: Cranial nerves grossly intact.  Mentation and speech appropriate for age.  PSYCH: Appropriate affect, tone, and pace of words      Laboratory/Imaging Studies  No results found for any visits on 01/25/24.    ASSESSMENT  We discussed that a breast MRI would be recommended for her, with the understanding that insurance may or may not cover it entirely. The breast MRI in high-risk women has a higher sensitivity than mammography, and the combination of mammography and MRI in this population has the highest sensitivity. In a high-risk population, MRI and mammography combined have a higher sensitivity (92.7%) than US and mammography combined (52%) (Source: Minoo CRUZ, Bruno MAR, Senthil PEREZ, et al. Detection of breast cancer with " addition of annual screening ultrasound or a single screening MRI to mammography in women with elevated breast cancer risk. JOSE MANUEL.2012;307(13):7751-2800. ) Therefore, in high-risk women for whom supplemental screening is indicated, MRI is recommended when possible. Screening high-risk women with breast MRI is cost-effective, and the cost-effectiveness of screening MRI increases with increasing breast cancer risk ( Nicole et. al 2006 and Joel et al. 2009). The National Comprehensive Cancer Society, American Cancer Society and American College of Radiologists recommend breast-screening MRI in high-risk women.    In her case, I have ordered a breast MRI (IMG 1045) using the diagnostic codes:  Breast Screening, high risk patient (Z12.39)  Family history of breast cancer (Z80.3)    She was advised to check with her insurance company and request a cost of care estimate using [employer]'s My Chart function.     We reviewed Joyce's family history at this visit. There are no updates to her family history since she saw the genetic counselor. She states that she generally feels well. She exercises daily and eats a balanced diet. She has no concerns about her breast tissue at this time. We dsicussed her family history of pacnreatic cancer as well. She has one maternal uncle and one paternal aunt who had pancreatic cancer. At this time, pacnreatic screening is not recommended unless an individual has a first degree relative with pancreatic cancer. We can discuss this at future visits.     We discussed that Joyce is worried about the Breast MRI procedure. I have prescribed her alprazolam for this reason to be taken prior to the breast MRI.     We also discussed following  a healthy lifestyle plan recommended by both NCCN :    Healthy lifestyle for breast cancer risk reduction  Consider breast cancer risks associated with combined estrogen/ progesterone agents ?3-5 year's duration of use.    Any alcohol intake increases the risk  for breast cancer and is best avoided. Patients who choose to drink alcohol should limit their consumption to no more than one drink equivalent in a day and no more than three per week.    Exercise - Be active daily; avoid being sedentary. Take part in 150-300 minutes of moderate-intensity physical activity per week; exceeding the upper limit is optimal.    Weight control -Evidence suggests that maintaining a healthy body weight (20-25 BMI) helps reduce breast cancer risk.      Individualized Surveillance Plan for women  With 20% or greater lifetime risk of breast cancer   Per NCCN Breast Cancer Screening and Diagnosis Guidelines Version 1.2023   Recommended screening Test or procedure Last done Next Scheduled    Clinical encounter Clinical exam every 6-12 months.   Refer to genetic counseling if not already done.  Consider risk reduction strategies.   1/23/2024 July 2024   However, some family histories with breast cancers at a very young age, may warrant screening starting earlier.    *May begin at age 40 if breast cancers in the family occur at later ages.    Annual mammogram beginning 10 years younger than the earliest breast cancer in the family but not prior to age 30.    Recommend annual breast MRI to begin 10 years younger than the earliest breast cancer in the family but not prior to age 25.    Breast MRIs are preferably done on day 7-15 of the menstrual cycle in premenopausal women. 7/17/2023- Screening tomosynthesis mammogram, BiRads1 Breast MRI soon    Next exam: July 2024 followed by mammogram (7/18 or later)    Return to clinic in July with Lita Villafuerte mammogram following our visit   Breast screening for patients at high risk due to thoracic radiation between the ages of 10-30   Annual clinical exam beginning 8 years after radiation therapy.    Annual screening mammogram beginning at age 30 or 8 years after radiation therapy    Annual breast MRI, beginning at age 25 or 8 years after radiation  therapy.       NA     NA   Women who have a lifetime risk of >20% based on history of LCIS or ADH/ALH Annual screening mammogram beginning at age of LCIS or ADH/ALH but not prior to age 30.    Consider annual MRI to begin at age of diagnosis of LCIS or ADH/ALH but not prior to age 25.    Strongly recommend risk reducing strategies if possible     NA   NA    Recommend risk reducing strategies for women with 1.7% 5 year risk of breast cancer.           I spent a total of 45 minutes on the day of the visit. Please see the note for further information on patient assessment and treatment.     Lita Villafuerte, CHRIS, APRN, AGCNS-BC  Clinical Nurse Specialist  Cancer Risk Management Program  [facility]    The patient was seen in conjunction with me today.  I agree with the exam assessment and  am in agreement with the plan.    Vane Pringle, APRN-CNS, OCN, AGN-BC  Clinical Nurse Specialist  Cancer Risk Management Program  [facility]      Cc:  Odalis Pearson MD

## 2024-01-25 NOTE — NURSING NOTE
Patient is also taking- Vitamin D, Multivitamin, Magnesium at bedtime.       Is the patient currently in the state of [place]? YES    Visit mode:VIDEO    If the visit is dropped, the patient can be reconnected by: VIDEO VISIT: Send to e-mail at: [email or web address]    Will anyone else be joining the visit? NO  (If patient encounters technical issues they should call [phone]    How would you like to obtain your AVS? MyChart    Are changes needed to the allergy or medication list? Yes ADD MEDICATIONS- Vitamin D, Multivitamin, Magnesium at bedtime.     Reason for visit: Consult    Kamilla ARANGO

## 2024-01-25 NOTE — PATIENT INSTRUCTIONS
Individualized Surveillance Plan for women  With 20% or greater lifetime risk of breast cancer   Per NCCN Breast Cancer Screening and Diagnosis Guidelines Version 1.2023   Recommended screening Test or procedure Last done Next Scheduled    Clinical encounter Clinical exam every 6-12 months.   Refer to genetic counseling if not already done.  Consider risk reduction strategies.    1/23/2024 July 2024   However, some family histories with breast cancers at a very young age, may warrant screening starting earlier.     *May begin at age 40 if breast cancers in the family occur at later ages.     Annual mammogram beginning 10 years younger than the earliest breast cancer in the family but not prior to age 30.     Recommend annual breast MRI to begin 10 years younger than the earliest breast cancer in the family but not prior to age 25.     Breast MRIs are preferably done on day 7-15 of the menstrual cycle in premenopausal women. 7/17/2023- Screening tomosynthesis mammogram, BiRads1 Breast MRI soon     Next exam: July 2024 followed by mammogram (7/18 or later)     Return to clinic in July with Lita Villafuerte mammogram following our visit   Breast screening for patients at high risk due to thoracic radiation between the ages of 10-30    Annual clinical exam beginning 8 years after radiation therapy.     Annual screening mammogram beginning at age 30 or 8 years after radiation therapy     Annual breast MRI, beginning at age 25 or 8 years after radiation therapy.          NA       NA   Women who have a lifetime risk of >20% based on history of LCIS or ADH/ALH Annual screening mammogram beginning at age of LCIS or ADH/ALH but not prior to age 30.     Consider annual MRI to begin at age of diagnosis of LCIS or ADH/ALH but not prior to age 25.     Strongly recommend risk reducing strategies if possible       NA    NA     Recommend risk reducing strategies for women with 1.7% 5 year risk of breast cancer.

## 2024-03-05 ASSESSMENT — PATIENT HEALTH QUESTIONNAIRE - PHQ9
SUM OF ALL RESPONSES TO PHQ QUESTIONS 1-9: 8
SUM OF ALL RESPONSES TO PHQ QUESTIONS 1-9: 8
10. IF YOU CHECKED OFF ANY PROBLEMS, HOW DIFFICULT HAVE THESE PROBLEMS MADE IT FOR YOU TO DO YOUR WORK, TAKE CARE OF THINGS AT HOME, OR GET ALONG WITH OTHER PEOPLE: SOMEWHAT DIFFICULT

## 2024-03-05 ASSESSMENT — ANXIETY QUESTIONNAIRES
GAD7 TOTAL SCORE: 12
5. BEING SO RESTLESS THAT IT IS HARD TO SIT STILL: NOT AT ALL
1. FEELING NERVOUS, ANXIOUS, OR ON EDGE: MORE THAN HALF THE DAYS
IF YOU CHECKED OFF ANY PROBLEMS ON THIS QUESTIONNAIRE, HOW DIFFICULT HAVE THESE PROBLEMS MADE IT FOR YOU TO DO YOUR WORK, TAKE CARE OF THINGS AT HOME, OR GET ALONG WITH OTHER PEOPLE: SOMEWHAT DIFFICULT
8. IF YOU CHECKED OFF ANY PROBLEMS, HOW DIFFICULT HAVE THESE MADE IT FOR YOU TO DO YOUR WORK, TAKE CARE OF THINGS AT HOME, OR GET ALONG WITH OTHER PEOPLE?: SOMEWHAT DIFFICULT
2. NOT BEING ABLE TO STOP OR CONTROL WORRYING: MORE THAN HALF THE DAYS
7. FEELING AFRAID AS IF SOMETHING AWFUL MIGHT HAPPEN: NEARLY EVERY DAY
GAD7 TOTAL SCORE: 12
4. TROUBLE RELAXING: SEVERAL DAYS
GAD7 TOTAL SCORE: 12
6. BECOMING EASILY ANNOYED OR IRRITABLE: MORE THAN HALF THE DAYS
3. WORRYING TOO MUCH ABOUT DIFFERENT THINGS: MORE THAN HALF THE DAYS
7. FEELING AFRAID AS IF SOMETHING AWFUL MIGHT HAPPEN: NEARLY EVERY DAY

## 2024-03-06 ENCOUNTER — VIRTUAL VISIT (OUTPATIENT)
Dept: PSYCHOLOGY | Facility: CLINIC | Age: 49
End: 2024-03-06
Payer: COMMERCIAL

## 2024-03-06 ENCOUNTER — DOCUMENTATION ONLY (OUTPATIENT)
Dept: PSYCHOLOGY | Facility: CLINIC | Age: 49
End: 2024-03-06
Payer: COMMERCIAL

## 2024-03-06 DIAGNOSIS — F33.0 MAJOR DEPRESSIVE DISORDER, RECURRENT EPISODE, MILD (H): ICD-10-CM

## 2024-03-06 DIAGNOSIS — F41.1 GENERALIZED ANXIETY DISORDER: Primary | ICD-10-CM

## 2024-03-06 PROCEDURE — 90791 PSYCH DIAGNOSTIC EVALUATION: CPT | Mod: 95 | Performed by: PSYCHOLOGIST

## 2024-03-06 ASSESSMENT — COLUMBIA-SUICIDE SEVERITY RATING SCALE - C-SSRS
1. HAVE YOU WISHED YOU WERE DEAD OR WISHED YOU COULD GO TO SLEEP AND NOT WAKE UP?: NO
ATTEMPT LIFETIME: NO
TOTAL  NUMBER OF ABORTED OR SELF INTERRUPTED ATTEMPTS LIFETIME: NO
2. HAVE YOU ACTUALLY HAD ANY THOUGHTS OF KILLING YOURSELF?: NO
6. HAVE YOU EVER DONE ANYTHING, STARTED TO DO ANYTHING, OR PREPARED TO DO ANYTHING TO END YOUR LIFE?: NO
TOTAL  NUMBER OF INTERRUPTED ATTEMPTS LIFETIME: NO

## 2024-03-06 NOTE — PROGRESS NOTES
[facility]      PATIENT'S NAME: Joyce Murry  PREFERRED NAME: Joyce  PRONOUNS:    She/Her  MRN: [id number]  : 1975  ADDRESS: [address] [place] [id number]  ACCT. NUMBER:  [id number]  DATE OF SERVICE: 3/06/24  START TIME: 8:40  END TIME: 9:14  PREFERRED PHONE: [phone]  May we leave a program related message: Yes  EMERGENCY CONTACT: was not obtained  .  SERVICE MODALITY:  Video Visit:      Provider verified identity through the following two step process.  Patient provided:  Patient     Telemedicine Visit: The patient's condition can be safely assessed and treated via synchronous audio and visual telemedicine encounter.      Reason for Telemedicine Visit: Services only offered telehealth    Originating Site (Patient Location): Patient's home    Distant Site (Provider Location): Provider Remote Setting- Home Office    Consent:  The patient/guardian has verbally consented to: the potential risks and benefits of telemedicine (video visit) versus in person care; bill my insurance or make self-payment for services provided; and responsibility for payment of non-covered services.     Patient would like the video invitation sent by:  My Chart    Mode of Communication:  Video Conference via Better Weekdays    Distant Location (Provider):  Off-site    As the provider I attest to compliance with applicable laws and regulations related to telemedicine.    UNIVERSAL ADULT Mental Health DIAGNOSTIC ASSESSMENT    Identifying Information:  Patient is a 48 year old,   individual.  Patient was referred for an assessment by primary care provider.  Patient attended the session alone.    Chief Complaint:   The purpose of this evaluation is to: provide treatment recommendations and clarify diagnosis. Patient reported seeking services at this time for diagnostic assessment and recommendations for treatment.  Patient reported that she has not completed a previous ADHD diagnostic assessment.   "Patient has not received a previous diagnosis of ADHD. Patient reported that medication has not been prescribed medication to address these problems. She has been reading a book called \"Women with ADHD\" (her sister gifted her this for Belle). Client has difficulties with time management. She can't get organized. She feels overwhelmed constantly. She feels irritable and \"controlling.\" She can't pay attention to details and makes mistakes. She can't sustain attention. She gets side tracked and can't finish tasks. She loses things often. Client feels inadequate. Her life feels chaotic and out of control and routine tasks feel impossible to tackle. She avoids projects that require sustained mental effort. She feels she is unable to reach goals. She thinks that she has potential and ideas and creativity but \"I can't get from A to B because I get trapped and don't know where to start or what to do. I feel paralyzed and overwhelmed and I don't know what to do.\" As she has gotten older, she has realized that she has always been this way but she has learned how to cope with these things. She has been resistant to medication, but she is becoming more open to medication as a potential treatment option at this point in her life. Client explained that, twenty years ago, Client's older sister is a physician and attended a conference about ADHD with their mother and her mother commented \"Oh wow, that sounds like Joyce.\"     Social/Family History:  Patient reported they grew up in  [facility].  They were raised by biological parents  .  Parents were always together.  She was the third born of three children. Patient reported that their childhood was \"great.\" She had a happy childhood and felt stable and loved. There was some academic pressure and not a lot of discussion about feelings and emotions. Her father was \"super anxious\" and she didn't realize this until she was an adult.  Patient described their current " "relationships with family of origin as good with sisters. She has been closer with her older sister. They are all in touch and get along well. Her parents are both . Her father had health issues and  \"of old age\" in his home. Her mother  in  at home and they were all together. Her rey had deteriorated.    The patient describes their cultural background as other.  Cultural influences and impact on patient's life structure, values, norms, and healthcare: [place].  Grew up in [place] and raised by immigrant parents.  I'm ..  Contextual influences on patient's health include: Contextual Factors: Health- Seeking Factors has been averse to medications in the past, but is becoming more open now . These factors will be addressed in the Preliminary Treatment plan. Patient identified their preferred language to be English. Patient reported they does not need the assistance of an  or other support involved in therapy.     Patient reported had no significant delays in developmental tasks. She was always told that she was smart and had a high IQ. Patient's highest education level was college graduate  .  Patient identified the following learning problems: attention and concentration. Client had a hard time getting work done. She disliked reading and \"did the bare minimum of homework.\" She struggled with focusing and getting assignments done. In college she did things at the last minute. She couldn't \"get it together\" to do the work and she thinks she could have been a better student. She thinks her parents were disappointed by her grades (she was told she had potential and was smart and she just needed to work harder). Both of her older sisters were high achievers and she didn't feel she could do as well as them. Modifications will not be used to assist communication in therapy.  Patient reports they are  able to understand written materials.    Patient reported the " "following relationship history: one marriage. Patient's current relationship status is  for 20 years. They get along well. They are in couples therapy together.   Patient identified their sexual orientation as heterosexual.  Patient reported having two child(jim); daughters ages 12 and 15. Patient identified partner; siblings; friends as part of their support system.  Patient identified the quality of these relationships as inconsistent,  .      Patient's current living/housing situation involves staying in own home/apartment.  The immediate members of family and household include Cesar, Nito, and two kids  and they report that housing is stable.    Patient is currently employed part time.  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 She has been with this company for 2.5 years. Patient reports their finances are obtained through employment and spouse. Patient does not identify finances as a current stressor.      Patient reported that they have not been involved with the legal system.  Patient does not report being under probation/ parole/ jurisdiction.     Patient's Strengths and Limitations:  Patient identified the following strengths or resources that will help them succeed in treatment: commitment to health and well being, friends / good social support, family support, insight, intelligence, motivation, sense of humor, strong social skills, and work ethic. Things that may interfere with the patient's success in treatment include: none identified.     Assessments:  The following assessments were completed by patient for this visit:  PHQ9:       3/1/2018    12:29 PM 3/8/2018     1:26 PM 4/12/2018    12:42 PM 5/10/2018     2:14 PM 5/24/2018    11:06 AM 10/30/2019    10:07 AM 3/5/2024     7:58 PM   PHQ-9 SCORE   PHQ-9 Total Score MyChart       8 (Mild depression) "   PHQ-9 Total Score 5 3 1 8 5 12 8     GAD7:       12/14/2017    11:21 AM 2/8/2018    11:31 AM 3/1/2018    12:29 PM 5/10/2018     2:14 PM 5/24/2018    11:06 AM 10/30/2019    10:07 AM 3/5/2024     8:11 PM   GABRIEL-7 SCORE   Total Score       12 (moderate anxiety)   Total Score 9 8 9 10 9 13 12     PROMIS 10-Global Health (all questions and answers displayed):       3/5/2024     8:12 PM   PROMIS 10   In general, would you say your health is: Very good   In general, would you say your quality of life is: Very good   In general, how would you rate your physical health? Very good   In general, how would you rate your mental health, including your mood and your ability to think? Fair   In general, how would you rate your satisfaction with your social activities and relationships? Good   In general, please rate how well you carry out your usual social activities and roles Good   To what extent are you able to carry out your everyday physical activities such as walking, climbing stairs, carrying groceries, or moving a chair? Completely   In the past 7 days, how often have you been bothered by emotional problems such as feeling anxious, depressed, or irritable? Often   In the past 7 days, how would you rate your fatigue on average? Moderate   In the past 7 days, how would you rate your pain on average, where 0 means no pain, and 10 means worst imaginable pain? 2   In general, would you say your health is: 4   In general, would you say your quality of life is: 4   In general, how would you rate your physical health? 4   In general, how would you rate your mental health, including your mood and your ability to think? 2   In general, how would you rate your satisfaction with your social activities and relationships? 3   In general, please rate how well you carry out your usual social activities and roles. (This includes activities at home, at work and in your community, and responsibilities as a parent, child, spouse, employee,  friend, etc.) 3   To what extent are you able to carry out your everyday physical activities such as walking, climbing stairs, carrying groceries, or moving a chair? 5   In the past 7 days, how often have you been bothered by emotional problems such as feeling anxious, depressed, or irritable? 4   In the past 7 days, how would you rate your fatigue on average? 3   In the past 7 days, how would you rate your pain on average, where 0 means no pain, and 10 means worst imaginable pain? 2   Global Mental Health Score 11   Global Physical Health Score 16   PROMIS TOTAL - SUBSCORES 27     [place] Suicide Severity Rating Scale (Lifetime/Recent)      3/6/2024     8:48 AM   [place] Suicide Severity Rating (Lifetime/Recent)   Q1 Wish to be Dead (Lifetime) N   Q2 Non-Specific Active Suicidal Thoughts (Lifetime) N   Actual Attempt (Lifetime) N   Has subject engaged in non-suicidal self-injurious behavior? (Lifetime) N   Interrupted Attempts (Lifetime) N   Aborted or Self-Interrupted Attempt (Lifetime) N   Preparatory Acts or Behavior (Lifetime) N   Calculated C-SSRS Risk Score (Lifetime/Recent) No Risk Indicated       Answers submitted by the patient for this visit:  Patient Health Questionnaire (Submitted on 3/5/2024)  If you checked off any problems, how difficult have these problems made it for you to do your work, take care of things at home, or get along with other people?: Somewhat difficult  PHQ9 TOTAL SCORE: 8  GABRIEL-7 (Submitted on 3/5/2024)  GABRIEL 7 TOTAL SCORE: 12      Personal and Family Medical History:  Patient does report a family history of mental health concerns.  Patient reports family history includes Alzheimer Disease (age of onset: 70) in her paternal aunt; Anxiety Disorder in her sister; Attention Deficit Disorder in her daughter and sister; Breast Cancer in her paternal aunt and paternal grandmother; Breast Cancer (age of onset: 50) in her mother; Breast Cancer (age of onset: 52) in her paternal cousin;  "Diabetes (age of onset: 65) in her father; Diabetes Type 1 in her mother; Heart Disease (age of onset: 55) in her father; Hodgkin's lymphoma (age of onset: 45) in her paternal grandfather; Hypertension in her father; Leukemia (age of onset: 60 - 69) in her mother; Lung Cancer (age of onset: 76) in her maternal grandfather; Pancreatic Cancer in her paternal aunt; Pancreatic Cancer (age of onset: 87) in her maternal uncle; Skin Cancer in her mother..     Patient does report Mental Health Diagnosis and/or Treatment.  Patient reported the following previous diagnoses which include(s): an Anxiety Disorder. Client started seeing a therapist when her younger daughter was 3 and this therapist mentioned \"OCD\" but she is not sure if she had an official diagnosis (\"constant negative thoughts and worrying that something bad was going to happen\").  Patient reported symptoms began in adulthood. Client doesn't remember feeling anxious as a child at all. She thinks she has become much more aware of her anxiety as she has gotten older and had children. This was an issue she started addressing in her late 20s or early 30s.  Patient has received mental health services in the past:  counseling .  Client first did therapy in the early 2000s (in her 20s or 30s). She has been resistant to medications. Client was last in individual therapy before 2020. She and her  are currently in couples counseling.  Psychiatric Hospitalizations: None.  Patient denies a history of civil commitment.  Patient is receiving other mental health services.  These include  couples therapy with  .       Patient has had a physical exam to rule out medical causes for current symptoms.  Date of last physical exam was within the past year. Client was encouraged to follow up with PCP if symptoms were to develop. The patient has a [facility] Primary Care Provider, who is named Odalis Pearson..  Patient reports no current medical concerns.  Patient denies any " issues with pain..   There are not significant appetite / nutritional concerns / weight changes.   Patient does not report a history of head injury / trauma / cognitive impairment.      Patient reports current meds as:   Outpatient Medications Marked as Taking for the 3/6/24 encounter (Virtual Visit) with Maria De Jesus Vieyra, PhD   Medication Sig    KARINA 1.5/30 1.5-30 MG-MCG tablet Take 1 tablet by mouth daily    tretinoin (RETIN-A) 0.1 % external cream APPLY PEA SIZE AMOUNT TO FACE QHS AS TOLERATED       Medication Adherence:  Patient reports taking.  taking prescribed medications as prescribed.    Patient Allergies:    Allergies   Allergen Reactions    Amoxicillin-Pot Clavulanate Rash       Medical History:    Past Medical History:   Diagnosis Date    Anxiety     Genital herpes     History of hyperlipidemia, improved as of 2016 with diet and exercise     Menstrual migraine          Current Mental Status Exam:   Appearance:  Appropriate    Eye Contact:  Good   Psychomotor:  Normal       Gait / station:  no problem  Attitude / Demeanor: Cooperative   Speech      Rate / Production: Normal/ Responsive      Volume:  Normal  volume      Language:  no problems and good  Mood:   Normal  Affect:   Appropriate    Thought Content: Clear   Thought Process: Goal Directed  Logical       Associations: No loosening of associations  Insight:   Good   Judgment:  Intact   Orientation:  All  Attention/concentration: Good    Substance Use:   Patient did not report a family history of substance use concerns; see medical history section for details.  Patient has not received chemical dependency treatment in the past.  Patient has not ever been to detox.      Patient is not currently receiving any chemical dependency treatment.           Substance History of use Age of first use Date of last use     Pattern and duration of use (include amounts and frequency)   Alcohol currently use   15 02/27/24 REPORTS SUBSTANCE USE: reports using  substance 1 times per month and has 1 drink at a time.   Patient reports heaviest use was in college.   Cannabis   never used     REPORTS SUBSTANCE USE: N/A     Amphetamines   never used     REPORTS SUBSTANCE USE: N/A   Cocaine/crack    never used       REPORTS SUBSTANCE USE: N/A   Hallucinogens never used         REPORTS SUBSTANCE USE: N/A   Inhalants never used         REPORTS SUBSTANCE USE: N/A   Heroin never used         REPORTS SUBSTANCE USE: N/A   Other Opiates never used     REPORTS SUBSTANCE USE: N/A   Benzodiazepine   never used     REPORTS SUBSTANCE USE: N/A   Barbiturates never used     REPORTS SUBSTANCE USE: N/A   Over the counter meds never used     REPORTS SUBSTANCE USE: N/A   Caffeine currently use 4  Switched to decaf coffee years ago REPORTS SUBSTANCE USE: reports using substance 1-2 times per week and has 1 tea at a time.   Patient reports heaviest use is current use.   Nicotine  never used     REPORTS SUBSTANCE USE: N/A   Other substances not listed above:  Identify:  never used     REPORTS SUBSTANCE USE: N/A     Patient reported the following problems as a result of their substance use: no problems, not applicable.    Substance Use: No symptoms    Based on the negative CAGE score and clinical interview there  are not indications of drug or alcohol abuse.    Significant Losses / Trauma / Abuse / Neglect Issues:   Patient did not  serve in the .  There are indications or report of significant loss, trauma, abuse or neglect issues related to: are no indications and client denies any losses, trauma, abuse, or neglect concerns.  Concerns for possible neglect are not present.     Safety Assessment:   Patient denies current homicidal ideation and behaviors.  Patient denies current self-injurious ideation and behaviors.    Patient denied risk behaviors associated with substance use.   Patient denies any high risk behaviors associated with mental health symptoms.  Patient reports the following  "current concerns for their personal safety: None.  Patient reports there are not firearms in the house.        History of Safety Concerns:  Patient denied a history of homicidal ideation.     Patient denied a history of personal safety concerns.    Patient denied a history of assaultive behaviors.    Patient denied a history of sexual assault behaviors.     Patient denied a history of risk behaviors associated with substance use.  Patient denies any history of high risk behaviors associated with mental health symptoms.  Patient reports the following protective factors: dedication to family or friends; safe and stable environment; daily obligations; sense of personal control or determination    Risk Plan:  See Recommendations for Safety and Risk Management Plan    Review of Symptoms per patient report:   Depression: Change in sleep, Excessive or inappropriate guilt, Change in energy level, Difficulties concentrating, Change in appetite, and Feeling sad, down, or depressed  Ama:  No Symptoms  Psychosis: No Symptoms  Anxiety: Excessive worry, Nervousness, Poor concentration, and Irritability  Panic:  No symptoms (was having some after 9/11 and took Xanax as needed for a few months)   Post Traumatic Stress Disorder:  No Symptoms   Eating Disorder: No Symptoms  ADD / ADHD:  Inattentive, Poor task completion, Poor organizational skills, and Distractibility  Conduct Disorder: No symptoms  Autism Spectrum Disorder: No symptoms  Obsessive Compulsive Disorder: No symptoms - she will \"do weird looking at patterns back and forth and counting and checking things\" she will check things a lot to the point that it is weird, or she doesn't check and can't find things or know where things are will re-read an important form multiple times - this doesn't feel disruptive or interfering with functioning     Patient reports the following compulsive behaviors and treatment history:   Chews inside of her mouth - has done this since " childhood. She will waste time being online and shopping (more than she would like to be doing)     Diagnostic Criteria:   Generalized Anxiety Disorder  A. Excessive anxiety and worry about a number of events or activities (such as work or school performance).   B. The person finds it difficult to control the worry.  C. Select 3 or more symptoms (required for diagnosis). Only one item is required in children.   - Restlessness or feeling keyed up or on edge.    - Being easily fatigued.    - Difficulty concentrating or mind going blank.    - Irritability.    - Sleep disturbance (difficulty falling or staying asleep, or restless unsatisfying sleep).   D. The focus of the anxiety and worry is not confined to features of an Axis I disorder.  E. The anxiety, worry, or physical symptoms cause clinically significant distress or impairment in social, occupational, or other important areas of functioning.   F. The disturbance is not due to the direct physiological effects of a substance (e.g., a drug of abuse, a medication) or a general medical condition (e.g., hyperthyroidism) and does not occur exclusively during a Mood Disorder, a Psychotic Disorder, or a Pervasive Developmental Disorder. Major Depressive Disorder   - Depressed mood. Note: In children and adolescents, can be irritable mood.     - Decreased sleep.    - Fatigue or loss of energy.    - Feelings of worthlessness or inappropriate and excessive guilt.    - Diminished ability to think or concentrate, or indecisiveness.   B) The symptoms cause clinically significant distress or impairment in social, occupational, or other important areas of functioning  C) The episode is not attributable to the physiological effects of a substance or to another medical condition  D) The occurence of major depressive episode is not better explained by other thought / psychotic disorders  E) There has never been a manic episode or hypomanic episode    Functional Status:  Patient  reports the following functional impairments:  academic performance, home life with family, management of the household and or completion of tasks, and work / vocational responsibilities.         Clinical Summary:  1. Psychosocial, Cultural and Contextual Factors: work stress; in couples counseling with .  2. Principal DSM5 Diagnoses  (Sustained by DSM5 Criteria Listed Above):   296.31 (F33.0) Major Depressive Disorder, Recurrent Episode, Mild _  300.02 (F41.1) Generalized Anxiety Disorder.  RULE OUT: ADHD  5. Prognosis: Expect Improvement and Maintain Current Status / Prevent Deterioration.  6. Likely consequences of symptoms if not treated: issues at home/work.  7. Client strengths include:  creative, educated, employed, goal-focused, good listener, has a previous history of therapy, insightful, intelligent, motivated, open to learning, open to suggestions / feedback, responsible parent, support of family, friends and providers, supportive, wants to learn, and willing to ask questions .     Recommendations:     1. Plan for Safety and Risk Management:   Safety and Risk: Recommended that patient call 911 or go to the local ED should there be a change in any of these risk factors..          Report to child / adult protection services was NA.     4. Resources/Service Plan:    services are not indicated.   Modifications to assist communication are not indicated.   Additional disability accommodations are not indicated.      5. Collaboration:   Collaboration / coordination of treatment will be initiated with the following  support professionals: primary care physician.      6.  Referrals:   The following referral(s) will be initiated: Outpatient [facility]. Order placed today      A Release of Information has been obtained for the following:  NA .     Clinical Substantiation/medical necessity for the above recommendations:  Medical necessity criteria is warranted in order to: Measure a  psychological disorder and its severity and functional impairment to determine psychiatric diagnosis when a mental illness is suspected, or to achieve a differential diagnosis from a range of medical/psychological disorders that present with similar constellations of symptoms (e.g., determination and measurement of anxiety severity and impact in the presence of ongoing asthma or heart disease), Perform symptom measurement to objectively measure treatment effectiveness and/or determine the need to refer for pharmacological treatment or other medical evaluation (e.g., based on severity and chronicity of symptoms), and Evaluate primary symptoms of impaired attention and concentration that can occur in many neurological and psychiatric conditions. .    7. QUINTEN:    QUINTEN:  Discussed the general effects of drugs and alcohol on health and well-being. Provider gave patient printed information about the  effects of chemical use on their health and well being. Recommendations:  NA .     8. Records:   These were reviewed at time of assessment.   Information in this assessment was obtained from the medical record and  provided by patient who is a good historian.    Patient will have open access to their mental health medical record.    9.   Interactive Complexity: No    10. Safety Plan:  Patient denied any current/recent/lifetime history of suicidal ideation and/or behaviors.  No safety plan indicated at this time.     Provider Name/ Credentials:  Maria De Jesus Vieyra, PhD, LP  March 6, 2024

## 2024-03-06 NOTE — PROGRESS NOTES
Name:Ricardo Murry  MRN:? [id number]  :? 1975  ?   Client completed the [place] Multiphasic Personality Inventory-3 (MMPI-3), a self-report personality inventory, as part of her evaluation. Validity scales indicate that the client was able to comprehend and respond relevantly to the test items. Client responded in an open and consistent manner, resulting in a valid profile. She is reporting diffuse cognitive difficulties including problems with memory, confusion and attention and concentration. She reports being passive, indecisive, and inefficacious. She feels she is incapable of making decisions and dealing effectively with crisis situations. She reports an above average level of stress. She is prone to anxiety, worry, and rumination. She experiences intrusive ideation and problems with sleep. She lacks positive emotional experiences and is socially introverted and disengaged. She reports disliking people and being around them and preferring to be alone.

## 2024-03-17 ENCOUNTER — ANCILLARY PROCEDURE (OUTPATIENT)
Dept: MRI IMAGING | Facility: CLINIC | Age: 49
End: 2024-03-17
Payer: COMMERCIAL

## 2024-03-17 DIAGNOSIS — Z80.3 FAMILY HISTORY OF MALIGNANT NEOPLASM OF BREAST: ICD-10-CM

## 2024-03-17 DIAGNOSIS — Z12.39 BREAST CANCER SCREENING, HIGH RISK PATIENT: ICD-10-CM

## 2024-03-17 PROCEDURE — 77049 MRI BREAST C-+ W/CAD BI: CPT

## 2024-03-17 PROCEDURE — A9585 GADOBUTROL INJECTION: HCPCS

## 2024-03-17 RX ORDER — GADOBUTROL 604.72 MG/ML
7.5 INJECTION INTRAVENOUS ONCE
Status: COMPLETED | OUTPATIENT
Start: 2024-03-17 | End: 2024-03-17

## 2024-03-17 RX ADMIN — GADOBUTROL 6 ML: 604.72 INJECTION INTRAVENOUS at 09:14

## 2024-03-22 ENCOUNTER — DOCUMENTATION ONLY (OUTPATIENT)
Dept: PSYCHOLOGY | Facility: CLINIC | Age: 49
End: 2024-03-22
Payer: COMMERCIAL

## 2024-03-22 ENCOUNTER — VIRTUAL VISIT (OUTPATIENT)
Dept: PSYCHOLOGY | Facility: CLINIC | Age: 49
End: 2024-03-22
Payer: COMMERCIAL

## 2024-03-22 DIAGNOSIS — F41.1 GENERALIZED ANXIETY DISORDER: ICD-10-CM

## 2024-03-22 DIAGNOSIS — F33.0 MAJOR DEPRESSIVE DISORDER, RECURRENT EPISODE, MILD (H): Primary | ICD-10-CM

## 2024-03-22 PROCEDURE — 90832 PSYTX W PT 30 MINUTES: CPT | Mod: 95 | Performed by: PSYCHOLOGIST

## 2024-03-22 NOTE — PROGRESS NOTES
"Name: Joyce Murry  MRN:?[id number]  :?1975      Sukumar Adult ADHD Rating Scale-IV: Self and Other Reports (BAARS-IV)   The BAARS-IV assesses for symptoms of ADHD that are experienced in one's daily life. This assessment measure includes self and collateral rating scales designed to provide information regarding current and childhood symptoms of ADHD including inattention, hyperactivity, and impulsivity. Self-report scores are reported as percentiles. Scores at the 76th-83rd percentile are considered marginal, scores at the 84th-92nd percentile are considered borderline, scores at the 93rd-95th percentile are considered mild, scores at the 96th-98th percentile are considered moderate, and those at the 99th percentile are considered severe. Collateral or \"other\" rating scales are reported as number of symptoms observed in comparison to those reported by the client. Norms and percentile scores are not available for collateral reports.    ?   Current Symptoms Scale--Self Report:    Client completed the self-report inventory of current symptoms. The results indicate that the client's Total ADHD Score was 44 which places them in the 97th percentile for overall ADHD symptoms. In addition, the client endorsed the following occur \"often\" or \"very often\": 7/9 (98th percentile) Inattention symptoms, 0/9 (1-75th?percentile) Hyperactivity/Impulsivity symptoms, and 6/9 (95th percentile) Sluggish Cognitive Tempo symptoms. Overall, the results suggest the client is reporting moderate symptoms of inattention at this time.    ?   Current Symptoms Scale--Other Report:   Client's spouse completed the collateral report inventory of current symptoms. Based on the collateral contact's observation of symptoms, the client demonstrates the following \"often\" or \"very often\": 2/9 Inattention symptoms, 0/5 Hyperactivity symptoms, 0/4 Impulsivity symptoms, and 2/9 Sluggish Cognitive Tempo symptoms. The client's Total ADHD Score was 26. " "The collateral- and self-report scores are discrepant. He did not note symptoms of ADHD at this time.     Childhood Symptoms Scale--Self-Report:   Client completed the self-report inventory of childhood symptoms. The results indicate that the client's Total ADHD Score was 37 which places them in the 89th percentile for overall ADHD symptoms in childhood. In addition, the client endorsed having experienced the following \"often\" or \"very often\": 7/9 (97th percentile) Inattention symptoms and 1/9 (84th percentile) Hyperactivity-Impulsivity symptoms. Overall, the results suggest the client experienced moderate symptoms of inattention in childhood.       Childhood Symptoms Scale--Other Report:   Client's sister completed the collateral report inventory of childhood symptoms. Based on the collateral contact's recollection of client's childhood symptoms, the client demonstrated the following \"often\" or \"very often\": 5/9 Inattention symptoms and 4/9 Hyperactivity-Impulsivity symptoms. The client's Total ADHD Score was 45. The collateral-report and self-report scores are similar and suggest Client experienced symptoms of inattention in childhood. Client's sister also noted more symptoms of hyperactivity/impulsivity than Client reported.    ?  Sukumar Functional Impairment Scale: Self and Other Reports (BFIS)   The BFIS is used to assess an individuals' psychosocial impairment in major life/daily activities that may be due to a mental health disorder. This assessment measure includes self and collateral rating scales. Self-report scores are reported as percentiles. Scores at the 76th-83rd percentile are considered marginal, scores at the 84th-92nd percentile are considered borderline, scores at the 93rd-95th percentile are considered mild, scores at the 96th-98th percentile are considered moderate, and those at the 99th percentile are considered severe. Collateral or \"other\" rating scales are reported as number of symptoms " "observed in comparison to those reported by the client. Norms and percentile scores are not available for collateral reports.    ?   Results indicate the client identified impairment (scores at or greater than 93rd percentile) in the following areas: home-chores, community activities, dating/marriage, money management, and daily responsibilities. The client's Mean Impairment Score was 4.6 (86th percentile) indicating the client is reporting borderline impairment in functioning across domains. Client's spouse completed the collateral report which indicated discrepant results. His scores were generally lower (e.g., Mean Impairment Score 3.31). He noted impairment in the areas of: home-family, home-chores, dating/marriage and daily responsibilities. Client's sister completed the collateral report which indicated similar results (e.g., Mean Impairment Score 5.07). She noted impairment in the areas of: home-family, home-chores, dating/marriage, money management, driving and daily responsibilities.     Sukumar Deficits in Executive Functioning Scale (BDEFS)   The BDEFS is a measure used for evaluating dimensions of adult executive functioning in daily life. This assessment measure includes self and collateral rating scales. Self-report scores are reported as percentiles. Scores at the 76th-83rd percentile are considered marginal, scores at the 84th-92nd percentile are considered borderline, scores at the 93rd-95th percentile are considered mild, scores at the 96th-98th percentile are considered moderate, and those at the 99th percentile are considered severe. Collateral or \"other\" rating scales are reported as number of symptoms observed in comparison to those reported by the client. Norms and percentile scores are not available for collateral reports.    ?   Results indicate the client's Total Executive Functioning Score was 228 (99th percentile). The ADHD-Executive Functioning Index score was 32 (99th percentile). These " scores suggest the client is reporting severe deficits in executive functioning. Client noted the following deficit: self-management to time (severe); self-organization/problem solving (mild); self-motivation (severe); and self-regulation of emotions (mild). Client's spouse completed the collateral report with demonstrated discrepant results. His scores were lower. He noted a deficit in the area of: self-management to time. Client's sister completed the collateral report with demonstrated discrepant results. Her scores were lower. She noted deficits in the areas of: self-management to time and self-organization/problem-solving.     Generalized Anxiety Disorder Questionnaire (GABRIEL-7)   This questionnaire is designed to screen for anxiety in adults. Based on the client's score of 9, they are reporting mild symptoms of anxiety at this time. Client identified the following symptoms of anxiety: feeling nervous/anxious/on edge; not being able to stop or control worrying; worrying too much about different things; trouble relaxing; restlessness, becoming easily annoyed or irritable, and feeling afraid as if something awful might happen.    Patient Health Questionnaire- 9 (PHQ-9)    This questionnaire is designed to screen for depression in adults. Based on the client's score of 7, they are reporting mild symptoms of depression at this time. Client identified the following symptoms of depression: little interest or pleasure in doing things; feeling down/depressed/hopeless; trouble falling asleep/staying asleep/sleeping too much; feeling tired or having little energy, poor appetite or overeating, and feeling bad about self.

## 2024-03-22 NOTE — PROGRESS NOTES
Progress Note       Client Name:               Joyce Murry          Date:   3/22/2024         Service Type:             Individual      Telemedicine Visit: The patient's condition can be safely assessed and treated via synchronous audio and visual telemedicine encounter.        Reason for Telemedicine Visit: Services only offered telehealth      Originating Site (Patient Location): Patient's home            Distant Location (provider location):? Off-site      Consent:  The patient/guardian has verbally consented to: the potential risks and benefits of telemedicine (video visit) versus in person care; bill my insurance or make self-payment for services provided; and responsibility for payment of non-covered services.       Mode of Communication:? Video Conference via [employer]      As the provider I attest to compliance with applicable laws and regulations related to telemedicine.     Session Start Time:              9:00                          Session End Time: 9:26      Session Length:      26 minutes      Session #:     2       Attendees:     Client attended alone      Intervention: reviewed strategies for managing symptoms of anxiety and depression; motivational interviewing: explored potential barriers for making healthy changes      Identifying Information:   Client is a 48 year old, other,  female. Client was referred for a diagnostic assessment by PCP.  The purpose of this evaluation is to: provide treatment recommendations and clarify diagnosis.  Client is currently  employed part time and reports she is able to function appropriately at work.. Client attended the session alone.          Client's Statement of Presenting Concern:   Client reported seeking services at this time for diagnostic assessment and recommendations for treatment. Client's presenting concerns include: Client has difficulties with time management. She can't get organized. She feels overwhelmed constantly. She feels irritable  "and \"controlling.\" She can't pay attention to details and makes mistakes. She can't sustain attention. She gets side-tracked and can't finish tasks. She loses things often. Client feels inadequate. Her life feels chaotic and out of control and routine tasks feel impossible to tackle. She avoids projects that require sustained mental effort. She feels she is unable to reach goals. She thinks that she has potential and ideas and creativity but \"I can't get from A to B because I get trapped and don't know where to start or what to do. I feel paralyzed and overwhelmed and I don't know what to do.\" Client is disorganized. She likes things to look \"pretty and organized\" but her paperwork and important documents are not organized. She has clothes lying around. She has picture frames sitting around that she hasn't gotten around to hanging up. Her desk is messy. Her closet has piles of stuff in it and she doesn't know what to do with it, so she leaves it there. Client has difficulty remembering things. She uses reminders on her phone to stay on top of things. She has a wall calendar that she jots notes on. She knows when she has appointments and will attend those. Client has difficulty listening in conversations. She will \"zone out\" at times. Client has had to ask people to repeat themselves (\"can you say that again?\" because she is thinking of something else). Client also has a tendency to repeat herself at times (her family will tell her that she already told them something). She can talk excessively (her  has told her that she talks \"at\" the kids too much). She can lie in bed and read her book and relax. Client stated that symptoms have resulted in the following functional impairments: academic performance, home life with family, management of the household and or completion of tasks, and work / vocational responsibilities.          History of Presenting Concern:   Client reported that she has not completed a " "previous ADHD diagnostic assessment.  Client has not received a previous diagnosis of ADHD. Client reported that medication has not been prescribed medication to address these problems. Client reported that these problems began in childhood. As she has gotten older, she has realized that she has always been this way but she has learned how to cope with these things. She has been resistant to medication, but she is becoming more open to medication as a potential treatment option at this point in her life. Client explained that, twenty years ago, Client's older sister is a physician and attended a conference about ADHD with their mother and her mother commented \"Oh wow, that sounds like Joyce.\" Client has not attempted to resolve these concerns in the past. She has been reading a book called \"Women with ADHD\" (her sister gifted her this for Belle). Client reported that other professionals are involved in providing support / services. She is in couples therapy with her .         Social History:   As a child, client reported that she failed to complete assigned chores in the home environment, had problems getting ready for school in the morning, had problems with organization and keeping track of items, and forgot school work or other items between home and school. Client reported no difficulty with childhood peer relationships. As a child, client reported having regular and consistent sleep patterns. Client reported currently experiencing sleep disturbance, including: insomnia. Sometimes it can be hard to sleep. She often wakes up during the night. Client reported sleeping approximately 6-9 hours per night.  Client reported that she has not completed a sleep study. Client reported having a well balanced diet.  There are not significant nutritional concerns. Client reported sporadic exercise patterns.         Client's highest education level was college graduate. Client graduated high school in 1991. She " "estimated she obtained mostly Bs. During the elementary, middle, and high school years, patient recalls academic strengths in the area of art. Client reported experiencing academic problems in math. Client did identify the following learning problems: attention and concentration. Client did not receive tutoring services during the school years. Client did not receive special education services. Client reported failure to finish or complete homework. Client had a hard time getting work done. She disliked reading and \"did the bare minimum of homework.\" She struggled with focusing and getting assignments done. Client would get things done at the last minute. She procrastinated and \"skimmed and skipped\" assignments. If she actually tried, she would have been a better student. Client did not have attendance issues. Client would daydream during class. It was hard to pay attention. She often felt bored listening to the teacher. She thinks her parents were disappointed by her grades (she was told she had potential and was smart and she just needed to work harder). Both of her older sisters were high achievers and she didn't feel she could do as well as them. Client was a well-behaved child but had difficulty getting things done. Client did attend post-secondary school.  She graduated from college in 1996 from [employer] in [facility]) with a degree in literature. She estimated she obtained mostly Bs.  She stated, \"I didn't do well. I had freedom all of the sudden and it was so different from the really strict school I had gone to previously, so I skipped class a lot and didn't care. Everything was done at the last minute.\" She couldn't \"get it together\" to do the work and she thinks she could have been a better student. When she was in class, she couldn't concentrate. She wasn't sleeping very well and felt tired often. She fell asleep in an early Armenian class a few times. She was prioritizing her " "social life. It was important to her to feel connected socially (she thinks she drank too much to deal with social discomfort). She stated, \"It was hard for me to apply myself with things that didn't interest me or have an immediate pay off.\" Client studied interior design (took classes for credit for a year) - she applied to the associate degree program and completed one year before moving with her  to [place]. She did not finish this program.       Client reported that she is currently employed part-time. 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 She has been with this company for 2.5 years.  Client reported that the current job is a good fit for her skills and personality. Client reported that she been frequently late for work, frequently made mistakes with poor attention to detail, often felt bored, often been late in completing projects, disorganized behavior, and distractible behavior . She often rushes to finish things. She feels generally disorganized. She is a reliable person, and she feels she has learned how to cope. She doesn't want to disappoint people so she does her best. She is a good employee, but she feels the way that she gets things done is a \"scramble.\" It feels stressful to get things done. She can get interrupted and distracted by things that are more interesting. It can be difficult to prioritize tasks and manage her time. 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 Client went to work for [employer] and left this job to go back to school to study interior design in her early 30s and did very well there \"because I was interested in the work and applied myself.\" She worked for an  and a textile " company. Her  was offered a post-doc position at the [employer] and then they moved. 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 The longest period of employment has been 8 years (company she started working for in college). Client has not been terminated from a place of employment.          Risk Taking Behaviors:   Client reported a history of the following risk taking behaviors: excessive spending - she will shop a lot and return things         Motor Vehicle Operation:   Client has received a 's license.  Client has not received any moving violations.  Client reported the following driving habits: attentive and cautious.  According to client, other people are comfortable riding as a passenger when she is driving.           Mental Status Assessment:   Appearance:                                 Appropriate    Eye Contact:                                 Good    Psychomotor Behavior:        Normal    Attitude:                                            Cooperative    Orientation:                                    All   Speech   Rate / Production:     Normal    Volume:                            Normal    Mood:                                                 Normal   Affect:                                                 Appropriate    Thought Content:                      Clear    Thought Form:                             Coherent  Logical    Insight:                                            Good          Review of Symptoms:   Depression:     Change in sleep, Excessive or inappropriate guilt, Change in energy level, Difficulties concentrating, Change in appetite, and Feeling sad, down, or depressed  Ama:             No Symptoms  Psychosis:       No Symptoms  Anxiety:           Excessive worry, Nervousness, Poor concentration, and Irritability  Panic:              No symptoms (was having some after 9/11 and took Xanax as needed for a few months)   Post Traumatic Stress Disorder:  No Symptoms  "  Eating Disorder:          No Symptoms  ADD / ADHD:              Inattentive, Poor task completion, Poor organizational skills, and Distractibility  Conduct Disorder:       No symptoms  Autism Spectrum Disorder:     No symptoms  Obsessive Compulsive Disorder:       No symptoms - she will \"do weird looking at patterns back and forth and counting and checking things\" she will check things a lot to the point that it is weird, or she doesn't check and can't find things or know where things are will re-read an important form multiple times - this doesn't feel disruptive or interfering with functioning   Reckless Behavior:  No symptoms            Safety Issues and Plan for Safety and Risk Management:   Client denies a history of suicidal ideation, suicide attempts, self-injurious behavior, homicidal ideation, homicidal behavior, and and other safety concerns      Client denies current fears or concerns for personal safety.   Client denies current or recent suicidal ideation or behaviors.   Client denies current or recent homicidal ideation or behaviors.   Client denies current or recent self injurious behavior or ideation.   Client denies other safety concerns.   Client reports there are no firearms in the house.   Recommended that patient call 911 or go to the local ED should there be a change in any of these risk factors.            Diagnostic Criteria:   Attention Deficit Hyperactivity Disorder  A) A persistent pattern of inattention and/or hyperactivity-impulsivity that interferes with functioning or development, as characterized by (1) Inattention and/or (2) Hyperactivity and Impulsivity  - Often fails to give close attention to details or makes careless mistakes in schoolwork, at work, or during other activities  - Often has difficulty sustaining attention in tasks or play activities  - Often does not seem to listen when spoken to directly  - Often does not follow through on instructions and fails to finish " schoolwork, chores, or duties in the workplace  - Often has difficulty organizing tasks and activities  - Is often easily distractedby extraneous stimuli       Generalized Anxiety Disorder  A. Excessive anxiety and worry about a number of events or activities (such as work or school performance).   B. The person finds it difficult to control the worry.  C. Select 3 or more symptoms (required for diagnosis). Only one item is required in children.   - Restlessness or feeling keyed up or on edge.    - Being easily fatigued.    - Difficulty concentrating or mind going blank.    - Irritability.    - Sleep disturbance (difficulty falling or staying asleep, or restless unsatisfying sleep).   D. The focus of the anxiety and worry is not confined to features of an Axis I disorder.  E. The anxiety, worry, or physical symptoms cause clinically significant distress or impairment in social, occupational, or other important areas of functioning.   F. The disturbance is not due to the direct physiological effects of a substance (e.g., a drug of abuse, a medication) or a general medical condition (e.g., hyperthyroidism) and does not occur exclusively during a Mood Disorder, a Psychotic Disorder, or a Pervasive Developmental Disorder.     Major Depressive Disorder   - Depressed mood. Note: In children and adolescents, can be irritable mood.     - Decreased sleep.    - Fatigue or loss of energy.    - Feelings of worthlessness or inappropriate and excessive guilt.    - Diminished ability to think or concentrate, or indecisiveness.   B) The symptoms cause clinically significant distress or impairment in social, occupational, or other important areas of functioning  C) The episode is not attributable to the physiological effects of a substance or to another medical condition  D) The occurence of major depressive episode is not better explained by other thought / psychotic disorders  E) There has never been a manic episode or hypomanic  episode      Functional Status:   Client's symptoms have caused reduced functional status in the following areas: academic performance, home life with family, management of the household and or completion of tasks, and work / vocational responsibilities.            DSM-5Diagnoses: (Sustained by DSM5 Criteria Listed Above)      296.31 (F33.0) Major Depressive Disorder, Recurrent Episode, Mild   300.02 (F41.1) Generalized Anxiety Disorder  RULE OUT: ADHD    Attendance Agreement:   Client has signed Attendance Agreement:No: unable to sign via telehealth         Preliminary Plan:   The client reports no currently identified Worship, ethnic or cultural issues relevant to therapy.       services are not indicated.      Modifications to assist communication are not indicated.      Collaboration / coordination of treatment will be initiated with the following support professionals: primary care physician.      Referral to another professional/service is not indicated at this time..      A Release of Information is not needed at this time.      Client was given self and collaborative rating scales to be completed prior to the next appointment.  Client consented to sending/receiving these measures via email.  Depression and anxiety rating scales were completed.  A third appointment was not scheduled at this time.       Report to child / adult protection services was NA.      Patient will have open access to their mental health medical record.      Maria De Jesus Vieyra, PhD, LP                         March 22, 2024

## 2024-03-27 ENCOUNTER — DOCUMENTATION ONLY (OUTPATIENT)
Dept: PSYCHOLOGY | Facility: CLINIC | Age: 49
End: 2024-03-27
Payer: COMMERCIAL

## 2024-03-27 NOTE — PROGRESS NOTES
[facility]   ADHD Evaluation      Patient: Joyce Murry  YOB: 1975  MRN: [id number]     Date(s) of assessment: Diagnostic Assessment (3/6/24; 3/22/24); MMPI-3 (Administered 3/6/24; Interpreted on 3/6/24); Sukumar self-report and collateral measures scored and interpreted (3/22/24)    Information about appointment:   Client attended two sessions to aid in determining client's mental health diagnosis or diagnoses and treatment recommendations that best address client concerns. Available medical records were reviewed. There were no previous psychological evaluations for review. A diagnostic assessment was conducted at the initial appointment. Client completed several rating scales to assist in assessing attention-related and other mental health symptoms that may be causing impairments in functioning. Rating scales were also completed by a collateral contact. Personality testing was also completed to aid in diagnostic clarification.   ?   Assessment tools:    Sukumar Adult ADHD Rating Scale-IV: Self and Other Reports (BAARS-IV), Sukumar Functional Impairment Scale: Self and Other Reports (BFIS), Sukumar Deficits in Executive Functioning Scale: Self and Other Reports (BDEFS), Patient Health Questionnaire-9 (PHQ-9), and Generalized Anxiety Disorder-7 (GABRIEL-7); [place] Multiphasic Personality Inventory-Third Edition (MMPI-3) *Testing administered remotely    ?   Assessment Results:   Behavioral Observations:   Client arrived to each session on-time. She was pleasant and cooperative at all times. Client did not demonstrate significant difficulties with inattention or hyperactivity/impulsivity during the sessions. The following results are likely to be an accurate reflection of Client's current functioning.      Sukumar Adult ADHD Rating Scale-IV: Self and Other Reports (BAARS-IV)   The BAARS-IV assesses for symptoms of ADHD that are experienced in one's daily life. This assessment measure  "includes self and collateral rating scales designed to provide information regarding current and childhood symptoms of ADHD including inattention, hyperactivity, and impulsivity. Self-report scores are reported as percentiles. Scores at the 76th-83rd percentile are considered marginal, scores at the 84th-92nd percentile are considered borderline, scores at the 93rd-95th percentile are considered mild, scores at the 96th-98th percentile are considered moderate, and those at the 99th percentile are considered severe. Collateral or \"other\" rating scales are reported as number of symptoms observed in comparison to those reported by the client. Norms and percentile scores are not available for collateral reports.    ?   Current Symptoms Scale--Self Report:    Client completed the self-report inventory of current symptoms. The results indicate that the client's Total ADHD Score was 44 which places them in the 97th percentile for overall ADHD symptoms. In addition, the client endorsed the following occur \"often\" or \"very often\": 7/9 (98th percentile) Inattention symptoms, 0/9 (1-75th?percentile) Hyperactivity/Impulsivity symptoms, and 6/9 (95th percentile) Sluggish Cognitive Tempo symptoms. Overall, the results suggest the client is reporting moderate symptoms of inattention at this time.    ?   Current Symptoms Scale--Other Report:   Client's spouse completed the collateral report inventory of current symptoms. Based on the collateral contact's observation of symptoms, the client demonstrates the following \"often\" or \"very often\": 2/9 Inattention symptoms, 0/5 Hyperactivity symptoms, 0/4 Impulsivity symptoms, and 2/9 Sluggish Cognitive Tempo symptoms. The client's Total ADHD Score was 26. The collateral- and self-report scores are discrepant. He did not note symptoms of ADHD at this time.      Childhood Symptoms Scale--Self-Report:   Client completed the self-report inventory of childhood symptoms. The results indicate " "that the client's Total ADHD Score was 37 which places them in the 89th percentile for overall ADHD symptoms in childhood. In addition, the client endorsed having experienced the following \"often\" or \"very often\": 7/9 (97th percentile) Inattention symptoms and 1/9 (84th percentile) Hyperactivity-Impulsivity symptoms. Overall, the results suggest the client experienced moderate symptoms of inattention in childhood.       Childhood Symptoms Scale--Other Report:   Client's sister completed the collateral report inventory of childhood symptoms. Based on the collateral contact's recollection of client's childhood symptoms, the client demonstrated the following \"often\" or \"very often\": 5/9 Inattention symptoms and 4/9 Hyperactivity-Impulsivity symptoms. The client's Total ADHD Score was 45. The collateral-report and self-report scores are similar and suggest Client experienced symptoms of inattention in childhood. Client's sister also noted more symptoms of hyperactivity/impulsivity than Client reported.    ?  Sukumar Functional Impairment Scale: Self and Other Reports (BFIS)   The BFIS is used to assess an individuals' psychosocial impairment in major life/daily activities that may be due to a mental health disorder. This assessment measure includes self and collateral rating scales. Self-report scores are reported as percentiles. Scores at the 76th-83rd percentile are considered marginal, scores at the 84th-92nd percentile are considered borderline, scores at the 93rd-95th percentile are considered mild, scores at the 96th-98th percentile are considered moderate, and those at the 99th percentile are considered severe. Collateral or \"other\" rating scales are reported as number of symptoms observed in comparison to those reported by the client. Norms and percentile scores are not available for collateral reports.    ?   Results indicate the client identified impairment (scores at or greater than 93rd percentile) in the " "following areas: home-chores, community activities, dating/marriage, money management, and daily responsibilities. The client's Mean Impairment Score was 4.6 (86th percentile) indicating the client is reporting borderline impairment in functioning across domains. Client's spouse completed the collateral report which indicated discrepant results. His scores were generally lower (e.g., Mean Impairment Score 3.31). He noted impairment in the areas of: home-family, home-chores, dating/marriage and daily responsibilities. Client's sister completed the collateral report which indicated similar results (e.g., Mean Impairment Score 5.07). She noted impairment in the areas of: home-family, home-chores, dating/marriage, money management, driving and daily responsibilities.     Sukumar Deficits in Executive Functioning Scale (BDEFS)   The BDEFS is a measure used for evaluating dimensions of adult executive functioning in daily life. This assessment measure includes self and collateral rating scales. Self-report scores are reported as percentiles. Scores at the 76th-83rd percentile are considered marginal, scores at the 84th-92nd percentile are considered borderline, scores at the 93rd-95th percentile are considered mild, scores at the 96th-98th percentile are considered moderate, and those at the 99th percentile are considered severe. Collateral or \"other\" rating scales are reported as number of symptoms observed in comparison to those reported by the client. Norms and percentile scores are not available for collateral reports.    ?   Results indicate the client's Total Executive Functioning Score was 228 (99th percentile). The ADHD-Executive Functioning Index score was 32 (99th percentile). These scores suggest the client is reporting severe deficits in executive functioning. Client noted the following deficit: self-management to time (severe); self-organization/problem solving (mild); self-motivation (severe); and " self-regulation of emotions (mild). Client's spouse completed the collateral report with demonstrated discrepant results. His scores were lower. He noted a deficit in the area of: self-management to time. Client's sister completed the collateral report with demonstrated discrepant results. Her scores were lower. She noted deficits in the areas of: self-management to time and self-organization/problem-solving.        Summary of [place] Multiphasic Personality Inventory--Third Edition    Client completed the [place] Multiphasic Personality Inventory-3 (MMPI-3), a self-report personality inventory, as part of her evaluation. Validity scales indicate that the client was able to comprehend and respond relevantly to the test items. Client responded in an open and consistent manner, resulting in a valid profile. She is reporting diffuse cognitive difficulties including problems with memory, confusion and attention and concentration. She reports being passive, indecisive, and inefficacious. She feels she is incapable of making decisions and dealing effectively with crisis situations. She reports an above average level of stress. She is prone to anxiety, worry, and rumination. She experiences intrusive ideation and problems with sleep. She lacks positive emotional experiences and is socially introverted and disengaged. She reports disliking people and being around them and preferring to be alone.   ?????   Generalized Anxiety Disorder Questionnaire (GABRIEL-7)   This questionnaire is designed to screen for anxiety in adults. Based on the client's score of 9, they are reporting mild symptoms of anxiety at this time. Client identified the following symptoms of anxiety: feeling nervous/anxious/on edge; not being able to stop or control worrying; worrying too much about different things; trouble relaxing; restlessness, becoming easily annoyed or irritable, and feeling afraid as if something awful might happen.     Patient Health  "Questionnaire- 9 (PHQ-9)    This questionnaire is designed to screen for depression in adults. Based on the client's score of 7, they are reporting mild symptoms of depression at this time. Client identified the following symptoms of depression: little interest or pleasure in doing things; feeling down/depressed/hopeless; trouble falling asleep/staying asleep/sleeping too much; feeling tired or having little energy, poor appetite or overeating, and feeling bad about self.     Summary (based on clinical interview, review of records, test results):   Client is a 48-year-old, other,  female. Client was referred for a diagnostic assessment by PCP. The purpose of this evaluation is to: provide treatment recommendations and clarify diagnosis. Client's presenting concerns include: Client has difficulties with time management. She can't get organized. She feels overwhelmed constantly. She feels irritable and \"controlling.\" She can't pay attention to details and makes mistakes. She can't sustain attention. She gets side-tracked and can't finish tasks. She loses things often. Client feels inadequate. Her life feels chaotic and out of control and routine tasks feel impossible to tackle. She avoids projects that require sustained mental effort. She feels she is unable to reach goals. She thinks that she has potential and ideas and creativity but \"I can't get from A to B because I get trapped and don't know where to start or what to do. I feel paralyzed and overwhelmed and I don't know what to do.\" Client is disorganized. She likes things to look \"pretty and organized\" but her paperwork and important documents are not organized. She has clothes lying around. She has picture frames sitting around that she hasn't gotten around to hanging up. Her desk is messy. Her closet has piles of stuff in it and she doesn't know what to do with it, so she leaves it there. Client has difficulty remembering things. She uses reminders on " "her phone to stay on top of things. She has a wall calendar that she jots notes on. She knows when she has appointments and will attend those. Client has difficulty listening in conversations. She will \"zone out\" at times. Client has had to ask people to repeat themselves (\"can you say that again?\" because she is thinking of something else). Client also has a tendency to repeat herself at times (her family will tell her that she already told them something). She can talk excessively (her  has told her that she talks \"at\" the kids too much). She can lie in bed and read her book and relax. Client stated that symptoms have resulted in the following functional impairments: academic performance, home life with family, management of the household and or completion of tasks, and work / vocational responsibilities. Client reported that she has not completed a previous ADHD diagnostic assessment. Client has not received a previous diagnosis of ADHD. Client reported that medication has not been prescribed medication to address these problems. Client reported that these problems began in childhood. As she has gotten older, she has realized that she has always been this way but she has learned how to cope with these things. She has been resistant to medication, but she is becoming more open to medication as a potential treatment option at this point in her life. Client explained that, twenty years ago, Client's older sister is a physician and attended a conference about ADHD with their mother and her mother commented \"Oh wow, that sounds like Joyce.\" Client has not attempted to resolve these concerns in the past. She has been reading a book called \"Women with ADHD\" (her sister gifted her this for Belle). Client reported the following previous diagnoses which includes: an Anxiety Disorder. Client started seeing a therapist when her younger daughter was 3 and this therapist mentioned \"OCD\" but she is not sure if she " "had an official diagnosis (\"constant negative thoughts and worrying that something bad was going to happen\"). Client reported symptoms began in adulthood. Client doesn't remember feeling anxious as a child at all. She thinks she has become much more aware of her anxiety as she has gotten older and had children. This was an issue she started addressing in her late 20s or early 30s. Client has received mental health services in the past: counseling. Client first did therapy in the early s (in her 20s or 30s). She has been resistant to medications. Client was last in individual therapy before . She and her  are currently in couples counseling. Psychiatric Hospitalizations: None. Client denies a history of civil commitment. Client is receiving other mental health services. These include couples therapy with . She did not report a personal history of chemical dependence.    Client reported they grew up in [place] [facility] [place]. They were raised by biological parents. Parents were always together. She was the third born of three children. Client reported that their childhood was \"great.\" She had a happy childhood and felt stable and loved. There was some academic pressure and not a lot of discussion about feelings and emotions. Her father was \"super anxious\" and she didn't realize this until she was an adult. Client described their current relationships with family of origin as good with sisters. She has been closer with her older sister. They are all in touch and get along well. Her parents are both . Her father had health issues and  \"of old age\" in his home. Her mother  in  at home and they were all together. Her rey had deteriorated. Client reported the following relationship history: one marriage. Client's current relationship status is  for 20 years. They get along well. They are in couples therapy together. Client identified their sexual orientation as heterosexual. Client " "reported having two children; daughters ages 12 and 15. Client identified partner; siblings; friends as part of their support system. Client identified the quality of these relationships as inconsistent.    As a child, client reported that she failed to complete assigned chores in the home environment, had problems getting ready for school in the morning, had problems with organization and keeping track of items, and forgot schoolwork or other items between home and school. Client reported no difficulty with childhood peer relationships. As a child, client reported having regular and consistent sleep patterns. Client reported currently experiencing sleep disturbance, including: insomnia. Sometimes it can be hard to sleep. She often wakes up during the night. Client reported sleeping approximately 6-9 hours per night. Client reported that she has not completed a sleep study.      Client's highest education level was college graduate. Client graduated high school in 1991. She estimated she obtained mostly Bs. During the elementary, middle, and high school years, Client recalls academic strengths in the area of art. Client reported experiencing academic problems in math. Client did identify the following learning problems: attention and concentration. Client did not receive tutoring services during the school years. Client did not receive special education services. Client reported failure to finish or complete homework. Client had a hard time getting work done. She disliked reading and \"did the bare minimum of homework.\" She struggled with focusing and getting assignments done. Client would get things done at the last minute. She procrastinated and \"skimmed and skipped\" assignments. If she actually tried, she would have been a better student. Client did not have attendance issues. Client would daydream during class. It was hard to pay attention. She often felt bored listening to the teacher. She thinks her parents " "were disappointed by her grades (she was told she had potential and was smart and she just needed to work harder). Both of her older sisters were high achievers and she didn't feel she could do as well as them. Client was a well-behaved child but had difficulty getting things done. Client did attend post-secondary school. She graduated from college in 1996 from [employer] in [place]) with a degree in literature. She estimated she obtained mostly Bs.  She stated, \"I didn't do well. I had freedom all of the sudden and it was so different from the really strict school I had gone to previously, so I skipped class a lot and didn't care. Everything was done at the last minute.\" She couldn't \"get it together\" to do the work and she thinks she could have been a better student. When she was in class, she couldn't concentrate. She wasn't sleeping very well and felt tired often. She fell asleep in an early Bulgarian class a few times. She was prioritizing her social life. It was important to her to feel connected socially (she thinks she drank too much to deal with social discomfort). She stated, \"It was hard for me to apply myself with things that didn't interest me or have an immediate pay off.\" Client studied [employer] design (took classes for credit for a year) - she applied to the associate degree program and completed one year before moving with her  to [place]. She did not finish this program.     Client reported that she is currently employed part-time. 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 She has been with this company for 2.5 years. Client reported that the current job is a good fit for her skills and personality. Client reported that she been frequently late for work, frequently made mistakes with poor attention to detail, often felt bored, often " "been late in completing projects, disorganized behavior, and distractible behavior. She often rushes to finish things. She feels generally disorganized. She is a reliable person, and she feels she has learned how to cope. She doesn't want to disappoint people so she does her best. She is a good employee, but she feels the way that she gets things done is a \"scramble.\" It feels stressful to get things done. She can get interrupted and distracted by things that are more interesting. It can be difficult to prioritize tasks and manage her time. 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 Client went to work for [employer] and left this job to go back to school to study interior design in her early 30s and did very well there \"because I was interested in the work and applied myself.\" She worked for an  and a textile company. Her  was offered a post-doc position at the [employer] and then they moved. 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 The longest period of employment has been 8 years (company she started working for in college). Client has not been terminated from a place of employment.     Results of testing were indicative of ADHD.?Client is reporting symptoms of inattention that have been present since childhood. The collateral reports (spouse and sister) were commensurate and indicated current symptoms of ADHD. Deficits in executive functioning were reported to be severe. Impairment in functioning is reported to be borderline. Client noted experiencing difficulties in multiple domains (e.g., school, work, home). Self-report measures indicate Client is reporting mild anxiety and mild depression at this time. Personality testing was positive for problems with attention and concentration, anxiety, and stress.    Based on the results of clinical " interview and psychological testing, the client currently meets criteria for diagnoses of ADHD Predominantly Inattentive Presentation; Generalized Anxiety Disorder; and Major Depressive Disorder, Recurrent, Mild. Client will be provided with the results of testing, diagnosis, and recommendations in her last appointment.      DSM5 Diagnoses: (Sustained by DSM5 Criteria Listed Above)      ADHD Predominantly Inattentive Presentation (F90.0)     Generalized Anxiety Disorder (F41.1)    Major Depressive Disorder, Recurrent, Mild (F33.0)     Psychosocial & Contextual Factors: work stress; in couples therapy with      Recommendations:      1. Schedule a medication evaluation with your physician. Medications are often beneficial in treating depression and anxiety symptoms as well as ADHD. It will be important that each condition is treated, as treatment for one without the other may lead to an increase in symptoms (e.g., treating ADHD, but not anxiety, can lead to increased anxiety).?   ??   2. Access resources through websites, books, and articles such as those provided in the Adult ADHD Symptom Management handout. ??   ??   3. Consider working with an ADHD  or individual therapist to learn skills to?assist with symptom management, as well as ways to improve relationships, etc. that may have been impacted by your symptoms.?   ??   4. Individual therapy is recommended. Therapies focused on identifying and challenging problematic thought and behavior patterns while increasing the use of healthy coping skills has been found to be effective in treating depression and anxiety. It will be important to set goals in this therapy and work actively toward achieving short-term successes that lead to the completion of each goal. Action-oriented therapies, such as CBT and ACT (Acceptance and Commitment Therapy) are particularly recommended for the treatment of chronic depression and anxiety.   ?   5. The use of behavioral  strategies such as diaphragmatic breathing, guided imagery, and mindfulness is often helpful in the management of anxiety symptoms.      6. ?The following compensatory strategies may be useful to cope with reported inattention symptoms:    a. Maintaining a predictable routine and structured environment that incorporates prioritized checklists and reminders (e.g., Post-Its).   b. When completing tasks, try to focus on one task at a time and complete it in its entirety before moving on to the next task.   c. Minimize background distractions when working on complex tasks. For example, TV, radio or ongoing conversations in the background may hinder ability to focus on the task at hand.   d. Take regular breaks from tasks that require prolonged attention. In general, regular breaks from complex tasks can help prevent lapses in attention, which can result in errors.   e. Outline the steps required to complete a task prior to beginning it, which can help ensure an organized approach. Use the outline to refer to throughout the task as a reminder of the steps to be completed.     Maria De Jesus Vieyra, PhD, LP   Licensed Psychologist

## 2024-03-28 ENCOUNTER — VIRTUAL VISIT (OUTPATIENT)
Dept: PSYCHOLOGY | Facility: CLINIC | Age: 49
End: 2024-03-28
Payer: COMMERCIAL

## 2024-03-28 DIAGNOSIS — F41.1 GENERALIZED ANXIETY DISORDER: ICD-10-CM

## 2024-03-28 DIAGNOSIS — F33.0 MAJOR DEPRESSIVE DISORDER, RECURRENT EPISODE, MILD (H): Primary | ICD-10-CM

## 2024-03-28 DIAGNOSIS — F90.0 ATTENTION DEFICIT HYPERACTIVITY DISORDER, INATTENTIVE TYPE: ICD-10-CM

## 2024-03-28 PROCEDURE — 96131 PSYCL TST EVAL PHYS/QHP EA: CPT | Mod: 95 | Performed by: PSYCHOLOGIST

## 2024-03-28 PROCEDURE — 96130 PSYCL TST EVAL PHYS/QHP 1ST: CPT | Mod: 95 | Performed by: PSYCHOLOGIST

## 2024-03-28 NOTE — PROGRESS NOTES
Client Name: Joyce Murry   Date: 3/28/24  ?   Service Type: Individual (ADHD Evaluation feedback session)?   ??   Session Start Time: 8:00  Session End Time: 8:20??   ??   Session Length: 20 minutes ?   ??   Session #: (feedback)?   ??   Attendees: Client attended alone?      Telemedicine Visit: The patient's condition can be safely assessed and treated via synchronous audio and visual telemedicine encounter.      Reason for Telemedicine Visit: Services only offered telehealth    Originating Site (Patient Location): Patient's home      Distant Location (provider location):  Off-site    Consent:  The patient/guardian has verbally consented to: the potential risks and benefits of telemedicine (video visit) versus in person care; bill my insurance or make self-payment for services provided; and responsibility for payment of non-covered services.     Mode of Communication:  Video Conference via griddig    As the provider I attest to compliance with applicable laws and regulations related to telemedicine.    ??   DATA?   ??   ??   Treatment Objective(s) Addressed in This Session:??   Provided feedback on ADHD evaluation. Reviewed test results in depth and answered client's questions. Client diagnosed with ADHD Predominantly Inattentive Presentation; Generalized Anxiety Disorder; and Major Depressive Disorder, Recurrent, Mild. Reviewed ADHD Coping Skills Handout. This provider also completed full written report of evaluation, including integration of testing data, summary, and recommendations. Please see Documentation Only dated 3/27/24.?   ??   Progress on / Status of Treatment Objective(s) / Homework:??   Completed??   ??   Intervention:?   ADHD Evaluation feedback; Reviewed report (can be found in Documentation Only encounter dated 3/27/24); Client was appreciative of the feedback and expressed understanding of the diagnoses. ?   ??   ??   ASSESSMENT: Current Emotional / Mental Status (status of significant  symptoms):?   Risk status (Self / Other harm or suicidal ideation)?   Client denies current fears or concerns for personal safety.?   Client denies current or recent suicidal ideation or behaviors.?   Client denies current or recent homicidal ideation or behaviors.?   Client denies current or recent self-injurious behavior or ideation.?   Client denies other safety concerns.?   A safety and risk management plan has not been developed at this time, however client was given the after-hours number / 911 should there be a change in any of these risk factors.?   ??   Appearance: Appropriate   Eye Contact: Good   Psychomotor Behavior: Normal   Attitude: Cooperative??   Orientation: All?   Speech?   Rate / Production: Normal??   Volume: Normal??   Mood: Normal?   Affect: Appropriate??   Thought Content: Clear??   Thought Form: Coherent Logical??   Insight: Good??   ??   Medication Review:?   Client is not currently prescribed psychiatric medications     Medication Compliance:?   NA  ??   Changes in Health Issues:?   None reported?   ??   Chemical Use Review:?   Substance Use: Chemical use reviewed, no active concerns identified   ??   Tobacco Use: No current tobacco use.??   ??   Collateral Reports Completed:?   Routed note to Care Team Member(s)?   ??   PLAN: (Homework, other)?   ??   Recommendations:?     1. Schedule a medication evaluation with your physician. Medications are often beneficial in treating depression and anxiety symptoms as well as ADHD. It will be important that each condition is treated, as treatment for one without the other may lead to an increase in symptoms (e.g., treating ADHD, but not anxiety, can lead to increased anxiety).?   ??   2. Access resources through websites, books, and articles such as those provided in the Adult ADHD Symptom Management handout. ??   ??   3. Consider working with an ADHD  or individual therapist to learn skills to?assist with symptom management, as well as ways to  improve relationships, etc. that may have been impacted by your symptoms.?   ??   4. Individual therapy is recommended. Therapies focused on identifying and challenging problematic thought and behavior patterns while increasing the use of healthy coping skills has been found to be effective in treating depression and anxiety. It will be important to set goals in this therapy and work actively toward achieving short-term successes that lead to the completion of each goal. Action-oriented therapies, such as CBT and ACT (Acceptance and Commitment Therapy) are particularly recommended for the treatment of chronic depression and anxiety.   ?   5. The use of behavioral strategies such as diaphragmatic breathing, guided imagery, and mindfulness is often helpful in the management of anxiety symptoms.      6. ?The following compensatory strategies may be useful to cope with reported inattention symptoms:    a. Maintaining a predictable routine and structured environment that incorporates prioritized checklists and reminders (e.g., Post-Its).   b. When completing tasks, try to focus on one task at a time and complete it in its entirety before moving on to the next task.   c. Minimize background distractions when working on complex tasks. For example, TV, radio or ongoing conversations in the background may hinder ability to focus on the task at hand.   d. Take regular breaks from tasks that require prolonged attention. In general, regular breaks from complex tasks can help prevent lapses in attention, which can result in errors.   e. Outline the steps required to complete a task prior to beginning it, which can help ensure an organized approach. Use the outline to refer to throughout the task as a reminder of the steps to be completed.      Maria De Jesus Vieyra, PhD, LP   Licensed Psychologist        Psychological Testing    Billing/Services Summary    ?    Testing Evaluation Services  Base: 43378   (1st 60 mins)  Add-on: 07338    (each addtl 60 mins)    Record Review and Clarify Referral Question    (8:20/8:40), (3/6/24)  20 minutes    Integration/Report Generation  (11:00/12:00), (3/6/24) - MMPI-3  (3:00/4:00), (3/22/24) - Sukumar Scales  (1:00/2:00), (3/27/24) - Report Writing 60 minutes  60 minutes  60 minutes     Interactive Feedback Session   (8:00/8:20), (3/28/24) 20 minutes    Total Time:  220 minutes (3 hours, 40 minutes)    Total Units:  1  3   Diagnosis(es): (ICD-10)   ADHD Predominantly Inattentive Presentation (F90.0)  Generalized Anxiety Disorder (F41.1)  Major Depressive Disorder, Recurrent, Mild (F33.0)

## 2024-04-02 ENCOUNTER — TELEPHONE (OUTPATIENT)
Dept: INTERNAL MEDICINE | Facility: CLINIC | Age: 49
End: 2024-04-02

## 2024-04-02 ENCOUNTER — VIRTUAL VISIT (OUTPATIENT)
Dept: INTERNAL MEDICINE | Facility: CLINIC | Age: 49
End: 2024-04-02
Payer: COMMERCIAL

## 2024-04-02 DIAGNOSIS — F90.0 ATTENTION DEFICIT HYPERACTIVITY DISORDER (ADHD), PREDOMINANTLY INATTENTIVE TYPE: Primary | ICD-10-CM

## 2024-04-02 DIAGNOSIS — G44.219 EPISODIC TENSION-TYPE HEADACHE, NOT INTRACTABLE: ICD-10-CM

## 2024-04-02 DIAGNOSIS — F41.1 GENERALIZED ANXIETY DISORDER: ICD-10-CM

## 2024-04-02 PROCEDURE — 99215 OFFICE O/P EST HI 40 MIN: CPT | Mod: 95 | Performed by: INTERNAL MEDICINE

## 2024-04-02 RX ORDER — DEXTROAMPHETAMINE SACCHARATE, AMPHETAMINE ASPARTATE MONOHYDRATE, DEXTROAMPHETAMINE SULFATE AND AMPHETAMINE SULFATE 2.5; 2.5; 2.5; 2.5 MG/1; MG/1; MG/1; MG/1
10 CAPSULE, EXTENDED RELEASE ORAL EVERY MORNING
Qty: 14 CAPSULE | Refills: 0 | Status: SHIPPED | OUTPATIENT
Start: 2024-04-02

## 2024-04-02 NOTE — PROGRESS NOTES
Joyce is a 48 year old who is being evaluated via a billable video visit.    How would you like to obtain your AVS? MyChart  If the video visit is dropped, the invitation should be resent by: Send to e-mail at: [email or web address]  Will anyone else be joining your video visit? No      Assessment & Plan     Attention deficit hyperactivity disorder (ADHD), predominantly inattentive type  Generalized anxiety disorder  Discussion with patient today about treatment options. She has never been on treatment for anxiety but this had been offered in the past. I explained overlap between anxiety symptoms and ADD and how untreated anxiety can cause many of the same symptoms of ADD including lack of concentration, distractibility, inability to carry through on tasks, disorganization, irritability. I think a significant amount of the symptoms in her life which are currently leading to relationship and quality of life issues are related to anxiety. Advised treating both, preferably anxiety first. She has a preference to trial a stimulant to see how much this helps in the short term. Discussed possible side effects of stimulants.  Okay with trial treatment period though would revisit selective serotonin reuptake inhibitor if symptoms are not adequately controlled.  - amphetamine-dextroamphetamine (ADDERALL XR) 10 MG 24 hr capsule; Take 1 capsule (10 mg) by mouth every morning    Episodic tension-type headache, not intractable  May not need OCP for HA control, as HA do not sound like menstrual or hormonal HA. Advised HA journal. Okay to use nsaids. Not certain these are migraines but advised further follow-up with myself of HA specialist.  - Adult Neurology  Referral; Future      I spent a total of 51 minutes on the day of the visit.   Time spent by me doing chart review, history and exam, documentation and further activities per the note            Return in about 14 weeks (around 7/9/2024) for with me, using a video  "visit.    Subjective   Joyce is a 48 year old, presenting for the following health issues:  RECHECK and Headaches, testing for ADHD and genetic testing    History of Present Illness       Headaches:   Since the patient's last clinic visit, headaches are: no change  The patient is getting headaches:  A few days a month  She is able to do normal daily activities when she has a migraine.  The patient is taking the following rescue/relief medications:  Ibuprofen (Advil, Motrin)   Patient states \"I get some relief\" from the rescue/relief medications.   The patient is taking the following medications to prevent migraines:  No medications to prevent migraines  In the past 4 weeks, the patient has gone to an Urgent Care or Emergency Room 0 times times due to headaches.    Reason for visit:  Follow up with wellness check. Updates on testing since last meeting ADHD and Genetic testing    She eats 2-3 servings of fruits and vegetables daily.She exercises with enough effort to increase her heart rate 10 to 19 minutes per day.    She is taking medications regularly.     Gets HAs 2-3 times per month, bilat, along frontal region or eyes, typically responds to APAP or advil, no vomiting, mild nausea, maybe some phonophobia/photophobia, usually late afternoon. Was initially started on OCP for this but not sure what impact it's had. She doesn't take placebos so does not menstruate but she is perimenopausal.    She did complete genetic testing given family hx of breast cancer, no genetic mutations identified, but she did have elevated lifetime risk for breast cancer and recommended annual breast MRIs in addition to mammograms.    Joyce was diagnosed with ADD inattentive type recently, as well as MDD and GABRIEL.  Has an appt for therapy. Daughter was diagnosed with ADD and taking vyvanse. She sleeps okay, sometimes wakes up with worry about children. Feels irritable, crazy, lack of clarity, control issues, worrying about lots of tasks to " get done. Has never taking medication for anxiety.        5/24/2018    11:06 AM 10/30/2019    10:07 AM 3/5/2024     7:58 PM   PHQ   PHQ-9 Total Score 5 12 8   Q9: Thoughts of better off dead/self-harm past 2 weeks Not at all Not at all Not at all          5/24/2018    11:06 AM 10/30/2019    10:07 AM 3/5/2024     8:11 PM   GABRIEL-7 SCORE   Total Score   12 (moderate anxiety)   Total Score 9 13 12                      Objective           Vitals:  No vitals were obtained today due to virtual visit.    Physical Exam   GENERAL: alert and no distress  EYES: Eyes grossly normal to inspection.  No discharge or erythema, or obvious scleral/conjunctival abnormalities.  RESP: No audible wheeze, cough, or visible cyanosis.    SKIN: Visible skin clear. No significant rash, abnormal pigmentation or lesions.  NEURO: Cranial nerves grossly intact.  Mentation and speech appropriate for age.  PSYCH: Appropriate affect, tone, and pace of words          Video-Visit Details    Type of service:  Video Visit   Originating Location (pt. Location): Home    Distant Location (provider location):  On-site  Platform used for Video Visit: Philipp  Signed Electronically by: Odalis Pearson MD

## 2024-04-02 NOTE — TELEPHONE ENCOUNTER
Left Voicemail (1st Attempt) and Sent Mychart (1st Attempt) for the patient to call back and schedule the following:    Appointment type: VIDEO VISIT RETURN  Provider: PCP  Return date: 7/9/2024

## 2024-04-02 NOTE — PATIENT INSTRUCTIONS
If you don't hear from a representative within 2 business days, please call [phone].         Thank you for visiting the Primary [facility] Center today at the [facility]! The following is some information about our clinic:     Primary Care Center Frequently-Asked Questions    (1) How do I schedule appointments at the [facility]?     Primary Care--to schedule or make changes to an existing appointment, please call our primary care line at [phone].    Labs--to schedule a lab appointment at the [facility] you can use [facility] or call [phone]. If you have a [facility] location that is closer to home, you can reach out to that location for scheduling options.     Imaging--if you need to schedule a CT, X-ray, MRI, ultrasound, or other imaging study you can call [phone] to schedule at the [facility] or any other [facility] imaging location.     Referrals--if a referral to another specialty was ordered you can expect a phone call from their scheduling team. If you have not heard from them in a week, please call us or send us a [facility] message to check the status or get a scheduling number. Please note that this only applies to internal [facility] referrals. If the referral is external you would need to contact their office for scheduling.     (2) I have a question about my visit, who do I contact?     You can call us at the primary care line at [phone] to ask questions about your visit. You can also send a secure message through [facility], which is reviewed by clinic staff. Please note that [facility] messages have a twenty-four to forty-eight business hour turnaround time and should not be used for urgent concerns.    (3) How will I get the results of my tests?    If you are signed up for [facility] all tests will be released to you within twenty-four hours of resulting. Please allow three to five days for your doctor to review your  results and place a note interpreting the results. If you do not have [facility] you will receive your results through mail seven to ten business days following the return of the tests. Please note that if there should be any urgent or concerning results that your doctor or their registered nurse will reach out to you the same day as the tests come back. If you have follow up questions about your results or would like to discuss the results in detail please schedule a follow up with your provider either in person or virtually.     (4) How do I get refills of my prescriptions?     You should always first contact your pharmacy for refills of your medications. If submitting a refill request on [employer], please be sure to submit the request only once--repeat requests can cause delays in refill. If you are requesting a NEW medication or a medication related to new symptoms you will need to schedule an appointment with a provider prior to approval. Please note: Routine medication refills have up to one to three business day turnaround whereas controlled substances refills have up to five to seven business day turnaround.    (5) I have new symptoms, what do I do?     If you are having an immediate medical emergency, you should dial 911 for assistance.   For anything urgent that needs to be seen within a few hours to one day you should visit a local urgent care for assistance.  For non-urgent symptoms that need to be seen within a few days to a week you can schedule with an available provider in primary care by going to [employer] or calling [phone].   If you are not sure how serious your symptoms are or you would like to receive medical advice you can always call [phone] to speak with a triage nurse.

## 2024-04-17 ENCOUNTER — VIRTUAL VISIT (OUTPATIENT)
Dept: INTERNAL MEDICINE | Facility: CLINIC | Age: 49
End: 2024-04-17
Payer: COMMERCIAL

## 2024-04-17 DIAGNOSIS — F98.8 ADD (ATTENTION DEFICIT DISORDER) WITHOUT HYPERACTIVITY: Primary | ICD-10-CM

## 2024-04-17 PROCEDURE — 99213 OFFICE O/P EST LOW 20 MIN: CPT | Mod: 95 | Performed by: INTERNAL MEDICINE

## 2024-04-17 RX ORDER — DEXTROAMPHETAMINE SACCHARATE, AMPHETAMINE ASPARTATE MONOHYDRATE, DEXTROAMPHETAMINE SULFATE AND AMPHETAMINE SULFATE 3.75; 3.75; 3.75; 3.75 MG/1; MG/1; MG/1; MG/1
15 CAPSULE, EXTENDED RELEASE ORAL DAILY
Qty: 30 CAPSULE | Refills: 0 | Status: SHIPPED | OUTPATIENT
Start: 2024-04-17 | End: 2024-05-15

## 2024-04-17 ASSESSMENT — PATIENT HEALTH QUESTIONNAIRE - PHQ9: SUM OF ALL RESPONSES TO PHQ QUESTIONS 1-9: 0

## 2024-04-17 NOTE — PROGRESS NOTES
"Joyce is a 48 year old who is being evaluated via a billable video visit.    How would you like to obtain your AVS? MyChart  If the video visit is dropped, the invitation should be resent by: Send to e-mail at: [email or web address]  Will anyone else be joining your video visit? No      Assessment & Plan     ADD (attention deficit disorder) without hyperactivity  Joyce has had a very positive effect from adderall thus far without any notable adverse impacts. I advised her to monitor for weight loss, palpitations and insomnia. Will bump up dose slightly to 15 mg from 10 mg given slight \"wearing off\" effect in PMs.  She will message me via the portal prior to her next refill with an update. Advised of controlled substance refill policies and need to request refills proactively 5 business days in advance.  - amphetamine-dextroamphetamine (ADDERALL XR) 15 MG 24 hr capsule; Take 1 capsule (15 mg) by mouth daily                  No follow-ups on file.    Subjective   Joyce is a 48 year old, presenting for the following health issues:  RECHECK and Recheck Medication    History of Present Illness       Reason for visit:  ADHD meds follow up    She eats 2-3 servings of fruits and vegetables daily.She consumes 0 sweetened beverage(s) daily.She exercises with enough effort to increase her heart rate 9 or less minutes per day.  She exercises with enough effort to increase her heart rate 5 days per week. She is missing 1 dose(s) of medications per week.  She is not taking prescribed medications regularly due to other.     Patient states it's been going well, the last couple weeks since started low dose adderall 10 mg XL.  Has better mood, less irritability, less chatter in brain, less negative thoughts, less distracted. Better able to do task oriented activities. Not a lot of spiraling. Better emotional regulation. No reports of sleep issues.    First couple days felt a little jittery but settled out.  She denies negative impacts on " appetite. No heart racing.  She takes around 8-9 am. Would plan to take most days.  No issues at pharmacy  Sister and  have noted improvement.    Last visit:  Gets HAs 2-3 times per month, bilat, along frontal region or eyes, typically responds to APAP or advil, no vomiting, mild nausea, maybe some phonophobia/photophobia, usually late afternoon. Was initially started on OCP for this but not sure what impact it's had. She doesn't take placebos so does not menstruate but she is perimenopausal.    She did complete genetic testing given family hx of breast cancer, no genetic mutations identified, but she did have elevated lifetime risk for breast cancer and recommended annual breast MRIs in addition to mammograms.    Joyce was diagnosed with ADD inattentive type recently, as well as MDD and GABRIEL.  Has an appt for therapy. Daughter was diagnosed with ADD and taking vyvanse. She sleeps okay, sometimes wakes up with worry about children. Feels irritable, crazy, lack of clarity, control issues, worrying about lots of tasks to get done. Has never taking medication for anxiety.        5/24/2018    11:06 AM 10/30/2019    10:07 AM 3/5/2024     7:58 PM   PHQ   PHQ-9 Total Score 5 12 8   Q9: Thoughts of better off dead/self-harm past 2 weeks Not at all Not at all Not at all          5/24/2018    11:06 AM 10/30/2019    10:07 AM 3/5/2024     8:11 PM   GABRIEL-7 SCORE   Total Score   12 (moderate anxiety)   Total Score 9 13 12                            Objective    Vitals - Patient Reported  Pain Score: No Pain (0)        Physical Exam   GENERAL: alert and no distress  EYES: Eyes grossly normal to inspection.  No discharge or erythema, or obvious scleral/conjunctival abnormalities.  RESP: No audible wheeze, cough, or visible cyanosis.    SKIN: Visible skin clear. No significant rash, abnormal pigmentation or lesions.  NEURO: Cranial nerves grossly intact.  Mentation and speech appropriate for age.  PSYCH: Appropriate affect,  tone, and pace of words          Video-Visit Details    Type of service:  Video Visit   Originating Location (pt. Location): Home    Distant Location (provider location):  Off-site  Platform used for Video Visit: Philipp  Signed Electronically by: Odalis Pearson MD

## 2024-04-17 NOTE — PATIENT INSTRUCTIONS
Thank you for visiting the Primary Care Center today at the [facility]! The following is some information about our clinic:     Primary Care Center Frequently-Asked Questions    (1) How do I schedule appointments at the [facility]?     Primary Care--to schedule or make changes to an existing appointment, please call our primary care line at [phone].    Labs--to schedule a lab appointment at the [facility] you can use [employer] or call [phone]. If you have a [place] location that is closer to home, you can reach out to that location for scheduling options.     Imaging--if you need to schedule a CT, X-ray, MRI, ultrasound, or other imaging study you can call [phone] to schedule at the [facility] or any other [facility] imaging location.     Referrals--if a referral to another specialty was ordered you can expect a phone call from their scheduling team. If you have not heard from them in a week, please call us or send us a [employer] message to check the status or get a scheduling number. Please note that this only applies to internal [facility] referrals. If the referral is external you would need to contact their office for scheduling.     (2) I have a question about my visit, who do I contact?     You can call us at the primary care line at [phone] to ask questions about your visit. You can also send a secure message through [employer], which is reviewed by clinic staff. Please note that [employer] messages have a twenty-four to forty-eight business hour turnaround time and should not be used for urgent concerns.    (3) How will I get the results of my tests?    If you are signed up for [employer] all tests will be released to you within twenty-four hours of resulting. Please allow three to five days for your doctor to review your results and place a note interpreting the results. If you do not have [employer] you will receive your  results through mail seven to ten business days following the return of the tests. Please note that if there should be any urgent or concerning results that your doctor or their registered nurse will reach out to you the same day as the tests come back. If you have follow up questions about your results or would like to discuss the results in detail please schedule a follow up with your provider either in person or virtually.     (4) How do I get refills of my prescriptions?     You should always first contact your pharmacy for refills of your medications. If submitting a refill request on [employer], please be sure to submit the request only once--repeat requests can cause delays in refill. If you are requesting a NEW medication or a medication related to new symptoms you will need to schedule an appointment with a provider prior to approval. Please note: Routine medication refills have up to one to three business day turnaround whereas controlled substances refills have up to five to seven business day turnaround.    (5) I have new symptoms, what do I do?     If you are having an immediate medical emergency, you should dial 911 for assistance.   For anything urgent that needs to be seen within a few hours to one day you should visit a local urgent care for assistance.  For non-urgent symptoms that need to be seen within a few days to a week you can schedule with an available provider in primary care by going to [employer] or calling [phone].   If you are not sure how serious your symptoms are or you would like to receive medical advice you can always call [phone] to speak with a triage nurse.

## 2024-05-15 ENCOUNTER — MYC REFILL (OUTPATIENT)
Dept: INTERNAL MEDICINE | Facility: CLINIC | Age: 49
End: 2024-05-15
Payer: COMMERCIAL

## 2024-05-15 DIAGNOSIS — F98.8 ADD (ATTENTION DEFICIT DISORDER) WITHOUT HYPERACTIVITY: ICD-10-CM

## 2024-05-15 RX ORDER — DEXTROAMPHETAMINE SACCHARATE, AMPHETAMINE ASPARTATE MONOHYDRATE, DEXTROAMPHETAMINE SULFATE AND AMPHETAMINE SULFATE 3.75; 3.75; 3.75; 3.75 MG/1; MG/1; MG/1; MG/1
15 CAPSULE, EXTENDED RELEASE ORAL DAILY
Qty: 30 CAPSULE | Refills: 0 | Status: SHIPPED | OUTPATIENT
Start: 2024-05-17 | End: 2024-06-13

## 2024-05-15 NOTE — TELEPHONE ENCOUNTER
[place]  Data    Adderall ER 15 mg refill request. LOV 4/17/2024.  Last filled - 30 cap - 30 day supply - on 4/17/2024.  Pended Rx - 30 cap - 30 day supply - fill date on or after 5/17/2024.  Rx request forwarded to provider for review.  Akiko HILL LPN  [facility]

## 2024-06-13 ENCOUNTER — MYC REFILL (OUTPATIENT)
Dept: INTERNAL MEDICINE | Facility: CLINIC | Age: 49
End: 2024-06-13
Payer: COMMERCIAL

## 2024-06-13 DIAGNOSIS — F98.8 ADD (ATTENTION DEFICIT DISORDER) WITHOUT HYPERACTIVITY: ICD-10-CM

## 2024-06-14 NOTE — TELEPHONE ENCOUNTER
Patient calling checking the status on this stating she'll be out of town as of tomorrow for a week and would like this refilled today if possible and if not, can she get it sent to a pharmacy to where she's going. PHarmacy is:   [facility] [address] [address]  Phone number: [phone]  Please call her to let her know where this prescription was sent to.

## 2024-06-18 RX ORDER — DEXTROAMPHETAMINE SACCHARATE, AMPHETAMINE ASPARTATE MONOHYDRATE, DEXTROAMPHETAMINE SULFATE AND AMPHETAMINE SULFATE 3.75; 3.75; 3.75; 3.75 MG/1; MG/1; MG/1; MG/1
15 CAPSULE, EXTENDED RELEASE ORAL DAILY
Qty: 30 CAPSULE | Refills: 0 | Status: SHIPPED | OUTPATIENT
Start: 2024-06-18 | End: 2024-07-18

## 2024-07-18 ENCOUNTER — MYC REFILL (OUTPATIENT)
Dept: INTERNAL MEDICINE | Facility: CLINIC | Age: 49
End: 2024-07-18
Payer: COMMERCIAL

## 2024-07-18 DIAGNOSIS — F98.8 ADD (ATTENTION DEFICIT DISORDER) WITHOUT HYPERACTIVITY: ICD-10-CM

## 2024-07-23 ENCOUNTER — MYC REFILL (OUTPATIENT)
Dept: INTERNAL MEDICINE | Facility: CLINIC | Age: 49
End: 2024-07-23
Payer: COMMERCIAL

## 2024-07-23 DIAGNOSIS — F98.8 ADD (ATTENTION DEFICIT DISORDER) WITHOUT HYPERACTIVITY: ICD-10-CM

## 2024-07-23 RX ORDER — DEXTROAMPHETAMINE SACCHARATE, AMPHETAMINE ASPARTATE MONOHYDRATE, DEXTROAMPHETAMINE SULFATE AND AMPHETAMINE SULFATE 3.75; 3.75; 3.75; 3.75 MG/1; MG/1; MG/1; MG/1
15 CAPSULE, EXTENDED RELEASE ORAL DAILY
Qty: 30 CAPSULE | Refills: 0 | Status: CANCELLED | OUTPATIENT
Start: 2024-07-23

## 2024-07-24 RX ORDER — DEXTROAMPHETAMINE SACCHARATE, AMPHETAMINE ASPARTATE MONOHYDRATE, DEXTROAMPHETAMINE SULFATE AND AMPHETAMINE SULFATE 3.75; 3.75; 3.75; 3.75 MG/1; MG/1; MG/1; MG/1
15 CAPSULE, EXTENDED RELEASE ORAL DAILY
Qty: 30 CAPSULE | Refills: 0 | Status: SHIPPED | OUTPATIENT
Start: 2024-07-24 | End: 2024-08-20

## 2024-08-20 ENCOUNTER — MYC REFILL (OUTPATIENT)
Dept: INTERNAL MEDICINE | Facility: CLINIC | Age: 49
End: 2024-08-20
Payer: COMMERCIAL

## 2024-08-20 DIAGNOSIS — F98.8 ADD (ATTENTION DEFICIT DISORDER) WITHOUT HYPERACTIVITY: ICD-10-CM

## 2024-08-27 RX ORDER — DEXTROAMPHETAMINE SACCHARATE, AMPHETAMINE ASPARTATE MONOHYDRATE, DEXTROAMPHETAMINE SULFATE AND AMPHETAMINE SULFATE 3.75; 3.75; 3.75; 3.75 MG/1; MG/1; MG/1; MG/1
15 CAPSULE, EXTENDED RELEASE ORAL DAILY
Qty: 30 CAPSULE | Refills: 0 | Status: SHIPPED | OUTPATIENT
Start: 2024-08-27 | End: 2024-09-27

## 2024-09-19 ENCOUNTER — TRANSFERRED RECORDS (OUTPATIENT)
Dept: HEALTH INFORMATION MANAGEMENT | Facility: CLINIC | Age: 49
End: 2024-09-19
Payer: COMMERCIAL

## 2024-09-25 NOTE — PROGRESS NOTES
"Oncology Risk Management Consultation:  Date on this visit: 10/1/2024    Joyce Murry returns to the Cancer Risk Management Program for an oncology risk management consultation. She requires high risk screening and surveillance to reduce her risk of cancer secondary to having a family history of breast cancer in her mother at age 50, paternal aunt, paternal cousin and paternal grandmother. She is considered to be at high risk for breast cancer and has a 20.4% lifetime risk for breast cancer by the [employer]ELAND model.      Primary Physician:  Odalis Pearson MD     History Of Present Illness:  Ms. Murry is a 48 year old female who presents with family history of breast cancer.     Pertinent history:  Menarche at: 13-14  First child at: 33  Menopausal status: Premenopausal  Ovaries, fallopian tubes and uterus intact  Breast Density: scattered areas of fibroglandular density   Hx of OCPs: 1 year  Hx of HRT: none  Hx of breast biopsies: one in , fibroadenoma  Hx of atypia or malignancy.  Diet: fairly balanced, low in processed foods  Alcohol:  about 1 drink per week or less.  Smoking:  former tobacco use from age [age]  Other health habits: exercises daily, lifts weights each morning, walks outside when weather permits    Genetic testin23. The CancerNext Panel was ordered from [employer]. This testing was done because of Joyce's family history of cancer.     Genetic Testing Result: NEGATIVE  Joyce is negative for mutations in the 36 genes analyzed: APC, TANVI, BARD1, BMPR1A, BRCA1, BRCA2, BRIP1, CDH1, CDK4, CDKN2A, CHEK2, DICER1, MLH1, MSH2, MSH6, MUTYH, NBN, NF1, NTHL1, PALB2, PMS2, PTEN, RAD51C, RAD51D, RECQL, SMAD4, SMARCA4, STK11 and TP53 (sequencing and deletion/duplication); AXIN2, HOXB13, MSH3, POLD1 and POLE (sequencing only); EPCAM and GREM1 (deletion/duplication only).     A copy of the test report can be found in the Laboratory tab, dated 23, and named \"LABORATORY MISCELLANEOUS ORDER\". The report " is scanned in as a linked document.      Pertinent screening history:  5/15/2014- Bilateral mammogram and left breast US: BiRads4 for 1.3-cm mass in the LEFT breast at 12 o'clock is at a low suspicion for malignancy.  Biopsy is recommended.     5/22/2014: Left breast core needle biopsy:  The pathologic findings of the LEFT breast ultrasound-guided biopsy at 12  o'clock, 4 cm from the nipple showed fibroadenoma.   5/22/2015- Screening mammogram, ACR2  5/25/2016- Screening tomosynthesis mammogram, BiRads0 for FINDINGS: RIGHT Breast: In the slightly upper breast on the MLO view, 7 cm posterior  to the nipple is a 0.9 cm. This is not well seen on the CC view; however,  tomosynthesis images place this in the lateral breast. LEFT Breast: No suspicious findings. Biopsy clips noted.   6/3/2016- Right diagnostic mammogram, BiRads1  5/30/2017- Screening tomosynthesis mammogram, BiRads1  5/31/2018- Screening tomosynthesis mammogram, BiRads1  6/3/2019- Screening tomosynthesis mammogram, BiRads1  6/16/2020- Diagnostic tomosynthesis mammogram, BiRads1  6/17/2021- Screening tomosynthesis mammogram, BiRads1  6/29/2022- Screening tomosynthesis mammogram, BiRads1  7/17/2023- Screening tomosynthesis mammogram, BiRads1  3/17/2024: Breast MRI, BiRads2    At this visit, she denies new fatigue, breast pain, asymmetry, lumps, masses, thickening, nipple discharge and skin changes in her breasts.    Past Medical/Surgical History:  Past Medical History:   Diagnosis Date    ADD (attention deficit disorder) without hyperactivity     Anxiety     Family history of malignant neoplasm of breast     genetic testing negative 12/23, high risk for breast cancer so annual MRI advised    Genital herpes     History of hyperlipidemia, improved as of 2016 with diet and exercise     Menstrual migraine      Past Surgical History:   Procedure Laterality Date    COLONOSCOPY  12/19/2017    Dr. Joyce [facility]    COLONOSCOPY N/A 12/19/2017    Procedure: COLONOSCOPY;   Colonoscopy ;  Surgeon: Leona Joyce MD;  Location:  GI    COLONOSCOPY N/A 2022    Procedure: COLONOSCOPY crsal;  Surgeon: Leona Joyce MD;  Location:  GI    D & C      HEAD & NECK SURGERY      wisdom teeth removed       Allergies:  Allergies as of 10/01/2024 - Reviewed 10/01/2024   Allergen Reaction Noted    Amoxicillin-pot clavulanate Rash 10/08/2012       Current Medications:  Current Outpatient Medications   Medication Sig Dispense Refill    amphetamine-dextroamphetamine (ADDERALL XR) 10 MG 24 hr capsule Take 1 capsule (10 mg) by mouth every morning 14 capsule 0    amphetamine-dextroamphetamine (ADDERALL XR) 15 MG 24 hr capsule Take 1 capsule (15 mg) by mouth daily. 30 capsule 0    KARINA 1.5/30 1.5-30 MG-MCG tablet Take 1 tablet by mouth daily      tretinoin (RETIN-A) 0.1 % external cream APPLY PEA SIZE AMOUNT TO FACE QHS AS TOLERATED  11        Family History:  Family History   Problem Relation Age of Onset    Breast Cancer Mother 50        second occurrence in late 50s, then in 80s with metastatic (cause of death)    Diabetes Type 1 Mother     Leukemia Mother 60 - 69        CLL    Skin Cancer Mother         arm    Hypertension Father         pulmonary HTN    Heart Disease Father 55        CAD-     Diabetes Father 65        type 2    Lung Cancer Maternal Grandfather 76        ; hx of smoking    Breast Cancer Paternal Grandmother          in late 70s    Hodgkin's lymphoma Paternal Grandfather 45         in late 40s, early 50s    Anxiety Disorder Sister     Attention Deficit Disorder Sister     Attention Deficit Disorder Daughter     Pancreatic Cancer Maternal Uncle 87         in late 80s    Breast Cancer Paternal Aunt          in 70s    Pancreatic Cancer Paternal Aunt     Alzheimer Disease Paternal Aunt 70        possibly also had breast cancer    Breast Cancer Paternal Cousin 52    Colon Cancer No family hx of        Social History:  Social History      Socioeconomic History    Marital status:      Spouse name: Not on file    Number of children: Not on file    Years of education: Not on file    Highest education level: Not on file   Occupational History    Not on file   Tobacco Use    Smoking status: Former     Current packs/day: 0.00     Types: Cigarettes     Quit date: 2002     Years since quittin.6     Passive exposure: Never    Smokeless tobacco: Never   Vaping Use    Vaping status: Never Used   Substance and Sexual Activity    Alcohol use: Yes     Comment: 1-2 drink once every 2 weeks    Drug use: Never    Sexual activity: Yes     Partners: Male   Other Topics Concern    Parent/sibling w/ CABG, MI or angioplasty before 65F 55M? 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 , designs jewelry    Tobacco-none, quit , smoked 1 ppd x 10 yrs    ETOH- rare    Exercise-  3/week-gym; body pump and kickboxing         Social Determinants of Health     Financial Resource Strain: Low Risk  (2024)    Financial Resource Strain     Within the past 12 months, have you or your family members you live with been unable to get utilities (heat, electricity) when it was really needed?: No   Food Insecurity: Low Risk  (2024)    Food Insecurity     Within the past 12 months, did you worry that your food would run out before you got money to buy more?: No     Within the past 12 months, did the food you bought just not last and you didn t have money to get more?: No   Transportation Needs: Low Risk  (2024)    Transportation Needs     Within the past 12 months, has lack of transportation kept you from medical appointments, getting your medicines, non-medical meetings or appointments, work, or from getting things that you need?: No   Physical Activity: Not on file   Stress: Not on file   Social Connections: Unknown (2021)    [facility],  [facility]    Social Connections     Frequency of Communication with Friends and Family: Not on file   Interpersonal Safety: Not on file   Housing Stability: Low Risk  (4/2/2024)    Housing Stability     Do you have housing? : Yes     Are you worried about losing your housing?: No       Physical Exam:  /75 (BP Location: Right arm, Patient Position: Sitting, Cuff Size: Adult Regular)   Pulse 72   Temp 98.2  F (36.8  C) (Oral)   Resp 16   Wt 56.7 kg (125 lb)   SpO2 100%   BMI 19.87 kg/m    GENERAL APPEARANCE: healthy, alert and no apparent distress  BREAST: A multipositional, bilateral breast exam was performed.  Fairly symmetrical, -Rsl>L. Nipples everted bilaterally. Right breast: no palpable dominant masses, no nipple discharge, no skin changes. Right axilla: no palpable adenopathy. Left breast: no palpable dominant masses, no nipple discharge, no skin changes. Left axilla: no palpable adenopathy.   LYMPHATICS: No cervical, supraclavicular, or axillary lymphadenopathy  SKIN: no suspicious lesions or rashes on examined skin    Laboratory/Imaging Studies  No results found for any visits on 10/01/24.    ASSESSMENT  Joyce reports that she is doing well at this visit. She has no concerns with her breast tissue, and no updates to her family's medical history. We discussed her last screening tests and reviewed results. We discussed concerns and symptoms to be watchful for between visits, including breast lumps, bumps, nipple inversion, nipple discharge and any changes in skin including crinkled or puckered skin. We reviewed the recommendation for breast self awareness. Her exam today was unremarkable. We will proceed with the plan below.       Individualized Surveillance Plan for women  With 20% or greater lifetime risk of breast cancer   Per NCCN Breast Cancer Screening and Diagnosis Guidelines Version 2.2024   Recommended screening Test or procedure Last done Next Scheduled     Clinical encounter Clinical exam every 6-12 months.   Refer to genetic counseling if not already done.  Consider risk reduction strategies.   September 2024 October 2025   However, some family histories with breast cancers at a very young age, may warrant screening starting earlier.    *May begin at age 40 if breast cancers in the family occur at later ages.    Annual mammogram beginning 10 years younger than the earliest breast cancer in the family but not prior to age 30.    Recommend annual breast MRI to begin 10 years younger than the earliest breast cancer in the family but not prior to age 25.    Breast MRIs are preferably done on day 7-15 of the menstrual cycle in premenopausal women.   7/17/2023- Screening tomosynthesis mammogram, BiRads1    3/17/2024: Breast MRI, BiRads2   Mammogram today    Breast MRI in April, 2025    Return to clinic in October 2025 with a mammogram following our visit   Breast screening for patients at high risk due to thoracic radiation between the ages of 10-30   Annual clinical exam beginning 8 years after radiation therapy.    Annual screening mammogram beginning at age 30 or 8 years after radiation therapy    Annual breast MRI, beginning at age 25 or 8 years after radiation therapy.     NA   NA   Women who have a lifetime risk of >20% based on history of LCIS or ADH/ALH Annual screening mammogram beginning at age of LCIS or ADH/ALH but not prior to age 30.    Consider annual MRI to begin at age of diagnosis of LCIS or ADH/ALH but not prior to age 25.    Consider risk reducing strategies.   NA   NA    Recommend risk reducing strategies for women with 1.7% 5 year risk of breast cancer.               I spent a total of 20 minutes on the day of the visit. Please see the note for further information on patient assessment and treatment.     Lita Villafuerte, DNP, APRN, AGCNS-BC  Clinical Nurse Specialist  Cancer Risk Management Program  [employer]    Cc:  Odalis Pearson MD

## 2024-09-27 ENCOUNTER — MYC REFILL (OUTPATIENT)
Dept: INTERNAL MEDICINE | Facility: CLINIC | Age: 49
End: 2024-09-27
Payer: COMMERCIAL

## 2024-09-27 DIAGNOSIS — F98.8 ADD (ATTENTION DEFICIT DISORDER) WITHOUT HYPERACTIVITY: ICD-10-CM

## 2024-10-01 ENCOUNTER — ONCOLOGY VISIT (OUTPATIENT)
Dept: ONCOLOGY | Facility: CLINIC | Age: 49
End: 2024-10-01
Payer: COMMERCIAL

## 2024-10-01 ENCOUNTER — ANCILLARY PROCEDURE (OUTPATIENT)
Dept: MAMMOGRAPHY | Facility: CLINIC | Age: 49
End: 2024-10-01
Payer: COMMERCIAL

## 2024-10-01 VITALS
SYSTOLIC BLOOD PRESSURE: 111 MMHG | DIASTOLIC BLOOD PRESSURE: 75 MMHG | WEIGHT: 125 LBS | TEMPERATURE: 98.2 F | OXYGEN SATURATION: 100 % | RESPIRATION RATE: 16 BRPM | BODY MASS INDEX: 19.87 KG/M2 | HEART RATE: 72 BPM

## 2024-10-01 DIAGNOSIS — Z12.39 BREAST CANCER SCREENING, HIGH RISK PATIENT: Primary | ICD-10-CM

## 2024-10-01 DIAGNOSIS — Z12.39 BREAST CANCER SCREENING, HIGH RISK PATIENT: ICD-10-CM

## 2024-10-01 DIAGNOSIS — Z80.3 FAMILY HISTORY OF MALIGNANT NEOPLASM OF BREAST: ICD-10-CM

## 2024-10-01 PROCEDURE — 77063 BREAST TOMOSYNTHESIS BI: CPT | Performed by: RADIOLOGY

## 2024-10-01 PROCEDURE — 77067 SCR MAMMO BI INCL CAD: CPT | Performed by: RADIOLOGY

## 2024-10-01 PROCEDURE — 99213 OFFICE O/P EST LOW 20 MIN: CPT

## 2024-10-01 RX ORDER — DEXTROAMPHETAMINE SACCHARATE, AMPHETAMINE ASPARTATE MONOHYDRATE, DEXTROAMPHETAMINE SULFATE AND AMPHETAMINE SULFATE 3.75; 3.75; 3.75; 3.75 MG/1; MG/1; MG/1; MG/1
15 CAPSULE, EXTENDED RELEASE ORAL DAILY
Qty: 30 CAPSULE | Refills: 0 | Status: SHIPPED | OUTPATIENT
Start: 2024-10-03

## 2024-10-01 ASSESSMENT — PAIN SCALES - GENERAL: PAINLEVEL: NO PAIN (0)

## 2024-10-01 NOTE — LETTER
10/1/2024      Joyce Murry  [address] [place] [id number]      Dear Colleague,    Thank you for referring your patient, Joyce Murry, to the [facility]. Please see a copy of my visit note below.    Oncology Risk Management Consultation:  Date on this visit: 10/1/2024    Joyce Murry returns to the Cancer Risk Management Program for an oncology risk management consultation. She requires high risk screening and surveillance to reduce her risk of cancer secondary to having a family history of breast cancer in her mother at age 50, paternal aunt, paternal cousin and paternal grandmother. She is considered to be at high risk for breast cancer and has a 20.4% lifetime risk for breast cancer by the LELAND model.      Primary Physician:  Odalis Pearson MD     History Of Present Illness:  Ms. Murry is a 48 year old female who presents with family history of breast cancer.     Pertinent history:  Menarche at: 13-14  First child at: 33  Menopausal status: Premenopausal  Ovaries, fallopian tubes and uterus intact  Breast Density: scattered areas of fibroglandular density   Hx of OCPs: 1 year  Hx of HRT: none  Hx of breast biopsies: one in , fibroadenoma  Hx of atypia or malignancy.  Diet: fairly balanced, low in processed foods  Alcohol:  about 1 drink per week or less.  Smoking:  former tobacco use from age [age]  Other health habits: exercises daily, lifts weights each morning, walks outside when weather permits    Genetic testin23. The CancerNext Panel was ordered from [employer]. This testing was done because of Joyce's family history of cancer.     Genetic Testing Result: NEGATIVE  Joyce is negative for mutations in the 36 genes analyzed: APC, TANVI, BARD1, BMPR1A, BRCA1, BRCA2, BRIP1, CDH1, CDK4, CDKN2A, CHEK2, DICER1, MLH1, MSH2, MSH6, MUTYH, NBN, NF1, NTHL1, PALB2, PMS2, PTEN, RAD51C, RAD51D, RECQL, SMAD4, SMARCA4, STK11 and TP53 (sequencing and  "deletion/duplication); AXIN2, HOXB13, MSH3, POLD1 and POLE (sequencing only); EPCAM and GREM1 (deletion/duplication only).     A copy of the test report can be found in the Laboratory tab, dated 12/20/23, and named \"LABORATORY MISCELLANEOUS ORDER\". The report is scanned in as a linked document.      Pertinent screening history:  5/15/2014- Bilateral mammogram and left breast US: BiRads4 for 1.3-cm mass in the LEFT breast at 12 o'clock is at a low suspicion for malignancy.  Biopsy is recommended.     5/22/2014: Left breast core needle biopsy:  The pathologic findings of the LEFT breast ultrasound-guided biopsy at 12  o'clock, 4 cm from the nipple showed fibroadenoma.   5/22/2015- Screening mammogram, ACR2  5/25/2016- Screening tomosynthesis mammogram, BiRads0 for FINDINGS: RIGHT Breast: In the slightly upper breast on the MLO view, 7 cm posterior  to the nipple is a 0.9 cm. This is not well seen on the CC view; however,  tomosynthesis images place this in the lateral breast. LEFT Breast: No suspicious findings. Biopsy clips noted.   6/3/2016- Right diagnostic mammogram, BiRads1  5/30/2017- Screening tomosynthesis mammogram, BiRads1  5/31/2018- Screening tomosynthesis mammogram, BiRads1  6/3/2019- Screening tomosynthesis mammogram, BiRads1  6/16/2020- Diagnostic tomosynthesis mammogram, BiRads1  6/17/2021- Screening tomosynthesis mammogram, BiRads1  6/29/2022- Screening tomosynthesis mammogram, BiRads1  7/17/2023- Screening tomosynthesis mammogram, BiRads1  3/17/2024: Breast MRI, BiRads2    At this visit, she denies new fatigue, breast pain, asymmetry, lumps, masses, thickening, nipple discharge and skin changes in her breasts.    Past Medical/Surgical History:  Past Medical History:   Diagnosis Date     ADD (attention deficit disorder) without hyperactivity      Anxiety      Family history of malignant neoplasm of breast     genetic testing negative 12/23, high risk for breast cancer so annual MRI advised     Genital " herpes      History of hyperlipidemia, improved as of  with diet and exercise      Menstrual migraine      Past Surgical History:   Procedure Laterality Date     COLONOSCOPY  2017    Dr. Joyce [facility]     COLONOSCOPY N/A 2017    Procedure: COLONOSCOPY;  Colonoscopy ;  Surgeon: Leona Joyce MD;  Location:  GI     COLONOSCOPY N/A 2022    Procedure: COLONOSCOPY crsal;  Surgeon: Leona Joyce MD;  Location:  GI     D & C       HEAD & NECK SURGERY      wisdom teeth removed       Allergies:  Allergies as of 10/01/2024 - Reviewed 10/01/2024   Allergen Reaction Noted     Amoxicillin-pot clavulanate Rash 10/08/2012       Current Medications:  Current Outpatient Medications   Medication Sig Dispense Refill     amphetamine-dextroamphetamine (ADDERALL XR) 10 MG 24 hr capsule Take 1 capsule (10 mg) by mouth every morning 14 capsule 0     amphetamine-dextroamphetamine (ADDERALL XR) 15 MG 24 hr capsule Take 1 capsule (15 mg) by mouth daily. 30 capsule 0     KARINA 1.5/30 1.5-30 MG-MCG tablet Take 1 tablet by mouth daily       tretinoin (RETIN-A) 0.1 % external cream APPLY PEA SIZE AMOUNT TO FACE QHS AS TOLERATED  11        Family History:  Family History   Problem Relation Age of Onset     Breast Cancer Mother 50        second occurrence in late 50s, then in 80s with metastatic (cause of death)     Diabetes Type 1 Mother      Leukemia Mother 60 - 69        CLL     Skin Cancer Mother         arm     Hypertension Father         pulmonary HTN     Heart Disease Father 55        CAD-      Diabetes Father 65        type 2     Lung Cancer Maternal Grandfather 76        ; hx of smoking     Breast Cancer Paternal Grandmother          in late 70s     Hodgkin's lymphoma Paternal Grandfather 45         in late 40s, early 50s     Anxiety Disorder Sister      Attention Deficit Disorder Sister      Attention Deficit Disorder Daughter      Pancreatic Cancer Maternal Uncle 87         in  late 80s     Breast Cancer Paternal Aunt          in 70s     Pancreatic Cancer Paternal Aunt      Alzheimer Disease Paternal Aunt 70        possibly also had breast cancer     Breast Cancer Paternal Cousin 52     Colon Cancer No family hx of        Social History:  Social History     Socioeconomic History     Marital status:      Spouse name: Not on file     Number of children: Not on file     Years of education: Not on file     Highest education level: Not on file   Occupational History     Not on file   Tobacco Use     Smoking status: Former     Current packs/day: 0.00     Types: Cigarettes     Quit date: 2002     Years since quittin.6     Passive exposure: Never     Smokeless tobacco: Never   Vaping Use     Vaping status: Never Used   Substance and Sexual Activity     Alcohol use: Yes     Comment: 1-2 drink once every 2 weeks     Drug use: Never     Sexual activity: Yes     Partners: Male   Other Topics Concern     Parent/sibling w/ CABG, MI or angioplasty before 65F 55M? 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 , designs jewelry    Tobacco-none, quit , smoked 1 ppd x 10 yrs    ETOH- rare    Exercise-  3/week-gym; body pump and kickboxing         Social Determinants of Health     Financial Resource Strain: Low Risk  (2024)    Financial Resource Strain      Within the past 12 months, have you or your family members you live with been unable to get utilities (heat, electricity) when it was really needed?: No   Food Insecurity: Low Risk  (2024)    Food Insecurity      Within the past 12 months, did you worry that your food would run out before you got money to buy more?: No      Within the past 12 months, did the food you bought just not last and you didn t have money to get more?: No   Transportation Needs: Low Risk  (2024)    Transportation Needs      Within the past 12 months, has lack of transportation kept  you from medical appointments, getting your medicines, non-medical meetings or appointments, work, or from getting things that you need?: No   Physical Activity: Not on file   Stress: Not on file   Social Connections: Unknown (12/30/2021)    [facility], [facility]    Social Connections      Frequency of Communication with Friends and Family: Not on file   Interpersonal Safety: Not on file   Housing Stability: Low Risk  (4/2/2024)    Housing Stability      Do you have housing? : Yes      Are you worried about losing your housing?: No       Physical Exam:  /75 (BP Location: Right arm, Patient Position: Sitting, Cuff Size: Adult Regular)   Pulse 72   Temp 98.2  F (36.8  C) (Oral)   Resp 16   Wt 56.7 kg (125 lb)   SpO2 100%   BMI 19.87 kg/m    GENERAL APPEARANCE: healthy, alert and no apparent distress  BREAST: A multipositional, bilateral breast exam was performed.  Fairly symmetrical, -Rsl>L. Nipples everted bilaterally. Right breast: no palpable dominant masses, no nipple discharge, no skin changes. Right axilla: no palpable adenopathy. Left breast: no palpable dominant masses, no nipple discharge, no skin changes. Left axilla: no palpable adenopathy.   LYMPHATICS: No cervical, supraclavicular, or axillary lymphadenopathy  SKIN: no suspicious lesions or rashes on examined skin    Laboratory/Imaging Studies  No results found for any visits on 10/01/24.    KAMRAN Cook reports that she is doing well at this visit. She has no concerns with her breast tissue, and no updates to her family's medical history. We discussed her last screening tests and reviewed results. We discussed concerns and symptoms to be watchful for between visits, including breast lumps, bumps, nipple inversion, nipple discharge and any changes in skin including crinkled or puckered skin. We reviewed the recommendation for breast self awareness. Her exam  today was unremarkable. We will proceed with the plan below.       Individualized Surveillance Plan for women  With 20% or greater lifetime risk of breast cancer   Per NCCN Breast Cancer Screening and Diagnosis Guidelines Version 2.2024   Recommended screening Test or procedure Last done Next Scheduled    Clinical encounter Clinical exam every 6-12 months.   Refer to genetic counseling if not already done.  Consider risk reduction strategies.   September 2024 October 2025   However, some family histories with breast cancers at a very young age, may warrant screening starting earlier.    *May begin at age 40 if breast cancers in the family occur at later ages.    Annual mammogram beginning 10 years younger than the earliest breast cancer in the family but not prior to age 30.    Recommend annual breast MRI to begin 10 years younger than the earliest breast cancer in the family but not prior to age 25.    Breast MRIs are preferably done on day 7-15 of the menstrual cycle in premenopausal women.   7/17/2023- Screening tomosynthesis mammogram, BiRads1    3/17/2024: Breast MRI, BiRads2   Mammogram today    Breast MRI in April, 2025    Return to clinic in October 2025 with a mammogram following our visit   Breast screening for patients at high risk due to thoracic radiation between the ages of 10-30   Annual clinical exam beginning 8 years after radiation therapy.    Annual screening mammogram beginning at age 30 or 8 years after radiation therapy    Annual breast MRI, beginning at age 25 or 8 years after radiation therapy.     NA   NA   Women who have a lifetime risk of >20% based on history of LCIS or ADH/ALH Annual screening mammogram beginning at age of LCIS or ADH/ALH but not prior to age 30.    Consider annual MRI to begin at age of diagnosis of LCIS or ADH/ALH but not prior to age 25.    Consider risk reducing strategies.   NA   NA    Recommend risk reducing strategies for women with 1.7% 5 year risk of breast  cancer.               I spent a total of 20 minutes on the day of the visit. Please see the note for further information on patient assessment and treatment.     Lita Villafuerte DNP, KAREN, [facility]  Clinical Nurse Specialist  Cancer Risk Management Program  [facility]    Cc:  Odalis Pearson MD        Again, thank you for allowing me to participate in the care of your patient.        Sincerely,        KAREN Kay CNP

## 2024-10-01 NOTE — PATIENT INSTRUCTIONS
Individualized Surveillance Plan for women  With 20% or greater lifetime risk of breast cancer   Per NCCN Breast Cancer Screening and Diagnosis Guidelines Version 2.2024   Recommended screening Test or procedure Last done Next Scheduled    Clinical encounter Clinical exam every 6-12 months.   Refer to genetic counseling if not already done.  Consider risk reduction strategies.   September 2024 October 2025   However, some family histories with breast cancers at a very young age, may warrant screening starting earlier.    *May begin at age 40 if breast cancers in the family occur at later ages.    Annual mammogram beginning 10 years younger than the earliest breast cancer in the family but not prior to age 30.    Recommend annual breast MRI to begin 10 years younger than the earliest breast cancer in the family but not prior to age 25.    Breast MRIs are preferably done on day 7-15 of the menstrual cycle in premenopausal women.   7/17/2023- Screening tomosynthesis mammogram, BiRads1    3/17/2024: Breast MRI, BiRads2   Mammogram today    Breast MRI in April, 2025    Return to clinic in October 2025 with a mammogram following our visit   Breast screening for patients at high risk due to thoracic radiation between the ages of 10-30   Annual clinical exam beginning 8 years after radiation therapy.    Annual screening mammogram beginning at age 30 or 8 years after radiation therapy    Annual breast MRI, beginning at age 25 or 8 years after radiation therapy.     NA   NA   Women who have a lifetime risk of >20% based on history of LCIS or ADH/ALH Annual screening mammogram beginning at age of LCIS or ADH/ALH but not prior to age 30.    Consider annual MRI to begin at age of diagnosis of LCIS or ADH/ALH but not prior to age 25.    Consider risk reducing strategies.   NA   NA    Recommend risk reducing strategies for women with 1.7% 5 year risk of breast cancer.

## 2024-10-27 ASSESSMENT — PATIENT HEALTH QUESTIONNAIRE - PHQ9
SUM OF ALL RESPONSES TO PHQ QUESTIONS 1-9: 3
SUM OF ALL RESPONSES TO PHQ QUESTIONS 1-9: 3
10. IF YOU CHECKED OFF ANY PROBLEMS, HOW DIFFICULT HAVE THESE PROBLEMS MADE IT FOR YOU TO DO YOUR WORK, TAKE CARE OF THINGS AT HOME, OR GET ALONG WITH OTHER PEOPLE: NOT DIFFICULT AT ALL

## 2024-10-28 ENCOUNTER — OFFICE VISIT (OUTPATIENT)
Dept: INTERNAL MEDICINE | Facility: CLINIC | Age: 49
End: 2024-10-28
Payer: COMMERCIAL

## 2024-10-28 VITALS
WEIGHT: 123.7 LBS | RESPIRATION RATE: 14 BRPM | BODY MASS INDEX: 19.42 KG/M2 | SYSTOLIC BLOOD PRESSURE: 109 MMHG | OXYGEN SATURATION: 100 % | HEART RATE: 64 BPM | TEMPERATURE: 97.6 F | DIASTOLIC BLOOD PRESSURE: 71 MMHG | HEIGHT: 67 IN

## 2024-10-28 DIAGNOSIS — R42 DIZZINESS: ICD-10-CM

## 2024-10-28 DIAGNOSIS — F98.8 ADD (ATTENTION DEFICIT DISORDER) WITHOUT HYPERACTIVITY: Primary | ICD-10-CM

## 2024-10-28 DIAGNOSIS — K40.91 UNILATERAL RECURRENT INGUINAL HERNIA WITHOUT OBSTRUCTION OR GANGRENE: ICD-10-CM

## 2024-10-28 DIAGNOSIS — H91.93 HEARING DIFFICULTY OF BOTH EARS: ICD-10-CM

## 2024-10-28 DIAGNOSIS — F51.04 PSYCHOPHYSIOLOGICAL INSOMNIA: ICD-10-CM

## 2024-10-28 DIAGNOSIS — Z23 NEED FOR INFLUENZA VACCINATION: ICD-10-CM

## 2024-10-28 DIAGNOSIS — Z23 NEED FOR COVID-19 VACCINE: ICD-10-CM

## 2024-10-28 PROCEDURE — 99215 OFFICE O/P EST HI 40 MIN: CPT | Mod: 25 | Performed by: INTERNAL MEDICINE

## 2024-10-28 PROCEDURE — 90656 IIV3 VACC NO PRSV 0.5 ML IM: CPT | Performed by: INTERNAL MEDICINE

## 2024-10-28 PROCEDURE — 90480 ADMN SARSCOV2 VAC 1/ONLY CMP: CPT | Performed by: INTERNAL MEDICINE

## 2024-10-28 PROCEDURE — 90471 IMMUNIZATION ADMIN: CPT | Performed by: INTERNAL MEDICINE

## 2024-10-28 PROCEDURE — 91320 SARSCV2 VAC 30MCG TRS-SUC IM: CPT | Performed by: INTERNAL MEDICINE

## 2024-10-28 RX ORDER — DEXTROAMPHETAMINE SACCHARATE, AMPHETAMINE ASPARTATE MONOHYDRATE, DEXTROAMPHETAMINE SULFATE AND AMPHETAMINE SULFATE 3.75; 3.75; 3.75; 3.75 MG/1; MG/1; MG/1; MG/1
15 CAPSULE, EXTENDED RELEASE ORAL DAILY
Qty: 30 CAPSULE | Refills: 0 | Status: SHIPPED | OUTPATIENT
Start: 2024-11-03 | End: 2024-11-08

## 2024-10-28 NOTE — PROGRESS NOTES
Assessment & Plan     ADD (attention deficit disorder) without hyperactivity  Doing well on this, with some persistent irritability. Discussed that a stimulant would not necessarily target these symptoms. She is interested in trying vyvanse upon return from travel in November.  - amphetamine-dextroamphetamine (ADDERALL XR) 15 MG 24 hr capsule; Take 1 capsule (15 mg) by mouth daily.    Psychophysiological insomnia  Discussed normal changes with menopause, sleep hygiene, anxiety triggers, Discussed OTC supplements in addition to prescription management.  She will try unisom, could try trazodone in future. She is already on hormonal therapy.    Hearing difficulty of both ears  - Adult Audiology  Referral; Future    Unilateral recurrent inguinal hernia without obstruction or gangrene  Discussed expectant management.    Dizziness  Discussed mild vestibular changes, possibly mild BPPV. Advised watchful waiting.    Need for COVID-19 vaccine  - COVID-19 12+ (PFIZER)    Need for influenza vaccination  - INFLUENZA VACCINE, SPLIT VIRUS, TRIVALENT,PF (FLUZONE\FLUARIX)                No follow-ups on file.    Subjective   Joyce is a 49 year old, presenting for the following health issues:  Medication Request, Immunization, Hernia, and Sleep Problem      10/28/2024     8:35 AM   Additional Questions   Roomed by roberth     History of Present Illness       Reason for visit:  Well check, Medication management,  Flu shot, hernia, sleep, general health   She is taking medications regularly.       Joyce has PMH of depression, menstrual migraines, ADD    She was started on Adderall last summer after diagnosis of ADD, felt better taking neurostimulant, positive effects on mood, feels a bit jittery after taking but has noticed some irritability coming back  She recently obtained a therapist  Would like to try vyvanse perhaps    Saw Gyn for physical in May, started on OCP continuously for migraines, had pap smear at that  "time    Breast cancer in mom and cousin, sister did genetic testing, negative, mom had breast cancer in  50s x 2  Saw High Risk Cancer clinic, did genetic screening which was neg, qualifies for high risk breast cancer screening  Father  doctor, had heart disease    Concerns about sleep, falls asleep okay, wakes up around 2-4 am often, has some anxious thoughts when she wakes up, tried magnesium, melatonin    Reports mild dizziness, nausea, wondering about hearing, dementia, knee joint pain (improved, had MRI, saw ortho)    Hernia, inguinal, diagnosed by Ob, 3-4 years    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      Routine Health Maintenence:  Lung CT: n/a  Colonoscopy (45-75 yrs):   7/22 Impression:               - The examined portion of the ileum was normal.                             - The entire examined colon is normal on direct and                             retroflexion views.                             - No specimens collected.   Recommendation:           - Repeat colonoscopy in 10 years for screening                             purposes.                                           Dexa (>65W or 70M yrs): n/a  Mammogram (40-75 yrs): High risk breast cancer screening/genetic screening neg 2024; breast MR 3/24, mammo 10/24  Pap (21-65 yrs): 5/24 pap/HPV neg per outside doctor  11/20 NIL, HPV neg                      Objective    /71 (BP Location: Right arm, Patient Position: Sitting, Cuff Size: Adult Regular)   Pulse 64   Temp 97.6  F (36.4  C) (Oral)   Resp 14   Ht 1.689 m (5' 6.5\")   Wt 56.1 kg (123 lb 11.2 oz)   SpO2 100%   Breastfeeding No   BMI 19.67 kg/m    Body mass index is 19.67 kg/m .  Physical Exam   Constitutional: Alert, oriented, pleasant, no acute distress  Head: Normocephalic, atraumatic  Eyes: Extra-ocular movements intact, no scleral icterus  Neck: " Supple  Cardiovascular: Regular rate and rhythm, no murmurs, rubs or gallops, peripheral pulses full/symmetric  Respiratory: Good air movement bilaterally, lungs clear, no wheezes/rales/rhonchi  Musculoskeletal: No edema, normal muscle tone, normal gait  Neurologic: Alert and oriented, cranial nerves 2-12 intact, no focal deficits  Psychiatric: normal mentation, affect and mood              >40 minutes spent today performing chart review, history and exam, documentation and further activities as noted above, exclusive of any procedures or EKG interpretation      Signed Electronically by: Odalis Pearson MD

## 2024-10-28 NOTE — PATIENT INSTRUCTIONS
Doxylamine  (Unisom)  25 mg,  start 1/2 tablet    Trazodone--  can be used for sleep    Magnesium -- 400 mg for sleep

## 2024-10-29 NOTE — PROGRESS NOTES
AUDIOLOGY REPORT    SUBJECTIVE:  Joyce Murry is a 49 year old female who was seen on 11/4/24 in the Audiology Clinic at the [facility] for audiologic evaluation, referred by Odalis Pearson M.D.      Patient reports increasing concerns about hearing.  She is unsure if it's environment acoustics, distractions, or hearing changes.  She notes intermittent bilateral tinnitus (left may be worse than right).  Joyce denies ear pain, drainage from ears, aural fullness, history of ear surgery, or history of noise exposure.  She denies dizziness but notes lightheadedness when standing up (she attributes this to low blood pressure) and sometimes notes imbalance, especially on stairs which causes anxiety.  There is a family history of hearing loss.      OBJECTIVE:  Abuse Screening:  Do you feel unsafe at home or work/school? No  Do you feel threatened by someone? No  Does anyone try to keep you from having contact with others, or doing things outside of your home? No  Physical signs of abuse present? No     Fall Risk Screen:  1. Have you fallen two or more times in the past year? No  2. Have you fallen and had an injury in the past year? No    Timed Up and Go Score (in seconds): not tested  Is patient a fall risk? No  Referral initiated: No  Fall Risk Assessment Completed by Audiology    Otoscopic exam indicates ears are clear of cerumen bilaterally.      Pure Tone Thresholds assessed using conventional audiometry with good reliability from 250-8000 Hz bilaterally using insert earphones and circumaural headphones.      RIGHT: Normal hearing    LEFT: Normal hearing     Tympanogram:    RIGHT: normal eardrum mobility    LEFT:   normal eardrum mobility    Reflexes (reported by stimulus ear):  RIGHT: Ipsilateral is present at normal levels  RIGHT: Contralateral is present at normal levels  LEFT:   Ipsilateral is present at normal levels  LEFT:   Contralateral is present at normal  levels      Speech Reception Threshold:    RIGHT: 15 dB HL    LEFT:   15 dB HL  Word Recognition Score:     RIGHT: 96% at 55 dB HL using NU-6 recorded word list.    LEFT:   100% at 55 dB HL using NU-6 recorded word list.      ASSESSMENT:   Normal hearing in both ears.     Today s results were discussed with the patient in detail.     PLAN:    Recheck hearing if changes.    Provided handout on communication strategies.    Encourage use of hearing protection.   Follow up with PCP regarding imbalance on stairs.      The patient expressed understanding and agreement with this plan.    Andres Carbajal, CCC-A, [facility]  Licensed Audiologist  [id number] #[id number]    Cc Odalis Pearson M.D.

## 2024-10-31 ENCOUNTER — TRANSFERRED RECORDS (OUTPATIENT)
Dept: HEALTH INFORMATION MANAGEMENT | Facility: CLINIC | Age: 49
End: 2024-10-31
Payer: COMMERCIAL

## 2024-11-04 ENCOUNTER — OFFICE VISIT (OUTPATIENT)
Dept: AUDIOLOGY | Facility: CLINIC | Age: 49
End: 2024-11-04
Attending: INTERNAL MEDICINE
Payer: COMMERCIAL

## 2024-11-04 DIAGNOSIS — H93.13 TINNITUS OF BOTH EARS: Primary | ICD-10-CM

## 2024-11-04 DIAGNOSIS — H91.93 HEARING DIFFICULTY OF BOTH EARS: ICD-10-CM

## 2024-11-04 PROCEDURE — 92550 TYMPANOMETRY & REFLEX THRESH: CPT | Performed by: AUDIOLOGIST-HEARING AID FITTER

## 2024-11-04 PROCEDURE — 92557 COMPREHENSIVE HEARING TEST: CPT | Performed by: AUDIOLOGIST-HEARING AID FITTER

## 2024-11-04 NOTE — Clinical Note
Dr. Pearson,   Patient was in for audiogram today and has normal hearing bilaterally.   She mentioned anxiety about imbalance on stairs, and I told her I would report this to you in case any referrals were indicated.   Thanks,  Andres Carbajal, CCC-A, [facility] Licensed Audiologist [place] #[id number]

## 2024-11-08 ENCOUNTER — TELEPHONE (OUTPATIENT)
Dept: INTERNAL MEDICINE | Facility: CLINIC | Age: 49
End: 2024-11-08
Payer: COMMERCIAL

## 2024-11-08 DIAGNOSIS — F98.8 ADD (ATTENTION DEFICIT DISORDER) WITHOUT HYPERACTIVITY: ICD-10-CM

## 2024-11-08 RX ORDER — DEXTROAMPHETAMINE SACCHARATE, AMPHETAMINE ASPARTATE MONOHYDRATE, DEXTROAMPHETAMINE SULFATE AND AMPHETAMINE SULFATE 3.75; 3.75; 3.75; 3.75 MG/1; MG/1; MG/1; MG/1
15 CAPSULE, EXTENDED RELEASE ORAL DAILY
Qty: 30 CAPSULE | Refills: 0 | Status: SHIPPED | OUTPATIENT
Start: 2024-11-08

## 2024-11-08 NOTE — TELEPHONE ENCOUNTER
[facility] Call Center    Phone Message    May a detailed message be left on voicemail: yes     Reason for Call: Other: Pt needs a refill on her Adderral and her pharmacy is all out of the med, she is wondering if she could come by today to  a hard copy of the prescription to bring it into a pharmacy to  the med as she is going out of the country on Monday. Please advise.       Action Taken: Other: PCC    Travel Screening: Not Applicable     Date of Service: 11/08/24

## 2024-11-08 NOTE — TELEPHONE ENCOUNTER
The [facility] [place] PCC RN called and spoke with the [facility] [place] pharmacy staff. Per [facility] [place] Pharmacy staff, the [facility] [place] Pharm[facility] has generic (ADDERALL XR) 15 MG 24 hr capsule in stock. The [facility] [place] [place] P[facility] RN called and spoke with the patient to update the patient that the [facility] [place] [place] pharmacy had generic (ADDERALL XR) 15 MG 24 hr capsule in stock. The patient stated to the RN that she was comfortable having the prescription for (ADDERALL XR) 15 MG 24 hr capsule sent to the [facility] pharmacy.     Per [facility], Dextroamp-Amphet Er 15 Mg Cap, Q=30.00 last sold on 10/04/2024.

## 2024-12-03 ENCOUNTER — MYC REFILL (OUTPATIENT)
Dept: INTERNAL MEDICINE | Facility: CLINIC | Age: 49
End: 2024-12-03
Payer: COMMERCIAL

## 2024-12-03 DIAGNOSIS — F98.8 ADD (ATTENTION DEFICIT DISORDER) WITHOUT HYPERACTIVITY: ICD-10-CM

## 2024-12-04 RX ORDER — DEXTROAMPHETAMINE SACCHARATE, AMPHETAMINE ASPARTATE MONOHYDRATE, DEXTROAMPHETAMINE SULFATE AND AMPHETAMINE SULFATE 3.75; 3.75; 3.75; 3.75 MG/1; MG/1; MG/1; MG/1
15 CAPSULE, EXTENDED RELEASE ORAL DAILY
Qty: 30 CAPSULE | Refills: 0 | Status: SHIPPED | OUTPATIENT
Start: 2024-12-09

## 2024-12-04 NOTE — TELEPHONE ENCOUNTER
Controlled substance [place] request notes - [place] reviewed 12/04/24    Refill request received for: Dextroamp-Amphet Er 15 Mg Cap  [place]  data reviewed:  Medication last refill: 30 tab, 30 day supply, filled/sold to patient on 11/09/2024  Pended order: Dextroamp-Amphet Er 15 Mg Cap, 30 tab, 30 day supply, fill on or after 12/09/2024     Primary care provider: Odalis Pearson  Last office visit this department: 10/28/2024  Last virtual visit this department: 4/17/2024  Next appointment with PCP: None    Refill request forwarded to provider for review.     Akiko HILL LPN  [facility]

## 2025-01-04 ENCOUNTER — MYC REFILL (OUTPATIENT)
Dept: INTERNAL MEDICINE | Facility: CLINIC | Age: 50
End: 2025-01-04
Payer: COMMERCIAL

## 2025-01-04 DIAGNOSIS — F98.8 ADD (ATTENTION DEFICIT DISORDER) WITHOUT HYPERACTIVITY: ICD-10-CM

## 2025-01-06 RX ORDER — DEXTROAMPHETAMINE SACCHARATE, AMPHETAMINE ASPARTATE MONOHYDRATE, DEXTROAMPHETAMINE SULFATE AND AMPHETAMINE SULFATE 3.75; 3.75; 3.75; 3.75 MG/1; MG/1; MG/1; MG/1
15 CAPSULE, EXTENDED RELEASE ORAL DAILY
Qty: 30 CAPSULE | Refills: 0 | Status: SHIPPED | OUTPATIENT
Start: 2025-01-08

## 2025-01-06 NOTE — TELEPHONE ENCOUNTER
Controlled substance refill request notes    Refill request received for: Dextroamp-Amphet Er 15 Mg Cap  [place]  data reviewed 01/06/25:  Medication last refill: 30 cap, 30 day supply, filled/sold to patient on 12/09/2024  Pended order: Dextroamp-Amphet Er 15 Mg Cap, 30 cap, 30 day supply, fill on or after 01/08/2025     Primary care provider: Odalis Pearson  Last office visit this department: 10/28/2024  Last virtual visit this department: 4/17/2024  Next appointment with PCP: None    Refill request forwarded to provider for review.     Akiko HILL LPN  [facility]

## 2025-02-06 ENCOUNTER — MYC REFILL (OUTPATIENT)
Dept: INTERNAL MEDICINE | Facility: CLINIC | Age: 50
End: 2025-02-06
Payer: COMMERCIAL

## 2025-02-06 DIAGNOSIS — F98.8 ADD (ATTENTION DEFICIT DISORDER) WITHOUT HYPERACTIVITY: ICD-10-CM

## 2025-02-06 RX ORDER — DEXTROAMPHETAMINE SACCHARATE, AMPHETAMINE ASPARTATE MONOHYDRATE, DEXTROAMPHETAMINE SULFATE AND AMPHETAMINE SULFATE 3.75; 3.75; 3.75; 3.75 MG/1; MG/1; MG/1; MG/1
15 CAPSULE, EXTENDED RELEASE ORAL DAILY
Qty: 30 CAPSULE | Refills: 0 | Status: SHIPPED | OUTPATIENT
Start: 2025-02-07

## 2025-02-06 NOTE — TELEPHONE ENCOUNTER
Controlled substance refill request notes    Refill request received for: Dextroamp-Amphet Er 15 Mg Cap  [place]  data reviewed 02/06/25:  Medication last refill: 30 tab, 30 day supply, filled/sold to patient on 01/08/2025  Pended order: Dextroamp-Amphet Er 15 Mg Cap, 30 tab, 30 day supply, fill on or after 02/07/2025     Primary care provider: Odalis Pearson  Last office visit this department: 10/28/2024  Last virtual visit this department: 4/17/2024  Next appointment with PCP: None    Refill request forwarded to provider for review.     Akiko HILL LPN  [facility]

## 2025-03-06 ENCOUNTER — MYC MEDICAL ADVICE (OUTPATIENT)
Dept: ONCOLOGY | Facility: CLINIC | Age: 50
End: 2025-03-06
Payer: COMMERCIAL

## 2025-03-06 ENCOUNTER — MYC REFILL (OUTPATIENT)
Dept: INTERNAL MEDICINE | Facility: CLINIC | Age: 50
End: 2025-03-06
Payer: COMMERCIAL

## 2025-03-06 DIAGNOSIS — Z12.39 BREAST CANCER SCREENING, HIGH RISK PATIENT: Primary | ICD-10-CM

## 2025-03-06 DIAGNOSIS — F98.8 ADD (ATTENTION DEFICIT DISORDER) WITHOUT HYPERACTIVITY: ICD-10-CM

## 2025-03-06 RX ORDER — DIAZEPAM 5 MG/1
5 TABLET ORAL 2 TIMES DAILY PRN
Qty: 2 TABLET | Refills: 0 | Status: SHIPPED | OUTPATIENT
Start: 2025-03-06

## 2025-03-06 RX ORDER — DEXTROAMPHETAMINE SACCHARATE, AMPHETAMINE ASPARTATE MONOHYDRATE, DEXTROAMPHETAMINE SULFATE AND AMPHETAMINE SULFATE 3.75; 3.75; 3.75; 3.75 MG/1; MG/1; MG/1; MG/1
15 CAPSULE, EXTENDED RELEASE ORAL DAILY
Qty: 30 CAPSULE | Refills: 0 | Status: CANCELLED | OUTPATIENT
Start: 2025-03-11

## 2025-03-06 RX ORDER — DEXTROAMPHETAMINE SACCHARATE, AMPHETAMINE ASPARTATE MONOHYDRATE, DEXTROAMPHETAMINE SULFATE AND AMPHETAMINE SULFATE 3.75; 3.75; 3.75; 3.75 MG/1; MG/1; MG/1; MG/1
15 CAPSULE, EXTENDED RELEASE ORAL DAILY
Qty: 30 CAPSULE | Refills: 0 | Status: SHIPPED | OUTPATIENT
Start: 2025-03-08 | End: 2025-04-07

## 2025-03-06 RX ORDER — DEXTROAMPHETAMINE SACCHARATE, AMPHETAMINE ASPARTATE MONOHYDRATE, DEXTROAMPHETAMINE SULFATE AND AMPHETAMINE SULFATE 3.75; 3.75; 3.75; 3.75 MG/1; MG/1; MG/1; MG/1
15 CAPSULE, EXTENDED RELEASE ORAL DAILY
Qty: 30 CAPSULE | Refills: 0 | Status: SHIPPED | OUTPATIENT
Start: 2025-04-07 | End: 2025-05-07

## 2025-03-06 RX ORDER — DEXTROAMPHETAMINE SACCHARATE, AMPHETAMINE ASPARTATE MONOHYDRATE, DEXTROAMPHETAMINE SULFATE AND AMPHETAMINE SULFATE 3.75; 3.75; 3.75; 3.75 MG/1; MG/1; MG/1; MG/1
15 CAPSULE, EXTENDED RELEASE ORAL DAILY
Qty: 30 CAPSULE | Refills: 0 | Status: SHIPPED | OUTPATIENT
Start: 2025-05-07 | End: 2025-06-06

## 2025-03-06 NOTE — TELEPHONE ENCOUNTER
Controlled substance refill request notes    Refill request received for: Dextroamp-Amphet Er 15 Mg Cap  [place]  data reviewed 03/06/25:  Medication last refill: 30 tab, 30 day supply, filled/sold to patient on 02/09/2025  Pended order: Dextroamp-Amphet Er 15 Mg Cap, 30 tab, 30 day supply, fill on or after 03/11/2025     Primary care provider: Odalis Pearson  Last office visit this department: 10/28/2024 - Dr. Pearson  Last virtual visit this department: 4/17/2024 - Dr. Pearson  Next appointment with PCP: None    Refill request forwarded to provider for review.     Akiko HILL LPN  [facility]

## 2025-04-11 ENCOUNTER — MYC REFILL (OUTPATIENT)
Dept: INTERNAL MEDICINE | Facility: CLINIC | Age: 50
End: 2025-04-11
Payer: COMMERCIAL

## 2025-04-11 DIAGNOSIS — F98.8 ADD (ATTENTION DEFICIT DISORDER) WITHOUT HYPERACTIVITY: ICD-10-CM

## 2025-04-11 RX ORDER — DEXTROAMPHETAMINE SACCHARATE, AMPHETAMINE ASPARTATE MONOHYDRATE, DEXTROAMPHETAMINE SULFATE AND AMPHETAMINE SULFATE 3.75; 3.75; 3.75; 3.75 MG/1; MG/1; MG/1; MG/1
15 CAPSULE, EXTENDED RELEASE ORAL DAILY
Qty: 30 CAPSULE | Refills: 0 | Status: CANCELLED | OUTPATIENT
Start: 2025-04-11

## 2025-04-16 ENCOUNTER — MYC MEDICAL ADVICE (OUTPATIENT)
Dept: INTERNAL MEDICINE | Facility: CLINIC | Age: 50
End: 2025-04-16
Payer: COMMERCIAL

## 2025-04-16 NOTE — TELEPHONE ENCOUNTER
The RN called and spoke with staff at the patient's pharmacy. Per pharmacy staff, the patient has Adderall XR 15 mg capsules x2 prescriptions available at the pharmacy. Pharmacy staff stated that they would work on filling the Adderall prescription and stated that the patient would need to call the pharmacy for the next refill.

## 2025-05-06 ENCOUNTER — MYC MEDICAL ADVICE (OUTPATIENT)
Dept: INTERNAL MEDICINE | Facility: CLINIC | Age: 50
End: 2025-05-06
Payer: COMMERCIAL

## 2025-05-08 NOTE — TELEPHONE ENCOUNTER
Left Voicemail (1st Attempt) for the patient to call back and schedule the following:    Appointment type: Return/Video Return   Provider: PCP  Return date: Next available   Specialty phone number: [phone]

## 2025-05-12 NOTE — TELEPHONE ENCOUNTER
Left Voicemail (2nd Attempt) for the patient to call back and schedule the following:      Appointment type: Return/Video Return   Provider: PCP  Return date: Next available   Specialty phone number: [phone]

## 2025-06-08 ENCOUNTER — ANCILLARY PROCEDURE (OUTPATIENT)
Dept: MRI IMAGING | Facility: CLINIC | Age: 50
End: 2025-06-08
Payer: COMMERCIAL

## 2025-06-08 DIAGNOSIS — Z12.39 BREAST CANCER SCREENING, HIGH RISK PATIENT: ICD-10-CM

## 2025-06-08 PROCEDURE — 77049 MRI BREAST C-+ W/CAD BI: CPT | Performed by: RADIOLOGY

## 2025-06-08 PROCEDURE — A9585 GADOBUTROL INJECTION: HCPCS | Performed by: RADIOLOGY

## 2025-06-08 RX ORDER — GADOBUTROL 604.72 MG/ML
7.5 INJECTION INTRAVENOUS ONCE
Status: COMPLETED | OUTPATIENT
Start: 2025-06-08 | End: 2025-06-08

## 2025-06-08 RX ADMIN — GADOBUTROL 5.5 ML: 604.72 INJECTION INTRAVENOUS at 08:33

## 2025-06-08 NOTE — DISCHARGE INSTRUCTIONS
MRI Contrast Discharge Instructions    The IV contrast you received today will pass out of your body in your  urine. This will happen in the next 24 hours. You will not feel this process.  Your urine will not change color.    Drink at least 4 extra glasses of water or juice today (unless your doctor  has restricted your fluids). This reduces the stress on your kidneys.  You may take your regular medicines.    If you are on dialysis: It is best to have dialysis today.    If you have a reaction: Most reactions happen right away. If you have  any new symptoms after leaving the hospital (such as hives or swelling),  call your hospital at the correct number below. Or call your family doctor.  If you have breathing distress or wheezing, call 911.    Special instructions: ***    I have read and understand the above information.    Signature:______________________________________ Date:___________    Staff:__________________________________________ Date:___________     Time:__________    [facility] Departments:    ___[place]: [phone]  ___[facility]: [phone]  ___[place]: [phone] ___[facility]: [phone]  ___[facility]: [phone]  ___[facility]: [phone]  ___[phone]  ___[facility]: [phone]  ___[phone]

## 2025-06-09 ENCOUNTER — RESULTS FOLLOW-UP (OUTPATIENT)
Dept: ONCOLOGY | Facility: CLINIC | Age: 50
End: 2025-06-09

## 2025-06-21 ENCOUNTER — HEALTH MAINTENANCE LETTER (OUTPATIENT)
Age: 50
End: 2025-06-21

## 2025-06-25 ENCOUNTER — MYC REFILL (OUTPATIENT)
Dept: INTERNAL MEDICINE | Facility: CLINIC | Age: 50
End: 2025-06-25
Payer: COMMERCIAL

## 2025-06-25 DIAGNOSIS — F98.8 ADD (ATTENTION DEFICIT DISORDER) WITHOUT HYPERACTIVITY: ICD-10-CM

## 2025-06-26 RX ORDER — DEXTROAMPHETAMINE SACCHARATE, AMPHETAMINE ASPARTATE MONOHYDRATE, DEXTROAMPHETAMINE SULFATE AND AMPHETAMINE SULFATE 3.75; 3.75; 3.75; 3.75 MG/1; MG/1; MG/1; MG/1
15 CAPSULE, EXTENDED RELEASE ORAL DAILY
Qty: 30 CAPSULE | Refills: 0 | Status: SHIPPED | OUTPATIENT
Start: 2025-06-26

## 2025-06-26 NOTE — TELEPHONE ENCOUNTER
Controlled substance refill request notes    Refill request received for: Dextroamp-Amphet Er 15 Mg Cap  [place]  data reviewed 06/26/25:  Medication last refill: qty 30, 30 day supply, filled/sold to patient on 05/22/2025  Pended order: Dextroamp-Amphet Er 15 Mg Cap, qty 30, 30 day supply, no delay in fill date     Primary care provider: Odalis Pearson  Last office visit with this department: 10/28/2024  Last virtual visit with this department: 4/17/2024  Next appointment with PCP:     Date Visit Type Length Department Provider    7/1/2025  8:15 AM VIDEO VISIT RETURN 30 min [facility] Odalis Pearson MD   Location Instructions:    Due to [place] [address], travel times to this location may be longer than usual. Please plan for extra travel time and check the [place] Department of Transportation [address] [place] website for delay, closure, and detour information.  The [facility] ([facility]) is in a dense urban area with multiple transportation and parking options. You may wish to review options for  service and self-parking in more detail on the [facility] s website at [email or web address].       Refill request forwarded to provider for review.     Akiko HILL LPN  [facility]

## 2025-06-30 ASSESSMENT — PATIENT HEALTH QUESTIONNAIRE - PHQ9
SUM OF ALL RESPONSES TO PHQ QUESTIONS 1-9: 0
SUM OF ALL RESPONSES TO PHQ QUESTIONS 1-9: 0

## 2025-07-01 ENCOUNTER — VIRTUAL VISIT (OUTPATIENT)
Dept: INTERNAL MEDICINE | Facility: CLINIC | Age: 50
End: 2025-07-01
Payer: COMMERCIAL

## 2025-07-01 DIAGNOSIS — R41.840 ATTENTION OR CONCENTRATION DEFICIT: ICD-10-CM

## 2025-07-01 DIAGNOSIS — N95.1 MENOPAUSAL SYNDROME (HOT FLASHES): ICD-10-CM

## 2025-07-01 DIAGNOSIS — G43.109 MIGRAINE WITH AURA AND WITHOUT STATUS MIGRAINOSUS, NOT INTRACTABLE: Primary | ICD-10-CM

## 2025-07-01 PROCEDURE — 98007 SYNCH AUDIO-VIDEO EST HI 40: CPT | Performed by: INTERNAL MEDICINE

## 2025-07-01 PROCEDURE — 1126F AMNT PAIN NOTED NONE PRSNT: CPT | Performed by: INTERNAL MEDICINE

## 2025-07-01 RX ORDER — ESTRADIOL 0.05 MG/D
1 PATCH, EXTENDED RELEASE TRANSDERMAL
COMMUNITY
Start: 2025-05-12

## 2025-07-01 RX ORDER — RIZATRIPTAN BENZOATE 5 MG/1
5-10 TABLET, ORALLY DISINTEGRATING ORAL
Qty: 30 TABLET | Refills: 1 | Status: SHIPPED | OUTPATIENT
Start: 2025-07-01

## 2025-07-01 RX ORDER — PROGESTERONE 100 MG/1
100 CAPSULE ORAL DAILY
COMMUNITY

## 2025-07-01 RX ORDER — DEXTROAMPHETAMINE SACCHARATE, AMPHETAMINE ASPARTATE MONOHYDRATE, DEXTROAMPHETAMINE SULFATE AND AMPHETAMINE SULFATE 2.5; 2.5; 2.5; 2.5 MG/1; MG/1; MG/1; MG/1
10 CAPSULE, EXTENDED RELEASE ORAL DAILY
Qty: 30 CAPSULE | Refills: 0 | Status: SHIPPED | OUTPATIENT
Start: 2025-07-25

## 2025-07-01 NOTE — PATIENT INSTRUCTIONS
Thank you for visiting the Primary Care Center today at the [facility]! The following is some information about our clinic:     Primary Care Center Frequently-Asked Questions    (1) How do I schedule appointments at the [facility]?     Primary Care--to schedule or make changes to an existing appointment, please call our primary care line at [phone].    Labs--to schedule a lab appointment at the [facility] you can use [employer] or call [phone]. If you have a [place] location that is closer to home, you can reach out to that location for scheduling options.     Imaging--if you need to schedule a CT, X-ray, MRI, ultrasound, or other imaging study you can call [phone] to schedule at the [facility] or any other [facility] imaging location.     Referrals--if a referral to another specialty was ordered you can expect a phone call from their scheduling team. If you have not heard from them in a week, please call us or send us a [employer] message to check the status or get a scheduling number. Please note that this only applies to internal [facility] referrals. If the referral is external you would need to contact their office for scheduling.     (2) I have a question about my visit, who do I contact?     You can call us at the primary care line at [phone] to ask questions about your visit. You can also send a secure message through [employer], which is reviewed by clinic staff. Please note that [employer] messages have a twenty-four to forty-eight business hour turnaround time and should not be used for urgent concerns.    (3) How will I get the results of my tests?    If you are signed up for [employer] all tests will be released to you within twenty-four hours of resulting. Please allow three to five days for your doctor to review your results and place a note interpreting the results. If you do not have [employer] you will receive your  results through mail seven to ten business days following the return of the tests. Please note that if there should be any urgent or concerning results that your doctor or their registered nurse will reach out to you the same day as the tests come back. If you have follow up questions about your results or would like to discuss the results in detail please schedule a follow up with your provider either in person or virtually.     (4) How do I get refills of my prescriptions?     You should always first contact your pharmacy for refills of your medications. If submitting a refill request on [employer], please be sure to submit the request only once--repeat requests can cause delays in refill. If you are requesting a NEW medication or a medication related to new symptoms you will need to schedule an appointment with a provider prior to approval. Please note: Routine medication refills have up to one to three business day turnaround whereas controlled substances refills have up to five to seven business day turnaround.    (5) I have new symptoms, what do I do?     If you are having an immediate medical emergency, you should dial 911 for assistance.   For anything urgent that needs to be seen within a few hours to one day you should visit a local urgent care for assistance.  For non-urgent symptoms that need to be seen within a few days to a week you can schedule with an available provider in primary care by going to [employer] or calling [phone].   If you are not sure how serious your symptoms are or you would like to receive medical advice you can always call [phone] to speak with a triage nurse.

## 2025-07-01 NOTE — PROGRESS NOTES
"Joyce is a 49 year old who is being evaluated via a billable video visit.    How would you like to obtain your AVS? MyChart  If the video visit is dropped, the invitation should be resent by: Text to cell phone: [phone]  Will anyone else be joining your video visit? No  {If patient encounters technical issues they should call [phone] :650048}    Are refills needed on medications prescribed by this physician? NO    Joan Nelson VVF      {PROVIDER CHARTING PREFERENCE:741892}    Subjective   Joyce is a 49 year old, presenting for the following health issues:  RECHECK and HRT, headaches, med management  {(!) Visit Details have not yet been documented.  Please enter Visit Details and then use this list to pull in documentation. (Optional):885017}  History of Present Illness       Headaches:   Since the patient's last clinic visit, headaches are: worsened  The patient is getting headaches:  Several days each month  She is able to do normal daily activities when she has a migraine.  The patient is taking the following rescue/relief medications:  Ibuprofen (Advil, Motrin), Naproxyn (Aleve) and Tylenol   Patient states \"I get some relief\" from the rescue/relief medications.   The patient is taking the following medications to prevent migraines:  No medications to prevent migraines  In the past 4 weeks, the patient has gone to an Urgent Care or Emergency Room 0 times times due to headaches.    Reason for visit:  HRT, headaches, med management    She eats 2-3 servings of fruits and vegetables daily.She consumes 0 sweetened beverage(s) daily.She exercises with enough effort to increase her heart rate 10 to 19 minutes per day.  She exercises with enough effort to increase her heart rate 4 days per week.         {SUPERLIST (Optional):647332}  {additonal problems for provider to add (Optional):647457}    {ROS Picklists (Optional):513921}      Objective           Vitals:  No vitals were obtained today due to virtual " "visit.    Physical Exam   {video visit exam brief selected:097480}    {Diagnostic Test Results (Optional):435313}      Video-Visit Details    Type of service:  Video Visit   Originating Location (pt. Location): {video visit patient location:204232::\"Home\"}  {PROVIDER LOCATION On-site should be selected for visits conducted from your clinic location or adjoining [facility] hospital, academic office, or other nearby [facility] building. Off-site should be selected for all other provider locations, including home:177679}  Distant Location (provider location):  {virtual location provider:690014}  Platform used for Video Visit: {Virtual Visit Platforms:985828::\"Symtavision\"}  Signed Electronically by: Odalis Pearson MD  {Email feedback regarding this note to [email or web address]   :902220}  "

## 2025-07-08 ENCOUNTER — MYC MEDICAL ADVICE (OUTPATIENT)
Dept: INTERNAL MEDICINE | Facility: CLINIC | Age: 50
End: 2025-07-08
Payer: COMMERCIAL

## 2025-07-21 ENCOUNTER — MYC REFILL (OUTPATIENT)
Dept: INTERNAL MEDICINE | Facility: CLINIC | Age: 50
End: 2025-07-21
Payer: COMMERCIAL

## 2025-07-21 DIAGNOSIS — R41.840 ATTENTION OR CONCENTRATION DEFICIT: ICD-10-CM

## 2025-07-21 DIAGNOSIS — F98.8 ADD (ATTENTION DEFICIT DISORDER) WITHOUT HYPERACTIVITY: ICD-10-CM

## 2025-07-21 DIAGNOSIS — N95.1 MENOPAUSAL SYNDROME (HOT FLASHES): Primary | ICD-10-CM

## 2025-07-21 RX ORDER — ESTRADIOL 0.05 MG/D
1 PATCH, EXTENDED RELEASE TRANSDERMAL
Qty: 24 PATCH | Refills: 3 | Status: SHIPPED | OUTPATIENT
Start: 2025-07-21

## 2025-07-21 RX ORDER — DEXTROAMPHETAMINE SACCHARATE, AMPHETAMINE ASPARTATE MONOHYDRATE, DEXTROAMPHETAMINE SULFATE AND AMPHETAMINE SULFATE 3.75; 3.75; 3.75; 3.75 MG/1; MG/1; MG/1; MG/1
15 CAPSULE, EXTENDED RELEASE ORAL DAILY
Qty: 30 CAPSULE | Refills: 0 | Status: CANCELLED | OUTPATIENT
Start: 2025-07-21

## 2025-07-21 RX ORDER — DEXTROAMPHETAMINE SACCHARATE, AMPHETAMINE ASPARTATE MONOHYDRATE, DEXTROAMPHETAMINE SULFATE AND AMPHETAMINE SULFATE 2.5; 2.5; 2.5; 2.5 MG/1; MG/1; MG/1; MG/1
10 CAPSULE, EXTENDED RELEASE ORAL DAILY
Qty: 30 CAPSULE | Refills: 0 | Status: SHIPPED | OUTPATIENT
Start: 2025-07-25

## 2025-08-06 ENCOUNTER — MYC MEDICAL ADVICE (OUTPATIENT)
Dept: INTERNAL MEDICINE | Facility: CLINIC | Age: 50
End: 2025-08-06
Payer: COMMERCIAL

## 2025-08-06 DIAGNOSIS — N39.3 FEMALE STRESS INCONTINENCE: Primary | ICD-10-CM

## 2025-08-28 ENCOUNTER — MYC REFILL (OUTPATIENT)
Dept: INTERNAL MEDICINE | Facility: CLINIC | Age: 50
End: 2025-08-28
Payer: COMMERCIAL

## 2025-08-28 DIAGNOSIS — R41.840 ATTENTION OR CONCENTRATION DEFICIT: ICD-10-CM

## 2025-08-28 RX ORDER — DEXTROAMPHETAMINE SACCHARATE, AMPHETAMINE ASPARTATE MONOHYDRATE, DEXTROAMPHETAMINE SULFATE AND AMPHETAMINE SULFATE 2.5; 2.5; 2.5; 2.5 MG/1; MG/1; MG/1; MG/1
10 CAPSULE, EXTENDED RELEASE ORAL DAILY
Qty: 30 CAPSULE | Refills: 0 | Status: SHIPPED | OUTPATIENT
Start: 2025-08-28

## (undated) DEVICE — KIT ENDO TURNOVER/PROCEDURE W/CLEAN A SCOPE LINERS 103888

## (undated) RX ORDER — FENTANYL CITRATE 50 UG/ML
INJECTION, SOLUTION INTRAMUSCULAR; INTRAVENOUS
Status: DISPENSED
Start: 2017-12-19

## (undated) RX ORDER — FENTANYL CITRATE 0.05 MG/ML
INJECTION, SOLUTION INTRAMUSCULAR; INTRAVENOUS
Status: DISPENSED
Start: 2022-07-12